# Patient Record
Sex: FEMALE | Race: WHITE | NOT HISPANIC OR LATINO | Employment: OTHER | URBAN - METROPOLITAN AREA
[De-identification: names, ages, dates, MRNs, and addresses within clinical notes are randomized per-mention and may not be internally consistent; named-entity substitution may affect disease eponyms.]

---

## 2017-05-26 ENCOUNTER — RADIANT APPOINTMENT (OUTPATIENT)
Dept: GENERAL RADIOLOGY | Facility: CLINIC | Age: 62
End: 2017-05-26
Attending: PHYSICIAN ASSISTANT
Payer: COMMERCIAL

## 2017-05-26 ENCOUNTER — OFFICE VISIT (OUTPATIENT)
Dept: INTERNAL MEDICINE | Facility: CLINIC | Age: 62
End: 2017-05-26
Payer: COMMERCIAL

## 2017-05-26 VITALS
WEIGHT: 141.5 LBS | DIASTOLIC BLOOD PRESSURE: 74 MMHG | OXYGEN SATURATION: 97 % | SYSTOLIC BLOOD PRESSURE: 130 MMHG | TEMPERATURE: 98.3 F | HEART RATE: 99 BPM | HEIGHT: 68 IN | BODY MASS INDEX: 21.44 KG/M2

## 2017-05-26 DIAGNOSIS — M25.561 ACUTE PAIN OF RIGHT KNEE: ICD-10-CM

## 2017-05-26 DIAGNOSIS — M25.561 ACUTE PAIN OF RIGHT KNEE: Primary | ICD-10-CM

## 2017-05-26 PROCEDURE — 99214 OFFICE O/P EST MOD 30 MIN: CPT | Performed by: PHYSICIAN ASSISTANT

## 2017-05-26 PROCEDURE — 73560 X-RAY EXAM OF KNEE 1 OR 2: CPT | Mod: RT

## 2017-05-26 RX ORDER — METHYLPREDNISOLONE 4 MG
TABLET, DOSE PACK ORAL
Qty: 21 TABLET | Refills: 0 | Status: SHIPPED | OUTPATIENT
Start: 2017-05-26 | End: 2018-01-08

## 2017-05-26 NOTE — PATIENT INSTRUCTIONS
Icing,   No ibuprofen while on the steroid sybil. Ok for tylenol if needed.  Consider physical therapy    If not improving then would refer to ortho sports med.

## 2017-05-26 NOTE — NURSING NOTE
"Chief Complaint   Patient presents with     Musculoskeletal Problem     R knee pain-x2 wks        Initial /74 (BP Location: Left arm, Patient Position: Chair, Cuff Size: Adult Regular)  Pulse 99  Temp 98.3  F (36.8  C) (Oral)  Ht 5' 8\" (1.727 m)  Wt 141 lb 8 oz (64.2 kg)  SpO2 97%  BMI 21.52 kg/m2 Estimated body mass index is 21.52 kg/(m^2) as calculated from the following:    Height as of this encounter: 5' 8\" (1.727 m).    Weight as of this encounter: 141 lb 8 oz (64.2 kg).  Medication Reconciliation: complete    "

## 2017-05-26 NOTE — MR AVS SNAPSHOT
After Visit Summary   5/26/2017    Ireen Haile    MRN: [id number]           Patient Information     Date Of Birth          1955        Visit Information        Provider Department      5/26/2017 10:40 AM Starr Contreras PA-C [facility]        Today's Diagnoses     Acute pain of right knee    -  1      Care Instructions    Icing,   No ibuprofen while on the steroid sybil. Ok for tylenol if needed.  Consider physical therapy    If not improving then would refer to ortho sports med.          Follow-ups after your visit        Additional Services     [facility] PT, HAND, AND CHIROPRACTIC REFERRAL       **This order will print in the [facility] Scheduling Office**    Physical Therapy, Hand Therapy and Chiropractic Care are available through:    *[facility]  *[facility] [place]  *[facility]    Call one number to schedule at any of the above locations: [phone].    Your provider has referred you to: Physical Therapy at [facility] or [facility]    Indication/Reason for Referral: right knee pain   Onset of Illness: 2 weeks   Therapy Orders: Evaluate and Treat  Special Programs:   Special Request:     Didier Velásquez      Additional Comments for the Therapist or Chiropractor:     Please be aware that coverage of these services is subject to the terms and limitations of your health insurance plan.  Call member services at your health plan with any benefit or coverage questions.      Please bring the following to your appointment:    *Your personal calendar for scheduling future appointments  *Comfortable clothing                  Who to contact     If you have questions or need follow up information about today's clinic visit or your schedule please contact [facility] directly at [phone].  Normal or non-critical lab and imaging results will be communicated to you by MyChart, letter or phone within 4 business days  "after the clinic has received the results. If you do not hear from us within 7 days, please contact the clinic through [employer] or phone. If you have a critical or abnormal lab result, we will notify you by phone as soon as possible.  Submit refill requests through [employer] or call your pharmacy and they will forward the refill request to us. Please allow 3 business days for your refill to be completed.          Additional Information About Your Visit        [employer] Information     [employer] gives you secure access to your electronic health record. If you see a primary care provider, you can also send messages to your care team and make appointments. If you have questions, please call your primary care clinic.  If you do not have a primary care provider, please call [phone] and they will assist you.        Care EveryWhere ID     This is your Care EveryWhere ID. This could be used by other organizations to access your [place] medical records  [id number]        Your Vitals Were     Pulse Temperature Height Pulse Oximetry BMI (Body Mass Index)       99 98.3  F (36.8  C) (Oral) 5' 8\" (1.727 m) 97% 21.52 kg/m2        Blood Pressure from Last 3 Encounters:   05/26/17 130/74   09/23/16 122/74   08/12/16 124/64    Weight from Last 3 Encounters:   05/26/17 141 lb 8 oz (64.2 kg)   09/23/16 145 lb (65.8 kg)   08/12/16 147 lb 8 oz (66.9 kg)              We Performed the Following     [facility] PT, HAND, AND CHIROPRACTIC REFERRAL          Today's Medication Changes          These changes are accurate as of: 5/26/17 11:24 AM.  If you have any questions, ask your nurse or doctor.               Start taking these medicines.        Dose/Directions    methylPREDNISolone 4 MG tablet   Commonly known as:  MEDROL DOSEPAK   Used for:  Acute pain of right knee   Started by:  Starr Contreras PA-C        Follow package instructions   Quantity:  21 tablet   Refills:  0            Where to get your medicines      These medications were " sent to [id number] - [place] - [address] & [facility]  [address] [address]     Phone:  [phone]     methylPREDNISolone 4 MG tablet                Primary Care Provider Office Phone # Fax #    Leonardo Angela [phone]       [facility] [address]  [facility] [address]        Thank you!     Thank you for choosing [facility]  for your care. Our goal is always to provide you with excellent care. Hearing back from our patients is one way we can continue to improve our services. Please take a few minutes to complete the written survey that you may receive in the mail after your visit with us. Thank you!             Your Updated Medication List - Protect others around you: Learn how to safely use, store and throw away your medicines at www.disposemymeds.org.          This list is accurate as of: 5/26/17 11:24 AM.  Always use your most recent med list.                   Brand Name Dispense Instructions for use    methylPREDNISolone 4 MG tablet    MEDROL DOSEPAK    21 tablet    Follow package instructions       nicotine 21 MG/24HR 24 hr patch    NICODERM CQ    30 patch    Place 1 patch onto the skin every 24 hours

## 2017-05-26 NOTE — PROGRESS NOTES
"  SUBJECTIVE:                                                    Irene Haile is a 61 year old female who presents to clinic today for the following health issues:      Joint Pain     Onset: x2 wks     Description:   Location: right knee  Character: Sharp and Dull ache    Intensity: moderate    Progression of Symptoms: same    Accompanying Signs & Symptoms:  Other symptoms: swelling   History:   Previous similar pain: no       Precipitating factors:   Trauma or overuse: no     Alleviating factors:  Improved by: ice       Therapies Tried and outcome: ibuprofen and tylenol ? Redness of the knee on and off.         -------------------------------------    Problem list and histories reviewed & adjusted, as indicated.  Additional history: as documented    Labs reviewed in EPIC    Reviewed and updated as needed this visit by clinical staff  Tobacco  Allergies       Reviewed and updated as needed this visit by Provider         ROS:  Constitutional, derm , cardiovascular, pulmonary, gi  MS systems are negative, except as otherwise noted.    OBJECTIVE:                                                    /74 (BP Location: Left arm, Patient Position: Chair, Cuff Size: Adult Regular)  Pulse 99  Temp 98.3  F (36.8  C) (Oral)  Ht 5' 8\" (1.727 m)  Wt 141 lb 8 oz (64.2 kg)  SpO2 97%  BMI 21.52 kg/m2  Body mass index is 21.52 kg/(m^2).  GENERAL: healthy, alert and no distress  RESP: lungs clear to auscultation - no rales, rhonchi or wheezes  CV: regular rate and rhythm, normal S1 S2, no S3 or S4, no murmur, click or rub, no peripheral edema and peripheral pulses strong  MS: KNEE:  Inspection: small effusion  Tender: medial joint line  Non-tender: patella tendon, MCL, LCL, lateral joint line, prepatellar bursa, popliteal region  Active Range of Motion: pain with flexion, full extension  Strength:   Special tests: normal Valgus stress test, normal Varus, negative pivot shift    Also examined: hip full range of " motion  SKIN: no suspicious lesions or rashes    Diagnostic Test Results:  Xray prelim- no acute findings and joint space normal.      ASSESSMENT/PLAN:                                                            1. Acute pain of right knee    - XR Knee Standing Right 2 Views; Future  - ERVIN PT, HAND, AND CHIROPRACTIC REFERRAL  - methylPREDNISolone (MEDROL DOSEPAK) 4 MG tablet; Follow package instructions  Dispense: 21 tablet; Refill: 0    Reviewed treatment, declined referral to ortho sports med at this time.   If not improving though with above treatment plan, recommend to see ortho sports med.     Starr Contreras PA-C  [facility]

## 2017-06-10 ENCOUNTER — HEALTH MAINTENANCE LETTER (OUTPATIENT)
Age: 62
End: 2017-06-10

## 2017-12-30 DIAGNOSIS — Z13.6 CARDIOVASCULAR SCREENING; LDL GOAL LESS THAN 130: Primary | ICD-10-CM

## 2017-12-30 DIAGNOSIS — E03.9 HYPOTHYROIDISM, UNSPECIFIED TYPE: ICD-10-CM

## 2017-12-30 DIAGNOSIS — Z13.1 SCREENING FOR DIABETES MELLITUS: ICD-10-CM

## 2018-01-08 ENCOUNTER — OFFICE VISIT (OUTPATIENT)
Dept: FAMILY MEDICINE | Facility: CLINIC | Age: 63
End: 2018-01-08
Payer: COMMERCIAL

## 2018-01-08 VITALS
SYSTOLIC BLOOD PRESSURE: 118 MMHG | HEART RATE: 99 BPM | TEMPERATURE: 98.7 F | DIASTOLIC BLOOD PRESSURE: 70 MMHG | WEIGHT: 146 LBS | HEIGHT: 68 IN | BODY MASS INDEX: 22.13 KG/M2 | OXYGEN SATURATION: 97 % | RESPIRATION RATE: 20 BRPM

## 2018-01-08 DIAGNOSIS — J01.00 ACUTE NON-RECURRENT MAXILLARY SINUSITIS: Primary | ICD-10-CM

## 2018-01-08 DIAGNOSIS — Z12.31 VISIT FOR SCREENING MAMMOGRAM: ICD-10-CM

## 2018-01-08 PROCEDURE — 99213 OFFICE O/P EST LOW 20 MIN: CPT | Performed by: NURSE PRACTITIONER

## 2018-01-08 RX ORDER — FLUTICASONE PROPIONATE 50 MCG
2 SPRAY, SUSPENSION (ML) NASAL DAILY
Qty: 16 G | Refills: 0 | Status: SHIPPED | OUTPATIENT
Start: 2018-01-08 | End: 2018-03-08

## 2018-01-08 NOTE — PROGRESS NOTES
SUBJECTIVE:   Irene Haile is a 62 year old female who presents to clinic today for the following health issues:      RESPIRATORY SYMPTOMS      Duration: 2 weeks    Description  nasal congestion, rhinorrhea, facial pain/pressure, fever, chills, headache and fatigue/malaise    Severity: moderate    Accompanying signs and symptoms: Green nasal drainage    History (predisposing factors):  COPD    Precipitating or alleviating factors: None    Therapies tried and outcome:  oral decongestant acetaminophen with some help    Pt presents with 2 full weeks of symptoms.  No fevers.  + congestion, sinus pressure, post nasal drip.  Cough is present but does not feel it is of major significance.  She is taking in food and fluids.    Problem list and histories reviewed & adjusted, as indicated.  Additional history: as documented    Patient Active Problem List   Diagnosis     Tobacco use disorder     CARDIOVASCULAR SCREENING; LDL GOAL LESS THAN 130     ACP (advance care planning)     COPD (chronic obstructive pulmonary disease) (H)     Axillary adenopathy     Hypothyroidism, unspecified type     Past Surgical History:   Procedure Laterality Date     C NONSPECIFIC PROCEDURE   2010    Laparoscopic appendectomy     C TOTAL ABDOM HYSTERECTOMY       BSO       Social History   Substance Use Topics     Smoking status: Current Every Day Smoker     Packs/day: 0.50     Years: 20.00     Types: Cigarettes     Smokeless tobacco: Never Used      Comment:       Alcohol use No     Family History   Problem Relation Age of Onset     DIABETES Brother      Diag. age 41     Breast Cancer Mother      CANCER Mother      All over her body     Alcohol/Drug Father      Has Liver Problems     Cancer - colorectal Brother      before age 50     CANCER Brother      liver cancer      Family History Negative Brother      ulcerative colitis     Family History Negative Sister      Sjogren's Daughter              Reviewed and updated as  "[facility]  Meds  Med Hx  Surg Hx  Fam Hx  Soc Hx      Reviewed and updated as needed this visit by Provider         ROS:  SEE HPI.    OBJECTIVE:     /70  Pulse 99  Temp 98.7  F (37.1  C) (Oral)  Resp 20  Ht 5' 8\" (1.727 m)  Wt 146 lb (66.2 kg)  SpO2 97%  BMI 22.2 kg/m2  Body mass index is 22.2 kg/(m^2).  GENERAL: healthy, alert and no distress  EYES: Eyes grossly normal to inspection  HENT: ear canals and TM's normal, nose and mouth without ulcers or lesions.  Post nasal drip visualized.  NECK: no adenopathy, no asymmetry, masses, or scars and thyroid normal to palpation  RESP: lungs clear to auscultation - no rales, rhonchi or wheezes  CV: regular rate and rhythm, normal S1 S2, no S3 or S4, no murmur, click or rub, no peripheral edema and peripheral pulses strong  PSYCH: mentation appears normal, affect normal/bright    Diagnostic Test Results:  none     ASSESSMENT/PLAN:   1. Acute non-recurrent maxillary sinusitis  62 y.o. Female, here today with concerns regarding ongoing sinusitis symptoms x2 weeks.  No improvement.  Hx of COPD but does not seem to be causing symptoms now.  Augmentin, flonase.  Symptomatic care.  Pt agrees with plan and verbalized understanding.  - amoxicillin-clavulanate (AUGMENTIN) 875-125 MG per tablet; Take 1 tablet by mouth 2 times daily  Dispense: 20 tablet; Refill: 0  - fluticasone (FLONASE) 50 MCG/ACT spray; Spray 2 sprays into both nostrils daily  Dispense: 16 g; Refill: 0    2. Visit for screening mammogram  - *MA Screening Digital Bilateral; Future    Vale Vizcaino, ARUN CNP  [facility]  "

## 2018-01-08 NOTE — MR AVS SNAPSHOT
After Visit Summary   1/8/2018    Irene Haile    MRN: [id number]           Patient Information     Date Of Birth          1955        Visit Information        Provider Department      1/8/2018 10:40 AM Vale Vizcaino Ra, APRN CNP [facility]        Today's Diagnoses     Visit for screening mammogram    -  1    Acute non-recurrent maxillary sinusitis          Care Instructions    Please continue to monitor symptoms.  I would recommend increasing fluids, rest, and steamy humidification.  Please return to clinic if you notice any change or increase in symptoms.              Follow-ups after your visit        Future tests that were ordered for you today     Open Future Orders        Priority Expected Expires Ordered    *MA Screening Digital Bilateral Routine  1/8/2019 1/8/2018            Who to contact     If you have questions or need follow up information about today's clinic visit or your schedule please contact [facility] directly at [phone].  Normal or non-critical lab and imaging results will be communicated to you by MyChart, letter or phone within 4 business days after the clinic has received the results. If you do not hear from us within 7 days, please contact the clinic through [employer] or phone. If you have a critical or abnormal lab result, we will notify you by phone as soon as possible.  Submit refill requests through [employer] or call your pharmacy and they will forward the refill request to us. Please allow 3 business days for your refill to be completed.          Additional Information About Your Visit        MyChart Information     [employer] gives you secure access to your electronic health record. If you see a primary care provider, you can also send messages to your care team and make appointments. If you have questions, please call your primary care clinic.  If you do not have a primary care provider, please call [phone] and they will  "assist you.        Care EveryWhere ID     This is your Care EveryWhere ID. This could be used by other organizations to access your [place] medical records  [id number]        Your Vitals Were     Pulse Temperature Respirations Height Pulse Oximetry BMI (Body Mass Index)    99 98.7  F (37.1  C) (Oral) 20 5' 8\" (1.727 m) 97% 22.2 kg/m2       Blood Pressure from Last 3 Encounters:   01/08/18 118/70   05/26/17 130/74   09/23/16 122/74    Weight from Last 3 Encounters:   01/08/18 146 lb (66.2 kg)   05/26/17 141 lb 8 oz (64.2 kg)   09/23/16 145 lb (65.8 kg)                 Today's Medication Changes          These changes are accurate as of: 1/8/18 11:25 AM.  If you have any questions, ask your nurse or doctor.               Start taking these medicines.        Dose/Directions    amoxicillin-clavulanate 875-125 MG per tablet   Commonly known as:  AUGMENTIN   Used for:  Acute non-recurrent maxillary sinusitis   Started by:  Vale Vizcaino Ra, APRN CNP        Dose:  1 tablet   Take 1 tablet by mouth 2 times daily   Quantity:  20 tablet   Refills:  0       fluticasone 50 MCG/ACT spray   Commonly known as:  FLONASE   Used for:  Acute non-recurrent maxillary sinusitis   Started by:  Vale Vizcaino Ra, APRN CNP        Dose:  2 spray   Spray 2 sprays into both nostrils daily   Quantity:  16 g   Refills:  0            Where to get your medicines      These medications were sent to [facility] [address] [place] [address] [facility] AT [place] [address] & [facility]  [address] [facility] [address]     Phone:  [phone]     amoxicillin-clavulanate 875-125 MG per tablet    fluticasone 50 MCG/ACT spray                Primary Care Provider Office Phone # Fax #    Leonardo Angela [phone]       [address] [place] [address]        Equal Access to Services     TREY WYLIE AH: Mitesh Young, galileo collins, qaybta kaalmada adebasia arellano" medardo ruiz ah. So [facility] [phone].    ATENCIÓN: Si habla sharon, tiene a laura disposición servicios gratuitos de asistencia lingüística. Brunilda al [phone].    We comply with applicable federal civil rights laws and [place] laws. We do not discriminate on the basis of race, color, national origin, age, disability, sex, sexual orientation, or gender identity.            Thank you!     Thank you for choosing [facility]  for your care. Our goal is always to provide you with excellent care. Hearing back from our patients is one way we can continue to improve our services. Please take a few minutes to complete the written survey that you may receive in the mail after your visit with us. Thank you!             Your Updated Medication List - Protect others around you: Learn how to safely use, store and throw away your medicines at www.disposemymeds.org.          This list is accurate as of: 1/8/18 11:25 AM.  Always use your most recent med list.                   Brand Name Dispense Instructions for use Diagnosis    amoxicillin-clavulanate 875-125 MG per tablet    AUGMENTIN    20 tablet    Take 1 tablet by mouth 2 times daily    Acute non-recurrent maxillary sinusitis       fluticasone 50 MCG/ACT spray    FLONASE    16 g    Spray 2 sprays into both nostrils daily    Acute non-recurrent maxillary sinusitis

## 2018-01-08 NOTE — NURSING NOTE
"Chief Complaint   Patient presents with     URI       Initial /70  Pulse 99  Temp 98.7  F (37.1  C) (Oral)  Resp 20  Ht 5' 8\" (1.727 m)  Wt 146 lb (66.2 kg)  SpO2 97%  BMI 22.2 kg/m2 Estimated body mass index is 22.2 kg/(m^2) as calculated from the following:    Height as of this encounter: 5' 8\" (1.727 m).    Weight as of this encounter: 146 lb (66.2 kg).  Medication Reconciliation: complete   Lindsey HANNA M.A.      "

## 2018-01-08 NOTE — PATIENT INSTRUCTIONS
Please continue to monitor symptoms.  I would recommend increasing fluids, rest, and steamy humidification.  Please return to clinic if you notice any change or increase in symptoms.

## 2018-01-12 ENCOUNTER — TELEPHONE (OUTPATIENT)
Dept: FAMILY MEDICINE | Facility: CLINIC | Age: 63
End: 2018-01-12

## 2018-01-12 DIAGNOSIS — J01.90 ACUTE SINUSITIS WITH SYMPTOMS > 10 DAYS: Primary | ICD-10-CM

## 2018-01-12 RX ORDER — DOXYCYCLINE 100 MG/1
100 CAPSULE ORAL 2 TIMES DAILY
Qty: 20 CAPSULE | Refills: 0 | Status: SHIPPED | OUTPATIENT
Start: 2018-01-12 | End: 2018-01-22

## 2018-01-12 NOTE — TELEPHONE ENCOUNTER
"Patient c/o diarrhea \"big time\" and terrible stomach pain while taking augmentin. Had been taking probiotic also. Did not take med yesterday or today. Still continues with symptoms, cough a little worse.    Requesting alternative med.    Carolyn Greer RN    "

## 2018-02-02 ENCOUNTER — RADIANT APPOINTMENT (OUTPATIENT)
Dept: MAMMOGRAPHY | Facility: CLINIC | Age: 63
End: 2018-02-02
Attending: NURSE PRACTITIONER
Payer: COMMERCIAL

## 2018-02-02 DIAGNOSIS — Z12.31 VISIT FOR SCREENING MAMMOGRAM: ICD-10-CM

## 2018-02-02 DIAGNOSIS — E03.9 HYPOTHYROIDISM, UNSPECIFIED TYPE: ICD-10-CM

## 2018-02-02 DIAGNOSIS — Z13.1 SCREENING FOR DIABETES MELLITUS: ICD-10-CM

## 2018-02-02 DIAGNOSIS — Z13.6 CARDIOVASCULAR SCREENING; LDL GOAL LESS THAN 130: ICD-10-CM

## 2018-02-02 PROCEDURE — 77067 SCR MAMMO BI INCL CAD: CPT | Mod: TC

## 2018-02-02 PROCEDURE — 80061 LIPID PANEL: CPT | Performed by: INTERNAL MEDICINE

## 2018-02-02 PROCEDURE — 80048 BASIC METABOLIC PNL TOTAL CA: CPT | Performed by: INTERNAL MEDICINE

## 2018-02-02 PROCEDURE — 36415 COLL VENOUS BLD VENIPUNCTURE: CPT | Performed by: INTERNAL MEDICINE

## 2018-02-02 PROCEDURE — 84443 ASSAY THYROID STIM HORMONE: CPT | Performed by: INTERNAL MEDICINE

## 2018-02-02 PROCEDURE — 84439 ASSAY OF FREE THYROXINE: CPT | Performed by: INTERNAL MEDICINE

## 2018-02-03 LAB
ANION GAP SERPL CALCULATED.3IONS-SCNC: 8 MMOL/L (ref 3–14)
BUN SERPL-MCNC: 12 MG/DL (ref 7–30)
CALCIUM SERPL-MCNC: 9.2 MG/DL (ref 8.5–10.1)
CHLORIDE SERPL-SCNC: 106 MMOL/L (ref 94–109)
CHOLEST SERPL-MCNC: 170 MG/DL
CO2 SERPL-SCNC: 25 MMOL/L (ref 20–32)
CREAT SERPL-MCNC: 0.63 MG/DL (ref 0.52–1.04)
GFR SERPL CREATININE-BSD FRML MDRD: >90 ML/MIN/1.7M2
GLUCOSE SERPL-MCNC: 96 MG/DL (ref 70–99)
HDLC SERPL-MCNC: 39 MG/DL
LDLC SERPL CALC-MCNC: 112 MG/DL
NONHDLC SERPL-MCNC: 131 MG/DL
POTASSIUM SERPL-SCNC: 3.7 MMOL/L (ref 3.4–5.3)
SODIUM SERPL-SCNC: 139 MMOL/L (ref 133–144)
T4 FREE SERPL-MCNC: 0.88 NG/DL (ref 0.76–1.46)
TRIGL SERPL-MCNC: 94 MG/DL
TSH SERPL DL<=0.005 MIU/L-ACNC: 9.28 MU/L (ref 0.4–4)

## 2018-02-26 ENCOUNTER — TELEPHONE (OUTPATIENT)
Dept: INTERNAL MEDICINE | Facility: CLINIC | Age: 63
End: 2018-02-26

## 2018-02-26 NOTE — TELEPHONE ENCOUNTER
Patient was sent the following message in my chart a couple weeks ago but has not read the message regarding her lab results per EPIC chart:    [place],   Total Cholesterol, Triglycerides, Electrolyte, Glucose/Sugar and Kidney function lab results were normal.   HDL (good) Cholesterol, LDL (bad) Cholesterol and Non-HDL (bad) cholesterol lab results were abnormal.   Abnormalities of these lab values increase the risk for heart and other vascular disease.   Thyroid lab results were abnormal and shows that your thyroid function/metabolism is too low.  This can contribute to fatigue, weight gain, constipation, cold intolerance, etc.   Please make an appointment to see me in the next few weeks to discuss these results further and treatment options for your thyroid function.     Dr Angela       Please call patient and ask her to schedule a follow-up appointment with me regarding thyroid issues

## 2018-03-08 ENCOUNTER — OFFICE VISIT (OUTPATIENT)
Dept: INTERNAL MEDICINE | Facility: CLINIC | Age: 63
End: 2018-03-08
Payer: COMMERCIAL

## 2018-03-08 VITALS
TEMPERATURE: 97.8 F | WEIGHT: 146 LBS | HEART RATE: 80 BPM | RESPIRATION RATE: 16 BRPM | DIASTOLIC BLOOD PRESSURE: 72 MMHG | SYSTOLIC BLOOD PRESSURE: 116 MMHG | BODY MASS INDEX: 22.2 KG/M2

## 2018-03-08 DIAGNOSIS — Z23 NEED FOR TDAP VACCINATION: ICD-10-CM

## 2018-03-08 DIAGNOSIS — Z13.6 CARDIOVASCULAR SCREENING; LDL GOAL LESS THAN 130: Primary | ICD-10-CM

## 2018-03-08 DIAGNOSIS — E03.9 HYPOTHYROIDISM, UNSPECIFIED TYPE: ICD-10-CM

## 2018-03-08 DIAGNOSIS — F17.200 TOBACCO USE DISORDER: ICD-10-CM

## 2018-03-08 LAB
T3FREE SERPL-MCNC: 2.6 PG/ML (ref 2.3–4.2)
T4 FREE SERPL-MCNC: 0.81 NG/DL (ref 0.76–1.46)
TSH SERPL DL<=0.005 MIU/L-ACNC: 12.27 MU/L (ref 0.4–4)

## 2018-03-08 PROCEDURE — 90715 TDAP VACCINE 7 YRS/> IM: CPT | Performed by: INTERNAL MEDICINE

## 2018-03-08 PROCEDURE — 36415 COLL VENOUS BLD VENIPUNCTURE: CPT | Performed by: INTERNAL MEDICINE

## 2018-03-08 PROCEDURE — 86376 MICROSOMAL ANTIBODY EACH: CPT | Performed by: INTERNAL MEDICINE

## 2018-03-08 PROCEDURE — 84481 FREE ASSAY (FT-3): CPT | Performed by: INTERNAL MEDICINE

## 2018-03-08 PROCEDURE — 99214 OFFICE O/P EST MOD 30 MIN: CPT | Mod: 25 | Performed by: INTERNAL MEDICINE

## 2018-03-08 PROCEDURE — 84439 ASSAY OF FREE THYROXINE: CPT | Performed by: INTERNAL MEDICINE

## 2018-03-08 PROCEDURE — 90471 IMMUNIZATION ADMIN: CPT | Performed by: INTERNAL MEDICINE

## 2018-03-08 PROCEDURE — 84443 ASSAY THYROID STIM HORMONE: CPT | Performed by: INTERNAL MEDICINE

## 2018-03-08 PROCEDURE — 86800 THYROGLOBULIN ANTIBODY: CPT | Performed by: INTERNAL MEDICINE

## 2018-03-08 RX ORDER — LEVOTHYROXINE SODIUM 50 UG/1
50 TABLET ORAL DAILY
Qty: 30 TABLET | Refills: 11 | Status: SHIPPED | OUTPATIENT
Start: 2018-03-08 | End: 2018-04-18 | Stop reason: DRUGHIGH

## 2018-03-08 ASSESSMENT — PAIN SCALES - GENERAL: PAINLEVEL: NO PAIN (0)

## 2018-03-08 NOTE — NURSING NOTE
Screening Questionnaire for Adult Immunization    Are you sick today?   No   Do you have allergies to medications, food, a vaccine component or latex?   No   Have you ever had a serious reaction after receiving a vaccination?   No   Do you have a long-term health problem with heart disease, lung disease, asthma, kidney disease, metabolic disease (e.g. diabetes), anemia, or other blood disorder?   No   Do you have cancer, leukemia, HIV/AIDS, or any other immune system problem?   No   In the past 3 months, have you taken medications that affect  your immune system, such as prednisone, other steroids, or anticancer drugs; drugs for the treatment of rheumatoid arthritis, Crohn s disease, or psoriasis; or have you had radiation treatments?   No   Have you had a seizure, or a brain or other nervous system problem?   No   During the past year, have you received a transfusion of blood or blood     products, or been given immune (gamma) globulin or antiviral drug?   No   For women: Are you pregnant or is there a chance you could become        pregnant during the next month?   No   Have you received any vaccinations in the past 4 weeks?   No     Immunization questionnaire answers were all negative.        Per orders of , injection of Tdap given by Starr Rodriguez. Patient instructed to remain in clinic for 15 minutes afterwards, and to report any adverse reaction to me immediately.       Screening performed by Starr Rodriguez on 3/8/2018 at 3:39 PM.

## 2018-03-08 NOTE — PATIENT INSTRUCTIONS
Labs as ordered   Nicotine gum when want a cigarette  TDAP vaccine  If insurance doesn't cover nicotine gum, contact  [employer]   Start Levothyroxine 50 mcg tablet, 1 tablet daily in the morning for thyroid function  Repeat nonfasting thyroid labs in 6 weeks  Reduce saturated fats (red meats, fried and processed foods) in your diet, increase intake of foods rich in Omega-3 fatty acids (fish, nuts, seeds, etc) and increase the amount of color on your plate with fruits and vegetables.  Repeat fasting cholesterol/lipid lab in 1 year

## 2018-03-08 NOTE — MR AVS SNAPSHOT
After Visit Summary   3/8/2018    Irene Haile    MRN: [id number]           Patient Information     Date Of Birth          1955        Visit Information        Provider Department      3/8/2018 3:30 PM Leonardo Angela MD [facility]        Today's Diagnoses     Need for Tdap vaccination    -  1    Chronic obstructive pulmonary disease, unspecified COPD type (H)        Tobacco use disorder        Hypothyroidism, unspecified type          Care Instructions    Labs as ordered   Nicotine gum when want a cigarette  TDAP vaccine  If insurance doesn't cover nicotine gum, try  MNQUITPLAN          Follow-ups after your visit        Who to contact     If you have questions or need follow up information about today's clinic visit or your schedule please contact [facility] directly at [phone].  Normal or non-critical lab and imaging results will be communicated to you by MyChart, letter or phone within 4 business days after the clinic has received the results. If you do not hear from us within 7 days, please contact the clinic through MyChart or phone. If you have a critical or abnormal lab result, we will notify you by phone as soon as possible.  Submit refill requests through [employer] or call your pharmacy and they will forward the refill request to us. Please allow 3 business days for your refill to be completed.          Additional Information About Your Visit        MyChart Information     [employer] gives you secure access to your electronic health record. If you see a primary care provider, you can also send messages to your care team and make appointments. If you have questions, please call your primary care clinic.  If you do not have a primary care provider, please call [phone] and they will assist you.        Care EveryWhere ID     This is your Care EveryWhere ID. This could be used by other organizations to access your [facility]  records  [id number]        Your Vitals Were     Pulse Temperature Respirations BMI (Body Mass Index)          80 97.8  F (36.6  C) (Oral) 16 22.2 kg/m2         Blood Pressure from Last 3 Encounters:   03/08/18 116/72   01/08/18 118/70   05/26/17 130/74    Weight from Last 3 Encounters:   03/08/18 146 lb (66.2 kg)   01/08/18 146 lb (66.2 kg)   05/26/17 141 lb 8 oz (64.2 kg)              We Performed the Following     Anti thyroglobulin antibody     T3 Free     T4 free     TDAP VACCINE (ADACEL)     Thyroid peroxidase antibody     TSH     VACCINE ADMINISTRATION, INITIAL          Today's Medication Changes          These changes are accurate as of 3/8/18  4:15 PM.  If you have any questions, ask your nurse or doctor.               Start taking these medicines.        Dose/Directions    nicotine polacrilex 2 MG gum   Commonly known as:  EQ NICOTINE POLACRILEX   Used for:  Tobacco use disorder   Started by:  Leonardo Angela MD        Dose:  2 mg   Place 1 each (2 mg) inside cheek as needed for smoking cessation   Quantity:  240 tablet   Refills:  1            Where to get your medicines      These medications were sent to [facility] [address] [place], [place] [address] [facility] AT [facility] [address] [place] [id number] [facility] [address]     Phone:  [phone]     nicotine polacrilex 2 MG gum                Primary Care Provider Office Phone # Fax #    Leonardo Angela [phone]       [address] [place] [address]        Equal Access to Services     [facility]: Hadii aad ku hadasho Soomaali, waaxda luqadaha, qaybta kaalmada adeegyada, waxay idiin haytia ruiz . So [facility] [phone].    ATENCIÓN: Si habla español, tiene a laura disposición servicios gratuitos de asistencia lingüística. Llame al [phone].    We comply with applicable federal civil rights laws and [place] laws. We do not discriminate on the basis of race, color, national origin, age,  disability, sex, sexual orientation, or gender identity.            Thank you!     Thank you for choosing [facility]  for your care. Our goal is always to provide you with excellent care. Hearing back from our patients is one way we can continue to improve our services. Please take a few minutes to complete the written survey that you may receive in the mail after your visit with us. Thank you!             Your Updated Medication List - Protect others around you: Learn how to safely use, store and throw away your medicines at www.disposemymeds.org.          This list is accurate as of 3/8/18  4:15 PM.  Always use your most recent med list.                   Brand Name Dispense Instructions for use Diagnosis    nicotine polacrilex 2 MG gum    EQ NICOTINE POLACRILEX    240 tablet    Place 1 each (2 mg) inside cheek as needed for smoking cessation    Tobacco use disorder

## 2018-03-08 NOTE — LETTER
[facility]  [address] [address]  [phone]        March 13, 2019    Irene Haile  [address] [place] [address]              Dear Irene Haile    This is to remind you that your fasting labs is due.    You may call our office at [phone] to schedule an appointment.    Please disregard this notice if you have already had your labs drawn or made an appointment.        Sincerely,        Leonardo Angela MD

## 2018-03-08 NOTE — PROGRESS NOTES
SUBJECTIVE:   Irene Haile is a 62 year old female who presents to clinic today for the following health issues:    Chief Complaint   Patient presents with     Thyroid Cehck     Lab Results         Hypothyroidism Follow-up      Since last visit, patient describes the following symptoms: Weight stable, no hair loss, no skin changes, no constipation, no loose stools      Amount of exercise or physical activity: 2-3 days/week for an average of 30-45 minutes    Problems taking medications regularly: No    Medication side effects: none    Diet: regular (no restrictions)    Pt's past medical history, family history, habits, medications and allergies were reviewed with the patient today.  See snap shot for  HCM status. Most recent lab results reviewed with pt. Problem list and histories reviewed & adjusted, as indicated.  Additional history as below:     No identified additional risks  The 10-year ASCVD risk score (Stonevilleanival OBRIEN Jr, et al., 2013) is: 6.8%    Values used to calculate the score:      Age: 62 years      Sex: Female      Is Non- : No      Diabetic: No      Tobacco smoker: Yes      Systolic Blood Pressure: 116 mmHg      Is BP treated: No      HDL Cholesterol: 39 mg/dL      Total Cholesterol: 170 mg/dL    Component      Latest Ref Rng & Units 9/23/2016 2/2/2018 3/8/2018   Cholesterol      <200 mg/dL  170    Triglycerides      <150 mg/dL  94    HDL Cholesterol      >49 mg/dL  39 (L)    LDL Cholesterol Calculated      <100 mg/dL  112 (H)    Non HDL Cholesterol      <130 mg/dL  131 (H)    TSH      0.40 - 4.00 mU/L 10.88 (H) 9.28 (H) 12.27 (H)   T4 Free      0.76 - 1.46 ng/dL 0.87 0.88 0.81   Free T3      2.3 - 4.2 pg/mL   2.6     Patient currently smoking half pack of cigarettes per day.  Interested in trying some nicotine gum for cessation  Denies chest pain, shortness of breath, abdominal pain, headache, vision changes or side effects with medications.  Weight is up 5 pounds compared to  "last May 2017.  Mild fatigue and cold intolerance.  No constipation    Additional ROS:   Constitutional, HEENT, Cardiovascular, Pulmonary, GI and , Neuro, MSK and Psych review of systems/symptoms are otherwise negative or unchanged from previous, except as noted above.      OBJECTIVE:  /72  Pulse 80  Temp 97.8  F (36.6  C) (Oral)  Resp 16  Wt 146 lb (66.2 kg)  BMI 22.2 kg/m2   Estimated body mass index is 22.2 kg/(m^2) as calculated from the following:    Height as of 1/8/18: 5' 8\" (1.727 m).    Weight as of this encounter: 146 lb (66.2 kg).  Eye: PERRL, EOMI  HENT: ear canals and TM's normal and nose and mouth without ulcers or lesions   Neck: no adenopathy. Thyroid normal to palpation. No bruits  Pulm: Lungs clear to auscultation   CV: Regular rates and rhythm  GI: Soft, nontender, Normal active bowel sounds, No hepatosplenomegaly or masses palpable  Ext: Peripheral pulses intact. No edema.  Neuro: Normal strength and tone, sensory exam grossly normal    Assessment/Plan: (See plan discussion below for further details)  1. Hypothyroidism, unspecified type  Thyroid function remains too low.  Start levothyroxine.  Additional antibody labs as below . Recheck TSH 6 weeks   - T3 Free  - T4 free  - TSH  - Thyroid peroxidase antibody  - Anti thyroglobulin antibody  - TSH with free T4 reflex; Future  - levothyroxine (SYNTHROID/LEVOTHROID) 50 MCG tablet; Take 1 tablet (50 mcg) by mouth daily  Dispense: 30 tablet; Refill: 11    2. Need for Tdap vaccination  - TDAP VACCINE (ADACEL)  - VACCINE ADMINISTRATION, INITIAL    3. Tobacco use disorder  Counseled regarding smoking cessation.  Willing to consider nicotine gum. Rx done  - nicotine polacrilex (EQ NICOTINE POLACRILEX) 2 MG gum; Place 1 each (2 mg) inside cheek as needed for smoking cessation  Dispense: 240 tablet; Refill: 1    4. CARDIOVASCULAR SCREENING; LDL GOAL LESS THAN 130   CAD risk < 7.5%.  Will continue diet and exercise and repeat lab in 1 year. " Lipids improved  - Lipid panel reflex to direct LDL Fasting; Future    Plan discussion:  Labs as ordered   Nicotine gum when want a cigarette  TDAP vaccine  If insurance doesn't cover nicotine gum, contact  [facility]   Start Levothyroxine 50 mcg tablet, 1 tablet daily in the morning for thyroid function  Repeat nonfasting thyroid labs in 6 weeks  Reduce saturated fats (red meats, fried and processed foods) in your diet, increase intake of foods rich in Omega-3 fatty acids (fish, nuts, seeds, etc) and increase the amount of color on your plate with fruits and vegetables.  Repeat fasting cholesterol/lipid lab in 1 year          Leonardo Angela MD  Internal Medicine Department  [facility]

## 2018-03-09 ENCOUNTER — TELEPHONE (OUTPATIENT)
Dept: INTERNAL MEDICINE | Facility: CLINIC | Age: 63
End: 2018-03-09

## 2018-03-09 LAB
THYROGLOB AB SERPL IA-ACNC: <20 IU/ML (ref 0–40)
THYROPEROXIDASE AB SERPL-ACNC: 373 IU/ML

## 2018-03-09 NOTE — TELEPHONE ENCOUNTER
TSH is elevated, Rx for Levothyroxine has been sent to [facility] on [address]. Patient also needs fasting lab appointment in 6 weeks per Dr. Angela.  Detailed message left with above information, Patient has CTC on file.

## 2018-04-17 DIAGNOSIS — E03.9 HYPOTHYROIDISM, UNSPECIFIED TYPE: ICD-10-CM

## 2018-04-17 PROCEDURE — 84443 ASSAY THYROID STIM HORMONE: CPT | Performed by: INTERNAL MEDICINE

## 2018-04-17 PROCEDURE — 36415 COLL VENOUS BLD VENIPUNCTURE: CPT | Performed by: INTERNAL MEDICINE

## 2018-04-17 PROCEDURE — 84439 ASSAY OF FREE THYROXINE: CPT | Performed by: INTERNAL MEDICINE

## 2018-04-18 ENCOUNTER — MYC MEDICAL ADVICE (OUTPATIENT)
Dept: INTERNAL MEDICINE | Facility: CLINIC | Age: 63
End: 2018-04-18

## 2018-04-18 DIAGNOSIS — E03.9 HYPOTHYROIDISM, UNSPECIFIED TYPE: Primary | ICD-10-CM

## 2018-04-18 LAB
T4 FREE SERPL-MCNC: 0.89 NG/DL (ref 0.76–1.46)
TSH SERPL DL<=0.005 MIU/L-ACNC: 7.12 MU/L (ref 0.4–4)

## 2018-04-18 RX ORDER — LEVOTHYROXINE SODIUM 75 UG/1
75 TABLET ORAL DAILY
Qty: 30 TABLET | Refills: 11 | Status: SHIPPED | OUTPATIENT
Start: 2018-04-18 | End: 2019-05-14

## 2019-04-26 ENCOUNTER — ANCILLARY PROCEDURE (OUTPATIENT)
Dept: MAMMOGRAPHY | Facility: CLINIC | Age: 64
End: 2019-04-26
Payer: COMMERCIAL

## 2019-04-26 DIAGNOSIS — Z13.6 CARDIOVASCULAR SCREENING; LDL GOAL LESS THAN 130: ICD-10-CM

## 2019-04-26 DIAGNOSIS — E03.9 HYPOTHYROIDISM, UNSPECIFIED TYPE: ICD-10-CM

## 2019-04-26 DIAGNOSIS — Z12.31 VISIT FOR SCREENING MAMMOGRAM: ICD-10-CM

## 2019-04-26 LAB
CHOLEST SERPL-MCNC: 191 MG/DL
HDLC SERPL-MCNC: 39 MG/DL
LDLC SERPL CALC-MCNC: 129 MG/DL
NONHDLC SERPL-MCNC: 152 MG/DL
T4 FREE SERPL-MCNC: 1.25 NG/DL (ref 0.76–1.46)
TRIGL SERPL-MCNC: 117 MG/DL
TSH SERPL DL<=0.005 MIU/L-ACNC: 5.03 MU/L (ref 0.4–4)

## 2019-04-26 PROCEDURE — 36415 COLL VENOUS BLD VENIPUNCTURE: CPT | Performed by: INTERNAL MEDICINE

## 2019-04-26 PROCEDURE — 80061 LIPID PANEL: CPT | Performed by: INTERNAL MEDICINE

## 2019-04-26 PROCEDURE — 77067 SCR MAMMO BI INCL CAD: CPT | Mod: TC

## 2019-04-26 PROCEDURE — 84443 ASSAY THYROID STIM HORMONE: CPT | Performed by: INTERNAL MEDICINE

## 2019-04-26 PROCEDURE — 84439 ASSAY OF FREE THYROXINE: CPT | Performed by: INTERNAL MEDICINE

## 2019-05-14 DIAGNOSIS — E03.9 HYPOTHYROIDISM, UNSPECIFIED TYPE: ICD-10-CM

## 2019-05-14 RX ORDER — LEVOTHYROXINE SODIUM 75 UG/1
TABLET ORAL
Qty: 30 TABLET | Refills: 0 | Status: SHIPPED | OUTPATIENT
Start: 2019-05-14 | End: 2019-06-13

## 2019-05-14 NOTE — TELEPHONE ENCOUNTER
"Requested Prescriptions   Pending Prescriptions Disp Refills     levothyroxine (SYNTHROID/LEVOTHROID) 75 MCG tablet [Pharmacy Med Name: LEVOTHYROXINE 0.075MG (75MCG) TABS] 30 tablet 0     Sig: TAKE 1 TABLET BY MOUTH EVERY DAY       Thyroid Protocol Failed - 5/14/2019  3:18 AM        Failed - Recent (12 mo) or future (30 days) visit within the authorizing provider's specialty     Patient had office visit in the last 12 months or has a visit in the next 30 days with authorizing provider or within the authorizing provider's specialty.  See \"Patient Info\" tab in inbasket, or \"Choose Columns\" in Meds & Orders section of the refill encounter.              Failed - Normal TSH on file in past 12 months     Recent Labs   Lab Test 04/26/19  0912   TSH 5.03*              Passed - Patient is 12 years or older        Passed - Medication is active on med list        Passed - No active pregnancy on record     If patient is pregnant or has had a positive pregnancy test, please check TSH.          Passed - No positive pregnancy test in past 12 months     If patient is pregnant or has had a positive pregnancy test, please check TSH.          Last Written Prescription Date:  4/18/18  Last Fill Quantity: 30,  # refills: 11   Last office visit: 3/8/2018 with prescribing provider:  3/8/18   Future Office Visit:      "

## 2019-05-14 NOTE — TELEPHONE ENCOUNTER
Routing refill request to provider for review/approval because:  Labs out of range:  tsh  Patient needs to be seen because it has been more than 1 year since last office visit.

## 2019-06-13 DIAGNOSIS — E03.9 HYPOTHYROIDISM, UNSPECIFIED TYPE: ICD-10-CM

## 2019-06-13 NOTE — TELEPHONE ENCOUNTER
"Requested Prescriptions   Pending Prescriptions Disp Refills     levothyroxine (SYNTHROID/LEVOTHROID) 75 MCG tablet [Pharmacy Med Name: LEVOTHYROXINE 0.075MG (75MCG) TABS]  Last Written Prescription Date:  05/14/2019  Last Fill Quantity: 30,  # refills: 0   Last Office Visit: 3/8/2018   Future Office Visit:      30 tablet 0     Sig: TAKE 1 TABLET BY MOUTH EVERY DAY       Thyroid Protocol Failed - 6/13/2019  3:19 AM        Failed - Recent (12 mo) or future (30 days) visit within the authorizing provider's specialty     Patient had office visit in the last 12 months or has a visit in the next 30 days with authorizing provider or within the authorizing provider's specialty.  See \"Patient Info\" tab in inbasket, or \"Choose Columns\" in Meds & Orders section of the refill encounter.              Failed - Normal TSH on file in past 12 months     Recent Labs   Lab Test 04/26/19  0912   TSH 5.03*              Passed - Patient is 12 years or older        Passed - Medication is active on med list        Passed - No active pregnancy on record     If patient is pregnant or has had a positive pregnancy test, please check TSH.          Passed - No positive pregnancy test in past 12 months     If patient is pregnant or has had a positive pregnancy test, please check TSH.            "

## 2019-06-14 NOTE — TELEPHONE ENCOUNTER
Pt last seen in clinic March 2018. Was instructed 1 month ago that needed f/u appt and none made. Call pt and get appt scheduled in the next month and then route refill request back to me to address

## 2019-06-17 NOTE — TELEPHONE ENCOUNTER
Left detailed message. Consent to Communicate checked. Once pt calls to schedule a f/u with PCP refill will be ok'd. Please route to PCP when appointment is made. Lyric Nava on 6/17/2019 at 8:57 AM

## 2019-06-18 RX ORDER — LEVOTHYROXINE SODIUM 75 UG/1
TABLET ORAL
Qty: 30 TABLET | Refills: 0 | Status: SHIPPED | OUTPATIENT
Start: 2019-06-18 | End: 2019-06-24 | Stop reason: DRUGHIGH

## 2019-06-19 PROBLEM — E78.5 HYPERLIPIDEMIA LDL GOAL <100: Status: ACTIVE | Noted: 2019-06-19

## 2019-06-19 NOTE — TELEPHONE ENCOUNTER
Pt has an appt next week . Explained that if we were to test TSH she would not need to be fasting .Penelope Johnson RN     No

## 2019-06-19 NOTE — TELEPHONE ENCOUNTER
. Last TSH minimally elevated but normal T4. Unclear if has been consistent  With med  Use or not. Therefore will refill for 30 days and will discuss further with pt at appt later this month as scheduled. Inform pt

## 2019-06-24 ENCOUNTER — OFFICE VISIT (OUTPATIENT)
Dept: INTERNAL MEDICINE | Facility: CLINIC | Age: 64
End: 2019-06-24
Payer: COMMERCIAL

## 2019-06-24 VITALS
HEIGHT: 67 IN | WEIGHT: 146 LBS | OXYGEN SATURATION: 99 % | DIASTOLIC BLOOD PRESSURE: 76 MMHG | BODY MASS INDEX: 22.91 KG/M2 | RESPIRATION RATE: 16 BRPM | HEART RATE: 88 BPM | TEMPERATURE: 97.7 F | SYSTOLIC BLOOD PRESSURE: 126 MMHG

## 2019-06-24 DIAGNOSIS — E03.9 HYPOTHYROIDISM, UNSPECIFIED TYPE: Primary | ICD-10-CM

## 2019-06-24 DIAGNOSIS — F17.200 TOBACCO USE DISORDER: ICD-10-CM

## 2019-06-24 PROCEDURE — 99214 OFFICE O/P EST MOD 30 MIN: CPT | Performed by: INTERNAL MEDICINE

## 2019-06-24 RX ORDER — LEVOTHYROXINE SODIUM 88 UG/1
88 TABLET ORAL DAILY
Qty: 30 TABLET | Refills: 11 | Status: SHIPPED | OUTPATIENT
Start: 2019-06-24 | End: 2020-01-26 | Stop reason: DRUGHIGH

## 2019-06-24 ASSESSMENT — MIFFLIN-ST. JEOR: SCORE: 1254.88

## 2019-06-24 NOTE — PROGRESS NOTES
"Subjective     Irene Haile is a 63 year old female who presents to clinic today for the following health issues:    HPI   Hypothyroidism Follow-up      Since last visit, patient describes the following symptoms: Weight stable, no hair loss, no skin changes, no constipation, no loose stools      Amount of exercise or physical activity: 6-7 days/week for an average of greater than 60 minutes    Problems taking medications regularly: No    Medication side effects: none    Diet: regular (no restrictions)      Pt's past medical history, family history, habits, medications and allergies were reviewed with the patient today.  See snap shot for  HCM status. Most recent lab results reviewed with pt. Problem list and histories reviewed & adjusted, as indicated.  Additional history as below:    HAs been consistent with taking thyroid med daily. Energy OK. occ nap. Weght stable.  BMs daily. TSH elevated  Smoking 1/2 ppd.  Has failed nicotine gum and patches. Wants to try Chantix. Denies depression issues  Denies chest pain, shortness of breath, cough        Additional ROS:   Constitutional, HEENT, Cardiovascular, Pulmonary, GI and , Neuro, MSK and Psych review of systems/symptoms are otherwise negative or unchanged from previous, except as noted above.      OBJECTIVE:  /76   Pulse 88   Temp 97.7  F (36.5  C) (Oral)   Resp 16   Ht 1.702 m (5' 7\")   Wt 66.2 kg (146 lb)   SpO2 99%   BMI 22.87 kg/m     Estimated body mass index is 22.87 kg/m  as calculated from the following:    Height as of this encounter: 1.702 m (5' 7\").    Weight as of this encounter: 66.2 kg (146 lb).     HENT: No oral lesions noted  Neck: no adenopathy. Thyroid normal to palpation. No bruits  Pulm: Lungs clear to auscultation   CV: Regular rates and rhythm  GI: Soft, nontender, Normal active bowel sounds, No hepatosplenomegaly or masses palpable  Ext: Peripheral pulses intact. No edema.     Assessment/Plan: (See plan discussion below for " further details)   1. Hypothyroidism, unspecified type   Thyroid function too low and needs higher dose  - levothyroxine (SYNTHROID/LEVOTHROID) 88 MCG tablet; Take 1 tablet (88 mcg) by mouth daily  Dispense: 30 tablet; Refill: 11  - TSH with free T4 reflex; Future    2. Tobacco use disorder   Interested in quitting. No sx now  - varenicline (CHANTIX RICHARD) 0.5 MG X 11 & 1 MG X 42 tablet; Take 0.5 mg tab daily for 3 days, THEN 0.5 mg tab twice daily for 4 days, THEN 1 mg twice daily.  Dispense: 53 tablet; Refill: 0      Plan discussion:   Increase levothyroxine to 88mcg tab, 1 tab daily for thyroid function   Nonfasting TSH lab in mid August at  any [facility] lab  Chantix with dose tapering the first week to standard  twice  A day dose after the first week. Start in 2 weeks and  stop ALL smoking 1 week later  Email me in [place]  Or call nurseline [phone] 3 weeks after starting the Chantix. If doing OK, will then prescribe the standard dose for month 2 and 3         Leonardo Angela MD  Internal Medicine Department  [facility]    (Chart documentation was completed, in part, with [employer] voice-recognition software. Even though reviewed, some grammatical, spelling, and word errors may remain.)

## 2019-06-24 NOTE — PATIENT INSTRUCTIONS
Increase levothyroxine to 88mcg tab, 1 tab daily for thyroid function   Nonfasting TSH lab in mid August at  any [facility] lab  Chantix with dose tapering the first week to standard  twice  A day dose after the first week. Start in 2 weeks and  stop ALL smoking 1 week later  Email me in [place]  Or call nurseline [phone] 3 weeks after starting the Chantix. If doing OK, will then prescribe the standard dose for month 2 and 3

## 2019-08-14 DIAGNOSIS — F17.200 TOBACCO USE DISORDER: Primary | ICD-10-CM

## 2019-08-14 NOTE — TELEPHONE ENCOUNTER
Confirm with pt that she is not currently smoking and then route back to me with update. If off cigs, will address refills. If still smoking, however, then makes no sense to continue Chantix if has continued to smoke and will have to look into alternatives

## 2019-08-16 RX ORDER — VARENICLINE TARTRATE 1 MG/1
1 TABLET, FILM COATED ORAL 2 TIMES DAILY
Qty: 60 TABLET | Refills: 1 | Status: SHIPPED | OUTPATIENT
Start: 2019-08-16 | End: 2020-01-10

## 2019-08-16 NOTE — TELEPHONE ENCOUNTER
Called patient and asked providers question and she stated that she has been out of meds for 1 week now and she has been chewing gum like crazy. She has had about 2 cigarettes but that is it. She would really like to get back on the chantix.

## 2019-09-30 ENCOUNTER — HEALTH MAINTENANCE LETTER (OUTPATIENT)
Age: 64
End: 2019-09-30

## 2020-01-10 ENCOUNTER — ANCILLARY PROCEDURE (OUTPATIENT)
Dept: GENERAL RADIOLOGY | Facility: CLINIC | Age: 65
End: 2020-01-10
Attending: INTERNAL MEDICINE
Payer: COMMERCIAL

## 2020-01-10 ENCOUNTER — OFFICE VISIT (OUTPATIENT)
Dept: INTERNAL MEDICINE | Facility: CLINIC | Age: 65
End: 2020-01-10
Payer: COMMERCIAL

## 2020-01-10 VITALS
OXYGEN SATURATION: 98 % | RESPIRATION RATE: 16 BRPM | WEIGHT: 143 LBS | HEART RATE: 92 BPM | DIASTOLIC BLOOD PRESSURE: 76 MMHG | BODY MASS INDEX: 22.44 KG/M2 | HEIGHT: 67 IN | TEMPERATURE: 98.9 F | SYSTOLIC BLOOD PRESSURE: 130 MMHG

## 2020-01-10 DIAGNOSIS — J06.9 UPPER RESPIRATORY TRACT INFECTION, UNSPECIFIED TYPE: ICD-10-CM

## 2020-01-10 DIAGNOSIS — F17.200 TOBACCO USE DISORDER: ICD-10-CM

## 2020-01-10 DIAGNOSIS — E03.9 HYPOTHYROIDISM, UNSPECIFIED TYPE: ICD-10-CM

## 2020-01-10 DIAGNOSIS — J06.9 UPPER RESPIRATORY TRACT INFECTION, UNSPECIFIED TYPE: Primary | ICD-10-CM

## 2020-01-10 LAB
T4 FREE SERPL-MCNC: 1.12 NG/DL (ref 0.76–1.46)
TSH SERPL DL<=0.005 MIU/L-ACNC: 5.12 MU/L (ref 0.4–4)

## 2020-01-10 PROCEDURE — 36415 COLL VENOUS BLD VENIPUNCTURE: CPT | Performed by: INTERNAL MEDICINE

## 2020-01-10 PROCEDURE — 71046 X-RAY EXAM CHEST 2 VIEWS: CPT

## 2020-01-10 PROCEDURE — 99214 OFFICE O/P EST MOD 30 MIN: CPT | Performed by: INTERNAL MEDICINE

## 2020-01-10 PROCEDURE — 84439 ASSAY OF FREE THYROXINE: CPT | Performed by: INTERNAL MEDICINE

## 2020-01-10 PROCEDURE — 84443 ASSAY THYROID STIM HORMONE: CPT | Performed by: INTERNAL MEDICINE

## 2020-01-10 RX ORDER — VARENICLINE TARTRATE 1 MG/1
1 TABLET, FILM COATED ORAL 2 TIMES DAILY
Qty: 60 TABLET | Refills: 1 | Status: SHIPPED | OUTPATIENT
Start: 2020-01-10 | End: 2021-05-12

## 2020-01-10 RX ORDER — BENZONATATE 200 MG/1
200 CAPSULE ORAL 3 TIMES DAILY PRN
Qty: 30 CAPSULE | Refills: 1 | Status: SHIPPED | OUTPATIENT
Start: 2020-01-10 | End: 2020-02-13

## 2020-01-10 ASSESSMENT — MIFFLIN-ST. JEOR: SCORE: 1236.27

## 2020-01-10 NOTE — PATIENT INSTRUCTIONS
Chest Xray   Labs as ordered   Allegra/fexofenadine 180mg tab, 1 tab daily to help dry up post nasal drainage. May get over the counter   When upper respiratory issues doing better, then start Chantix as directed for smoking cessation and try to stop ALL use of cigarettes 1 week after starting the medication or earlier if able  Advair 1 inhalation twice a day  To help breathing and reduce chest inflammation/cough. Use until cough resolves and breathing easier. Gargle and rinse mouth out with water after use  Benzonatate 1 tab every 8 hrs as needed for cough suppression

## 2020-01-10 NOTE — PROGRESS NOTES
"Subjective     Irene Haile is a 64 year old female who presents to clinic today for the following health issues:    HPI   RESPIRATORY SYMPTOMS      Duration: 1 month    Description  nasal congestion and cough    Severity: moderate    Accompanying signs and symptoms: no appetite, low grade fever     History (predisposing factors):  none    Precipitating or alleviating factors: None    Therapies tried and outcome:  Tylenol, Mucinex- No Relief    Pt's past medical history, family history, habits, medications and allergies were reviewed with the patient today.  See snap shot for  HCM status. Most recent lab results reviewed with pt. Problem list and histories reviewed & adjusted, as indicated.  Additional history as below:    Smoking 1/2 ppd  Hx hypothyroidism. TSH mildly elevated April 2019 when missing doses at  times. Never  had lab f/u as ordered. Compliant with med use now per pt  Treated with  Zpack  On 12/30/19 through  Urgent care. No CXR done. Sx better some re:  Discoloration of nasal discharge that as green and now clear  No F/C now.  No ear  pain. Has stuffiness bilaterally. Mild sore throat.  Mild pressure headache  Cough productive but clear now. Worse at night.  Has a lot of nasal drainage   Still smoking. Down for  4/day     Additional ROS:   Constitutional, HEENT, Cardiovascular, Pulmonary, GI and , Neuro, MSK and Psych review of systems/symptoms are otherwise negative or unchanged from previous, except as noted above.      OBJECTIVE:  /76   Pulse 92   Temp 98.9  F (37.2  C) (Temporal)   Resp 16   Ht 1.702 m (5' 7\")   Wt 64.9 kg (143 lb)   SpO2 98%   BMI 22.40 kg/m     Estimated body mass index is 22.4 kg/m  as calculated from the following:    Height as of this encounter: 1.702 m (5' 7\").    Weight as of this encounter: 64.9 kg (143 lb).     HENT: ear canals and TM's normal and nose and mouth without ulcers or lesions, Nasal mucosa edematous with clear nasal discharge. Sinuses " NT  Neck: no adenopathy. Thyroid normal to palpation. No bruits  Pulm: Lungs clear to auscultation posterior with slight prolonged exp phase. Mld upper ant midline rhonchi that clear with cough. NO accessory muscle use  CV: Regular rates and rhythm  GI: Soft, nontender, Normal active bowel sounds, No hepatosplenomegaly or masses palpable  Ext: Peripheral pulses intact. No edema.       CHEST TWO VIEWS  1/10/2020 9:41 AM      HISTORY: 64-year-old woman with cough for 2 months. History of  smoking.                                                                       IMPRESSION: Since September 23, 2016, heart size is normal. No pleural  effusion, pneumothorax, or abnormal area of consolidation. Irregular  pleural thickening in the right apex is unchanged.     ELIANA ESPARZA MD      Assessment/Plan: (See plan discussion below for further details)  1. Upper respiratory tract infection, unspecified type   Pt s/p Z pack. Improving re: sputum discoloration but still with reap sx. See plan discussion below.   - XR Chest 2 Views; Future  - fluticasone-salmeterol (ADVAIR DISKUS) 250-50 MCG/DOSE inhaler; Inhale 1 puff into the lungs every 12 hours  Dispense: 1 Inhaler; Refill: 0  - benzonatate (TESSALON) 200 MG capsule; Take 1 capsule (200 mg) by mouth 3 times daily as needed for cough  Dispense: 30 capsule; Refill: 1  - T4 free  - T4 free    2. Hypothyroidism, unspecified type   Due for lab f/u.  Previous elevated TSH but had poor compliance with last lab. Taking daily now and energy OK  Prior to recent URI onset  - TSH with free T4 reflex    3. Tobacco use disorder   Motivated to quit smoking.  is ex-smoker. Will restart Chantix  - varenicline (CHANTIX RICHARD) 0.5 MG X 11 & 1 MG X 42 tablet; Take 0.5 mg tab daily for 3 days, THEN 0.5 mg tab twice daily for 4 days, THEN 1 mg twice daily.  Dispense: 53 tablet; Refill: 0  - varenicline (CHANTIX) 1 MG tablet; Take 1 tablet (1 mg) by mouth 2 times daily  Dispense: 60 tablet;  Refill: 1    Plan discussion:   Chest Xray   Labs as ordered   Allegra/fexofenadine 180mg tab, 1 tab daily as needed to help dry up post nasal drainage. May get over the counter   When upper respiratory issues doing better, then start Chantix as directed for smoking cessation and try to stop ALL use of cigarettes 1 week after starting the medication or earlier if able  Advair 1 inhalation twice a day  To help breathing and reduce chest inflammation/cough. Use until cough resolves and breathing easier. Gargle and rinse mouth out with water after use  Benzonatate 1 tab every 8 hrs as needed for cough suppression       Leonardo Angela MD  Internal Medicine Department  [facility]    (Chart documentation was completed, in part, with [employer] voice-recognition software. Even though reviewed, some grammatical, spelling, and word errors may remain.)

## 2020-01-26 DIAGNOSIS — E03.9 HYPOTHYROIDISM, UNSPECIFIED TYPE: Primary | ICD-10-CM

## 2020-01-26 RX ORDER — LEVOTHYROXINE SODIUM 100 UG/1
100 TABLET ORAL DAILY
Qty: 90 TABLET | Refills: 3 | Status: SHIPPED | OUTPATIENT
Start: 2020-01-26 | End: 2020-03-18 | Stop reason: DRUGHIGH

## 2020-02-13 ENCOUNTER — OFFICE VISIT (OUTPATIENT)
Dept: FAMILY MEDICINE | Facility: CLINIC | Age: 65
End: 2020-02-13
Payer: COMMERCIAL

## 2020-02-13 VITALS
SYSTOLIC BLOOD PRESSURE: 118 MMHG | HEART RATE: 108 BPM | HEIGHT: 69 IN | BODY MASS INDEX: 20.97 KG/M2 | DIASTOLIC BLOOD PRESSURE: 58 MMHG | WEIGHT: 141.6 LBS | TEMPERATURE: 98.4 F | RESPIRATION RATE: 18 BRPM | OXYGEN SATURATION: 94 %

## 2020-02-13 DIAGNOSIS — J44.1 COPD EXACERBATION (H): Primary | ICD-10-CM

## 2020-02-13 PROCEDURE — 99213 OFFICE O/P EST LOW 20 MIN: CPT | Performed by: PHYSICIAN ASSISTANT

## 2020-02-13 RX ORDER — PREDNISONE 20 MG/1
TABLET ORAL
Qty: 20 TABLET | Refills: 0 | Status: SHIPPED | OUTPATIENT
Start: 2020-02-13 | End: 2020-03-18

## 2020-02-13 RX ORDER — ALBUTEROL SULFATE 90 UG/1
1-2 AEROSOL, METERED RESPIRATORY (INHALATION) EVERY 4 HOURS PRN
Qty: 1 INHALER | Refills: 0 | Status: SHIPPED | OUTPATIENT
Start: 2020-02-13 | End: 2021-05-12

## 2020-02-13 RX ORDER — HYDROCODONE BITARTRATE AND ACETAMINOPHEN 5; 325 MG/1; MG/1
1 TABLET ORAL
Qty: 7 TABLET | Refills: 0 | Status: SHIPPED | OUTPATIENT
Start: 2020-02-13 | End: 2020-06-22

## 2020-02-13 RX ORDER — AZITHROMYCIN 250 MG/1
TABLET, FILM COATED ORAL
Qty: 6 TABLET | Refills: 0 | Status: SHIPPED | OUTPATIENT
Start: 2020-02-13 | End: 2020-03-18

## 2020-02-13 RX ORDER — BENZONATATE 200 MG/1
200 CAPSULE ORAL 3 TIMES DAILY PRN
Qty: 30 CAPSULE | Refills: 1 | Status: SHIPPED | OUTPATIENT
Start: 2020-02-13 | End: 2020-03-18

## 2020-02-13 ASSESSMENT — MIFFLIN-ST. JEOR: SCORE: 1253.73

## 2020-02-13 NOTE — PROGRESS NOTES
Subjective     Irene Haile is a 64 year old female who presents to clinic today for the following health issues:    URI        Acute Illness   Acute illness concerns: cough, cold  Onset: 2 days, but states she has been sick for several months.  Treated a month ago and symptoms seemed to improve with meds, then returned.     Fever: YES--subjective    Chills/Sweats: YES    Headache (location?): YES    Sinus Pressure: No    Conjunctivitis:  No    Ear Pain: YES: bilateral    Rhinorrhea: YES    Congestion: YES    Sore Throat: no      Cough: YES-productive of clear sputum, productive of yellow sputum    Wheeze: no     Decreased Appetite: YES    Nausea: no     Vomiting: no     Diarrhea:  YES a few times    Dysuria/Freq.: no     Fatigue/Achiness: YES    Sick/Strep Exposure:No     Therapies Tried and outcome: Meds from family doctor, using advair as needed         Patient Active Problem List   Diagnosis     Tobacco use disorder     ACP (advance care planning)     COPD (chronic obstructive pulmonary disease) (H)     Hypothyroidism, unspecified type     Hyperlipidemia LDL goal <100     Past Surgical History:   Procedure Laterality Date     C NONSPECIFIC PROCEDURE   2010    Laparoscopic appendectomy     C TOTAL ABDOM HYSTERECTOMY       BSO       Social History     Tobacco Use     Smoking status: Current Every Day Smoker     Packs/day: 0.50     Years: 20.00     Pack years: 10.00     Types: Cigarettes     Smokeless tobacco: Never Used     Tobacco comment:     Substance Use Topics     Alcohol use: No     Family History   Problem Relation Age of Onset     Diabetes Brother         Diag. age 41     Breast Cancer Mother      Cancer Mother         All over her body     Alcohol/Drug Father         Has Liver Problems     Cancer - colorectal Brother         before age 50     Cancer Brother         liver cancer      Family History Negative Brother         ulcerative colitis     Family History Negative Sister       "Sjogren's Daughter            Reviewed and updated as needed this visit by Provider         Review of Systems   ROS COMP: Constitutional, HEENT, cardiovascular, pulmonary, gi and gu systems are negative, except as otherwise noted.      Objective    /58 (BP Location: Right arm, Patient Position: Chair, Cuff Size: Adult Regular)   Pulse 108   Temp 98.4  F (36.9  C) (Oral)   Resp 18   Ht 1.74 m (5' 8.5\")   Wt 64.2 kg (141 lb 9.6 oz)   SpO2 97%   BMI 21.22 kg/m    There is no height or weight on file to calculate BMI.  Physical Exam   GENERAL APPEARANCE: healthy, alert and no distress  HENT: ear canals and TM's normal, rhinorrhea clear, oral mucous membranes moist and oropharynx clear  RESP: expiratory wheezes throughout and prolonged expiratory phase  CV: regular rates and rhythm, normal S1 S2, no S3 or S4 and no murmur, click or rub            Assessment & Plan     1. COPD exacerbation (H)  Few days of hydrocodone for bedtime for cough, pt not sleeping well due to cough.  States codeine causes insomnia.   Continue advair  Close follow-up with PCP for further management in future.   - azithromycin (ZITHROMAX) 250 MG tablet; Two tablets first day, then one tablet daily for four days.  Dispense: 6 tablet; Refill: 0  - albuterol (PROAIR HFA/PROVENTIL HFA/VENTOLIN HFA) 108 (90 Base) MCG/ACT inhaler; Inhale 1-2 puffs into the lungs every 4 hours as needed  Dispense: 1 Inhaler; Refill: 0  - predniSONE (DELTASONE) 20 MG tablet; Take 3 tabs by mouth daily x 3 days, then 2 tabs daily x 3 days, then 1 tab daily x 3 days, then 1/2 tab daily x 3 days.  Dispense: 20 tablet; Refill: 0  - HYDROcodone-acetaminophen (NORCO) 5-325 MG tablet; Take 1 tablet by mouth nightly as needed (cough)  Dispense: 7 tablet; Refill: 0     Tobacco Cessation:   reports that she has been smoking cigarettes. She has a 10.00 pack-year smoking history. She has never used smokeless tobacco.              No follow-ups on file.    Marissa HOWARD" ANEL Altamirano  [facility]

## 2020-02-14 ENCOUNTER — TELEPHONE (OUTPATIENT)
Dept: INTERNAL MEDICINE | Facility: CLINIC | Age: 65
End: 2020-02-14

## 2020-02-14 NOTE — TELEPHONE ENCOUNTER
Reviewed note in chart from [facility] clinic. Given recurrent respiratory issues with breathing, prednisone appears  appropriate. Best thing pt can do is to quit ALL smoking. Was given Chantix when I last saw her 1 month ago but recent  Note from -AV says pt still smoking

## 2020-02-14 NOTE — TELEPHONE ENCOUNTER
Patient was seen by another provider 2/13/2020 and was prescribed   predniSONE (DELTASONE) 20 MG tablet 20 tablet     She is not sure if she should be taking this medication. She would like to speak to a nurse.

## 2020-02-14 NOTE — TELEPHONE ENCOUNTER
Irene Orantes would like to have your opinion on the prednisone that was Rx'd yesterday at [facility]. Her symptoms/wheezing were the same as when she saw you on 1/10/20 and at that time you had decided to not Rx prednisone.  Could you let her know if you would like her to take the prednisone?  She is leaning towards not taking it as the inhalers are working well.     See other meds that were also Rx'd yesterday.    She is not going to take the norco that was Rx'd as she is sleeping well.    Zully Denson RN- Triage [employer]

## 2020-03-15 ENCOUNTER — HEALTH MAINTENANCE LETTER (OUTPATIENT)
Age: 65
End: 2020-03-15

## 2020-03-17 DIAGNOSIS — E03.9 HYPOTHYROIDISM, UNSPECIFIED TYPE: ICD-10-CM

## 2020-03-17 LAB
T4 FREE SERPL-MCNC: 1.22 NG/DL (ref 0.76–1.46)
TSH SERPL DL<=0.005 MIU/L-ACNC: 4.77 MU/L (ref 0.4–4)

## 2020-03-17 PROCEDURE — 36415 COLL VENOUS BLD VENIPUNCTURE: CPT | Performed by: INTERNAL MEDICINE

## 2020-03-17 PROCEDURE — 84443 ASSAY THYROID STIM HORMONE: CPT | Performed by: INTERNAL MEDICINE

## 2020-03-17 PROCEDURE — 84439 ASSAY OF FREE THYROXINE: CPT | Performed by: INTERNAL MEDICINE

## 2020-03-18 DIAGNOSIS — E03.9 HYPOTHYROIDISM, UNSPECIFIED TYPE: Primary | ICD-10-CM

## 2020-03-18 RX ORDER — LEVOTHYROXINE SODIUM 112 UG/1
112 TABLET ORAL DAILY
Qty: 90 TABLET | Refills: 3 | Status: SHIPPED | OUTPATIENT
Start: 2020-03-18 | End: 2020-05-20 | Stop reason: DRUGHIGH

## 2020-03-20 ENCOUNTER — MYC MEDICAL ADVICE (OUTPATIENT)
Dept: INTERNAL MEDICINE | Facility: CLINIC | Age: 65
End: 2020-03-20

## 2020-03-20 NOTE — TELEPHONE ENCOUNTER
Called and spoke to patient.    Informed patient we levothyroxine dosage was increased from 100mcg to 112mcg- because her thyroid lab results were abnormal and showed thyroid function is too low w/ the current dose.    Advised patient to stop taking the current dose and  the new dose of 112mcg tablet- take 1 tablet daily, order includes 3 refills.    Advised patient to call back if she experiences any concerning s/e.    Patient voiced understanding.    LUCA Washington-BSN-PHN  [facility]  [phone]

## 2020-03-20 NOTE — TELEPHONE ENCOUNTER
PCP please see MyChart response regarding Thyroid lab results and questions about increase dose.    Kristyn ALEXANDREN, RN, PHN

## 2020-05-11 DIAGNOSIS — Z13.1 SCREENING FOR DIABETES MELLITUS: ICD-10-CM

## 2020-05-11 DIAGNOSIS — E03.9 HYPOTHYROIDISM, UNSPECIFIED TYPE: ICD-10-CM

## 2020-05-11 DIAGNOSIS — Z13.6 CARDIOVASCULAR SCREENING; LDL GOAL LESS THAN 100: Primary | ICD-10-CM

## 2020-05-11 NOTE — TELEPHONE ENCOUNTER
Routing refill request to provider for review/approval because:  Labs not current:  Normal TSH on file in past 12 months

## 2020-05-11 NOTE — TELEPHONE ENCOUNTER
Pt overdue for f/u   Thyroid and other labs with last dose increase of Levothyroxine in March. Schedule pt for a fasting  lab test with curide lab this week. Will address RF request after labs back as appropriate. Inform pt and assist with scheduling lab. Then route RF request back to me to hold on to until labs back

## 2020-05-12 RX ORDER — LEVOTHYROXINE SODIUM 100 UG/1
TABLET ORAL
Qty: 90 TABLET | Refills: 3 | OUTPATIENT
Start: 2020-05-12

## 2020-05-12 NOTE — TELEPHONE ENCOUNTER
Left detailed message for pt to call back to make a fasting lab only appt either inside lab or curbside lab and then once results are back the medication will be filled with appropriate dosage.

## 2020-05-12 NOTE — TELEPHONE ENCOUNTER
Will await call back from pt. Also, RF request for the 100mcg tab with this encounter rejected since pt is now supposed to be taking 112mcg tabs prescribed in March. Also call pt and confirm that pt is taking 112mcg tabs now for Levothyroxine

## 2020-05-14 DIAGNOSIS — E03.9 HYPOTHYROIDISM, UNSPECIFIED TYPE: ICD-10-CM

## 2020-05-14 DIAGNOSIS — Z13.1 SCREENING FOR DIABETES MELLITUS: ICD-10-CM

## 2020-05-14 DIAGNOSIS — Z13.6 CARDIOVASCULAR SCREENING; LDL GOAL LESS THAN 100: ICD-10-CM

## 2020-05-14 LAB
ALBUMIN SERPL-MCNC: 3.4 G/DL (ref 3.4–5)
ALP SERPL-CCNC: 112 U/L (ref 40–150)
ALT SERPL W P-5'-P-CCNC: 35 U/L (ref 0–50)
ANION GAP SERPL CALCULATED.3IONS-SCNC: 6 MMOL/L (ref 3–14)
AST SERPL W P-5'-P-CCNC: 31 U/L (ref 0–45)
BILIRUB SERPL-MCNC: 0.5 MG/DL (ref 0.2–1.3)
BUN SERPL-MCNC: 12 MG/DL (ref 7–30)
CALCIUM SERPL-MCNC: 9.4 MG/DL (ref 8.5–10.1)
CHLORIDE SERPL-SCNC: 103 MMOL/L (ref 94–109)
CHOLEST SERPL-MCNC: 189 MG/DL
CO2 SERPL-SCNC: 27 MMOL/L (ref 20–32)
CREAT SERPL-MCNC: 0.62 MG/DL (ref 0.52–1.04)
GFR SERPL CREATININE-BSD FRML MDRD: >90 ML/MIN/{1.73_M2}
GLUCOSE SERPL-MCNC: 99 MG/DL (ref 70–99)
HDLC SERPL-MCNC: 39 MG/DL
LDLC SERPL CALC-MCNC: 130 MG/DL
NONHDLC SERPL-MCNC: 150 MG/DL
POTASSIUM SERPL-SCNC: 4.3 MMOL/L (ref 3.4–5.3)
PROT SERPL-MCNC: 7.5 G/DL (ref 6.8–8.8)
SODIUM SERPL-SCNC: 136 MMOL/L (ref 133–144)
TRIGL SERPL-MCNC: 102 MG/DL
TSH SERPL DL<=0.005 MIU/L-ACNC: 3.96 MU/L (ref 0.4–4)

## 2020-05-14 PROCEDURE — 36415 COLL VENOUS BLD VENIPUNCTURE: CPT | Performed by: INTERNAL MEDICINE

## 2020-05-14 PROCEDURE — 84443 ASSAY THYROID STIM HORMONE: CPT | Performed by: INTERNAL MEDICINE

## 2020-05-14 PROCEDURE — 80061 LIPID PANEL: CPT | Performed by: INTERNAL MEDICINE

## 2020-05-14 PROCEDURE — 80053 COMPREHEN METABOLIC PANEL: CPT | Performed by: INTERNAL MEDICINE

## 2020-05-14 NOTE — TELEPHONE ENCOUNTER
Pt was taking the 112 mg but, only for 3 days since, she was feeling some throat tightening so, she stopped and just continued to take the 100 mg's. Pt did have blood work done this morning. Pt only has two pills left.

## 2020-05-19 ENCOUNTER — MYC MEDICAL ADVICE (OUTPATIENT)
Dept: INTERNAL MEDICINE | Facility: CLINIC | Age: 65
End: 2020-05-19

## 2020-05-19 DIAGNOSIS — E03.9 HYPOTHYROIDISM, UNSPECIFIED TYPE: Primary | ICD-10-CM

## 2020-05-19 NOTE — TELEPHONE ENCOUNTER
Dr. Angela,     See TE from 5/11--  Lyric spoke to pt on 5/14, and according to pt:        See recent TSH result:      What levothyroxine dose do you rec?

## 2020-05-20 ENCOUNTER — MYC MEDICAL ADVICE (OUTPATIENT)
Dept: INTERNAL MEDICINE | Facility: CLINIC | Age: 65
End: 2020-05-20

## 2020-05-20 DIAGNOSIS — E78.5 HYPERLIPIDEMIA LDL GOAL <100: Primary | ICD-10-CM

## 2020-05-20 RX ORDER — LEVOTHYROXINE SODIUM 100 UG/1
100 TABLET ORAL DAILY
Qty: 90 TABLET | Refills: 0 | Status: SHIPPED | OUTPATIENT
Start: 2020-05-20 | End: 2020-08-01

## 2020-06-22 ENCOUNTER — OFFICE VISIT (OUTPATIENT)
Dept: URGENT CARE | Facility: URGENT CARE | Age: 65
End: 2020-06-22
Payer: COMMERCIAL

## 2020-06-22 VITALS
OXYGEN SATURATION: 97 % | TEMPERATURE: 98.3 F | HEART RATE: 95 BPM | WEIGHT: 149 LBS | BODY MASS INDEX: 22.33 KG/M2 | SYSTOLIC BLOOD PRESSURE: 145 MMHG | DIASTOLIC BLOOD PRESSURE: 76 MMHG
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
## 2020-06-22 PROCEDURE — 99213 OFFICE O/P EST LOW 20 MIN: CPT | Performed by: PHYSICIAN ASSISTANT

## 2020-06-22 RX ORDER — PREDNISONE 20 MG/1
40 TABLET ORAL ONCE
Qty: 2 TABLET | Refills: 0 | Status: SHIPPED | OUTPATIENT
Start: 2020-06-22 | End: 2020-06-22

## 2020-06-22 RX ORDER — SULFAMETHOXAZOLE/TRIMETHOPRIM 800-160 MG
1 TABLET ORAL 2 TIMES DAILY
Qty: 14 TABLET | Refills: 0 | Status: SHIPPED | OUTPATIENT
Start: 2020-06-22 | End: 2020-06-29

## 2020-06-22 NOTE — PATIENT INSTRUCTIONS
Patient Education     Infected Insect Bite or Sting    When an insect stings you, it injects venom. When an insect bites you, it does not. Stings and bites may cause a local reaction. Or they may cause a reaction that affects your whole body. Bites and stings may become infected. Signs of infection include redness, warmth, pain, drainage of pus, and swelling. Infections will need treatment with antibiotics and should get better over the next 10 days. However, they can sometimes form an abscess (a pocket of pus) that needs to be opened by a healthcare provider to release the pus.   Home care  The following will help you care for your bite or sting at home:    If a stinger is still in your skin, it will need to be removed. Don't use tweezers. Gently scrape the stinger from the side with a firm object such as the side of a credit card. This will loosen it and remove it from your skin.    If itching is a problem, applying ice packs to the sting area will help.    Wash the area with soap and water at least 3 times a day. Apply a topical antibiotic cream or ointment.    You can use an over-the counter antihistamine unless your doctor has given you a prescription antihistamine. You may use antihistamines to reduce itching if large areas of the skin are involved. Use lower doses during the daytime and higher doses at bedtime since the drug may make you sleepy. Don't use an antihistamine if you have glaucoma or if you are a man with trouble urinating due to an enlarged prostate. Some antihistamines cause less drowsiness and are a good choice for daytime use.    If oral antibiotics have been prescribed, be sure to take them as directed until they are all finished.    You may use over-the-counter pain medicine to control pain, unless another pain medicine was prescribed. Talk with your doctor before using acetaminophen or ibuprofen if you have chronic liver or kidney disease. Also talk with your doctor if you have ever had a  "stomach ulcer or gastrointestinal bleeding.  Follow-up care  Follow up with your healthcare provider, or as advised if you don't get better over the next 2 days or if your symptoms get worse.  Call 911  Call 911 if any of these occur:    Swelling of the face, eyelids, mouth, throat, or tongue    Difficulty swallowing or breathing  When to seek medical advice  Call your healthcare provider right away if any of these occur:    Spreading areas of redness or swelling    Fever of 100.4 F (38 C) or higher, or as directed by your healthcare provider    Increased local pain, especially if the area starts feeling \"squishy\" like a water ballon.    Headache, fever, chills, muscle or joint aching, or vomiting,    New rash  Date Last Reviewed: 10/1/2016    2866-9326 [employer]. [address] [place] [address], [place] [address]. All rights reserved. This information is not intended as a substitute for professional medical care. Always follow your healthcare professional's instructions.           "

## 2020-06-22 NOTE — PROGRESS NOTES
CHIEF COMPLAINT:   Chief Complaint   Patient presents with     Insect Bites     4-5 days ago       HPI: Irene Haile is a 64 year old female who presents to clinic today for evaluation of insect bite.  Patient was bit by an insect 4 to 5 days ago.  Initially, the bite was itchy.  For the past 2 to 3 days she has had increased swelling, warmth and redness.  The area is tender to the touch.  She denies having drainage from the area.  She denies having fever or chills.  She has used over-the-counter itch cream.    Past Medical History:   Diagnosis Date     Axillary adenopathy 6/5/2013     COPD (chronic obstructive pulmonary disease) (H) 6/5/2013     Hypothyroidism, unspecified type 12/30/2017     Personal history of tobacco use, presenting hazards to health      Pulmonary hemorrhage 6/5/2013     UTI (urinary tract infection)      Past Surgical History:   Procedure Laterality Date     C NONSPECIFIC PROCEDURE   June 2010    Laparoscopic appendectomy     C TOTAL ABDOM HYSTERECTOMY  2002     BSO     Social History     Tobacco Use     Smoking status: Current Every Day Smoker     Packs/day: 0.50     Years: 20.00     Pack years: 10.00     Types: Cigarettes     Smokeless tobacco: Never Used     Tobacco comment:     Substance Use Topics     Alcohol use: No     Current Outpatient Medications   Medication     albuterol (PROAIR HFA/PROVENTIL HFA/VENTOLIN HFA) 108 (90 Base) MCG/ACT inhaler     fluticasone-salmeterol (ADVAIR DISKUS) 250-50 MCG/DOSE inhaler     levothyroxine (SYNTHROID/LEVOTHROID) 100 MCG tablet     varenicline (CHANTIX RICHARD) 0.5 MG X 11 & 1 MG X 42 tablet     varenicline (CHANTIX) 1 MG tablet     No current facility-administered medications for this visit.      Allergies   Allergen Reactions     Augmented Betamethasone Diprop [Betamethasone] GI Disturbance     Codeine      Insomnia       Nitrofurantoin      Nausea, loss appetite       10 point ROS of systems including Constitutional, Eyes, Respiratory,  Cardiovascular, Gastroenterology, Genitourinary, Integumentary, Muscularskeletal, Psychiatric were all negative except for pertinent positives noted in my HPI.        Exam:  BP (!) 145/76 (BP Location: Right arm, Cuff Size: Adult Regular)   Pulse 95   Temp 98.3  F (36.8  C) (Tympanic)   Wt 67.6 kg (149 lb)   SpO2 97%   BMI 22.33 kg/m    Constitutional: healthy, alert and no distress  Head: Normocephalic, atraumatic.  Eyes: conjunctiva clear, no drainage  ENT: MMM  Skin: Lower leg has insect bite with swelling, induration, warmth and TTP  Neurologic: Speech clear, gait normal. Moves all extremities.      ASSESSMENT/PLAN:  1. Bug bite with infection, initial encounter  Lower leg has insect bite with evidence now of infection. Timing is concerning for infection (vs. Localized reaction to bug bite)   Treatment as below  Advised warm compresses  OK to take Tylenol/ IBU for pain  - sulfamethoxazole-trimethoprim (BACTRIM DS) 800-160 MG tablet; Take 1 tablet by mouth 2 times daily for 7 days  Dispense: 14 tablet; Refill: 0  - predniSONE (DELTASONE) 20 MG tablet; Take 2 tablets (40 mg) by mouth once for 1 dose  Dispense: 2 tablet; Refill: 0      Diagnosis and treatment plan was reviewed with patient and/or family.   We went over any labs or imaging. Discussed worsening symptoms or little to no relief despite treatment plan to follow-up with PCP or return to clinic.  Patient verbalizes understanding. All questions were addressed and answered.   Anjelica Orellana PA-C

## 2020-07-01 ENCOUNTER — OFFICE VISIT (OUTPATIENT)
Dept: URGENT CARE | Facility: URGENT CARE | Age: 65
End: 2020-07-01
Payer: COMMERCIAL

## 2020-07-01 VITALS
HEART RATE: 89 BPM | WEIGHT: 149.9 LBS | DIASTOLIC BLOOD PRESSURE: 68 MMHG | OXYGEN SATURATION: 97 % | BODY MASS INDEX: 22.46 KG/M2 | SYSTOLIC BLOOD PRESSURE: 131 MMHG | TEMPERATURE: 98.5 F

## 2020-07-01 DIAGNOSIS — L03.115 CELLULITIS OF RIGHT LOWER EXTREMITY: Primary | ICD-10-CM

## 2020-07-01 PROCEDURE — 99213 OFFICE O/P EST LOW 20 MIN: CPT | Performed by: NURSE PRACTITIONER

## 2020-07-01 RX ORDER — TRIAMCINOLONE ACETONIDE 5 MG/G
CREAM TOPICAL 2 TIMES DAILY
Qty: 15 G | Refills: 0 | Status: SHIPPED | OUTPATIENT
Start: 2020-07-01 | End: 2020-07-11

## 2020-07-01 RX ORDER — DOXYCYCLINE 100 MG/1
100 CAPSULE ORAL 2 TIMES DAILY
Qty: 20 CAPSULE | Refills: 0 | Status: SHIPPED | OUTPATIENT
Start: 2020-07-01 | End: 2020-07-11

## 2020-07-01 ASSESSMENT — ENCOUNTER SYMPTOMS
APPETITE CHANGE: 0
WEAKNESS: 0
COUGH: 0
CHILLS: 0
HEADACHES: 0
ACTIVITY CHANGE: 0
PARESTHESIAS: 0
ADENOPATHY: 0
PALPITATIONS: 0
SLEEP DISTURBANCE: 0
CHEST TIGHTNESS: 0
SHORTNESS OF BREATH: 0
FEVER: 0
ABDOMINAL PAIN: 0
DIAPHORESIS: 0
JOINT SWELLING: 0
COLOR CHANGE: 1
NUMBNESS: 0
FATIGUE: 0
MYALGIAS: 0
DIARRHEA: 0
WHEEZING: 0
VOMITING: 0
ARTHRALGIAS: 0
NAUSEA: 0

## 2020-07-01 NOTE — PROGRESS NOTES
Chief Complaint   Patient presents with     Urgent Care     Insect Bites     itchy, swollen insect bite  on r ankle x 12-14 days ago- was seen  and was given  bactrim ds, prednisone  -  is concerned aboutginfection or cellulitis     SUBJECTIVE:  Irene Haile is a 64 year old female who presents to the clinic today for a right calf infection. She was in the garden 2 weeks ago and felt a sting like bite happen. Immediately after she had about 5 days of itching and burning. The site progressed with more redness, swelling, tenderness. She completed a 7 day course of Bactrim with some relief. However, she still has 3 hard red swollen lumps on the calf. She is worried the infection is not fully healed.  Associated symptoms include: nothing.  Symptoms are moderate and rash seems to be improving.  Previous history of a similar rash? No  Treatment measures tried include: Calamine and bactrim  Recent exposure history: none known  Patient denies new meds, pets, foods, soaps, detergents, lotions, or enviornmental contacts.  Relevant history: no dvt or clot history, no recent surgery, dehydration, extended travel, shortness of breath. She is a smoker with varicose veins.    Past Medical History:   Diagnosis Date     Axillary adenopathy 2013     COPD (chronic obstructive pulmonary disease) (H) 2013     Hypothyroidism, unspecified type 2017     Personal history of tobacco use, presenting hazards to health      Pulmonary hemorrhage 2013     UTI (urinary tract infection)      levothyroxine (SYNTHROID/LEVOTHROID) 100 MCG tablet, Take 1 tablet (100 mcg) by mouth daily  albuterol (PROAIR HFA/PROVENTIL HFA/VENTOLIN HFA) 108 (90 Base) MCG/ACT inhaler, Inhale 1-2 puffs into the lungs every 4 hours as needed (Patient not taking: Reported on 2020)  fluticasone-salmeterol (ADVAIR DISKUS) 250-50 MCG/DOSE inhaler, Inhale 1 puff into the lungs every 12 hours (Patient not taking: Reported on 2020)  []  predniSONE (DELTASONE) 20 MG tablet, Take 2 tablets (40 mg) by mouth once for 1 dose  [] sulfamethoxazole-trimethoprim (BACTRIM DS) 800-160 MG tablet, Take 1 tablet by mouth 2 times daily for 7 days  varenicline (CHANTIX RICHARD) 0.5 MG X 11 & 1 MG X 42 tablet, Take 0.5 mg tab daily for 3 days, THEN 0.5 mg tab twice daily for 4 days, THEN 1 mg twice daily. (Patient not taking: Reported on 2020)  varenicline (CHANTIX) 1 MG tablet, Take 1 tablet (1 mg) by mouth 2 times daily (Patient not taking: Reported on 2020)    No current facility-administered medications on file prior to visit.     Social History     Tobacco Use     Smoking status: Current Every Day Smoker     Packs/day: 0.50     Years: 20.00     Pack years: 10.00     Types: Cigarettes     Smokeless tobacco: Never Used     Tobacco comment:     Substance Use Topics     Alcohol use: No     Allergies   Allergen Reactions     Augmented Betamethasone Diprop [Betamethasone] GI Disturbance     Codeine      Insomnia       Nitrofurantoin      Nausea, loss appetite     Review of Systems   Constitutional: Negative for activity change, appetite change, chills, diaphoresis, fatigue and fever.   Respiratory: Negative for cough, chest tightness, shortness of breath and wheezing.    Cardiovascular: Negative for chest pain, palpitations and peripheral edema.   Gastrointestinal: Negative for abdominal pain, diarrhea, nausea and vomiting.   Musculoskeletal: Negative for arthralgias, gait problem, joint swelling and myalgias.   Skin: Positive for color change and rash.   Neurological: Negative for weakness, numbness, headaches and paresthesias.   Hematological: Negative for adenopathy.   Psychiatric/Behavioral: Negative for sleep disturbance.     EXAM:   /68   Pulse 89   Temp 98.5  F (36.9  C) (Tympanic)   Wt 68 kg (149 lb 14.4 oz)   SpO2 97%   BMI 22.46 kg/m      Physical Exam  Vitals signs reviewed.   Constitutional:       Appearance: Normal appearance.    HENT:      Head: Normocephalic and atraumatic.   Cardiovascular:      Rate and Rhythm: Normal rate.   Pulmonary:      Effort: Pulmonary effort is normal.   Musculoskeletal: Normal range of motion.         General: Tenderness present.      Right lower leg: Edema present.      Left lower leg: Edema present.   Skin:     General: Skin is warm and dry.      Findings: Erythema and rash present.      Comments: 3 quarter sized hardened erythematous welts on posterior right calf. Bilateral lower extremities with varicose veins and slight pitting edema.   Neurological:      General: No focal deficit present.      Mental Status: She is alert and oriented to person, place, and time.   Psychiatric:         Mood and Affect: Mood normal.         Behavior: Behavior normal.       ASSESSMENT:    ICD-10-CM    1. Cellulitis of right lower extremity  L03.115 doxycycline hyclate (VIBRAMYCIN) 100 MG capsule     triamcinolone (ARISTOCORT HP) 0.5 % external cream     PLAN:  Patient Instructions   Doxy and triamcinolone for secondary bacterial infection following bug bite  Not able to I&D in clinic today, no fluctuance  Epsom salt baths, cool compress  Add in zyrtec, allegra or claritin in the morning, benadryl at night  Discussed wells score for dvt, very low risk, but emergency if redness swelling spreads up calf OR if shortness of breath, cough  Urgent care does not have capability for stat d dimer or ultrasound and patient declines proceeding with this workup at this time    ED if high fever, nausea, vomiting, joint pains    Follow up with primary care provider with any problems, questions or concerns or if symptoms worsen or fail to improve. Patient agreed to plan and verbalized understanding.    Naida Valdovinos, CANELO-BC  [facility]

## 2020-07-01 NOTE — PATIENT INSTRUCTIONS
Doxy and triamcinolone for secondary bacterial infection following bug bite  Not able to I&D in clinic today, no fluctuance  Epsom salt baths, cool compress  Add in zyrtec, allegra or claritin in the morning, benadryl at night  Discussed wells score for dvt, very low risk, but emergency if redness swelling spreads up calf OR if shortness of breath, cough  Urgent care does not have capability for stat d dimer or ultrasound and patient declines proceeding with this workup at this time    ED if high fever, nausea, vomiting, joint pains

## 2020-07-27 DIAGNOSIS — E03.9 HYPOTHYROIDISM, UNSPECIFIED TYPE: ICD-10-CM

## 2020-07-27 LAB — TSH SERPL DL<=0.005 MIU/L-ACNC: 2.42 MU/L (ref 0.4–4)

## 2020-07-27 PROCEDURE — 84443 ASSAY THYROID STIM HORMONE: CPT | Performed by: INTERNAL MEDICINE

## 2020-07-27 PROCEDURE — 36415 COLL VENOUS BLD VENIPUNCTURE: CPT | Performed by: INTERNAL MEDICINE

## 2020-07-30 ENCOUNTER — MYC REFILL (OUTPATIENT)
Dept: INTERNAL MEDICINE | Facility: CLINIC | Age: 65
End: 2020-07-30

## 2020-07-30 DIAGNOSIS — F17.200 TOBACCO USE DISORDER: ICD-10-CM

## 2020-08-01 DIAGNOSIS — E03.9 HYPOTHYROIDISM, UNSPECIFIED TYPE: ICD-10-CM

## 2020-08-01 RX ORDER — LEVOTHYROXINE SODIUM 100 UG/1
100 TABLET ORAL DAILY
Qty: 90 TABLET | Refills: 1 | Status: SHIPPED | OUTPATIENT
Start: 2020-08-01 | End: 2020-08-18

## 2020-08-18 DIAGNOSIS — E03.9 HYPOTHYROIDISM, UNSPECIFIED TYPE: ICD-10-CM

## 2020-08-18 RX ORDER — LEVOTHYROXINE SODIUM 100 UG/1
TABLET ORAL
Qty: 90 TABLET | Refills: 1 | Status: SHIPPED | OUTPATIENT
Start: 2020-08-18 | End: 2021-05-18

## 2020-08-21 ENCOUNTER — ANCILLARY PROCEDURE (OUTPATIENT)
Dept: MAMMOGRAPHY | Facility: CLINIC | Age: 65
End: 2020-08-21
Payer: COMMERCIAL

## 2020-08-21 DIAGNOSIS — Z12.31 VISIT FOR SCREENING MAMMOGRAM: ICD-10-CM

## 2020-08-21 PROCEDURE — 77067 SCR MAMMO BI INCL CAD: CPT | Mod: TC

## 2021-01-15 ENCOUNTER — HEALTH MAINTENANCE LETTER (OUTPATIENT)
Age: 66
End: 2021-01-15

## 2021-05-12 DIAGNOSIS — E03.9 HYPOTHYROIDISM, UNSPECIFIED TYPE: ICD-10-CM

## 2021-05-12 NOTE — TELEPHONE ENCOUNTER
Routing refill request to provider for review/approval because:  Patient needs to be seen because it has been more than 1 year since last office visit.    Cassy MONTOYA RN, BSN, PHN

## 2021-05-13 NOTE — TELEPHONE ENCOUNTER
Pt last seen in clinic > 1 year ago. Also overdue for fasting labs. Call pt and get fasting lab appt scheduled in the next 6 weeks followed by appt with me in clinic a few days later. After appts scheduled, then route RF request back to me to address

## 2021-05-18 NOTE — TELEPHONE ENCOUNTER
Please see my note below. Why was this routed back  to me before patient appt scheduled as requested?

## 2021-05-19 RX ORDER — LEVOTHYROXINE SODIUM 100 UG/1
TABLET ORAL
Qty: 30 TABLET | Refills: 0 | Status: SHIPPED | OUTPATIENT
Start: 2021-05-19 | End: 2021-06-08 | Stop reason: DRUGHIGH

## 2021-05-19 NOTE — TELEPHONE ENCOUNTER
Medication has been refilled for 30 tabs by nurse protocol.  Future lab appointment and clinic visit with me noted.  Fasting lab orders are in the chart.  Will await results and appointment with me as scheduled

## 2021-05-19 NOTE — TELEPHONE ENCOUNTER
FYI-Pt is scheduled and will come in for appt . Pt is out of medication. Took a 88mcg last night .TOld pt we do not want her to miss doses or if will mess with labs Penelope Johnson RN

## 2021-06-01 ENCOUNTER — TRANSFERRED RECORDS (OUTPATIENT)
Dept: HEALTH INFORMATION MANAGEMENT | Facility: CLINIC | Age: 66
End: 2021-06-01

## 2021-06-04 DIAGNOSIS — E78.5 HYPERLIPIDEMIA LDL GOAL <100: ICD-10-CM

## 2021-06-04 DIAGNOSIS — E03.9 HYPOTHYROIDISM, UNSPECIFIED TYPE: ICD-10-CM

## 2021-06-04 PROCEDURE — 36415 COLL VENOUS BLD VENIPUNCTURE: CPT | Performed by: INTERNAL MEDICINE

## 2021-06-04 PROCEDURE — 80061 LIPID PANEL: CPT | Performed by: INTERNAL MEDICINE

## 2021-06-04 PROCEDURE — 84443 ASSAY THYROID STIM HORMONE: CPT | Performed by: INTERNAL MEDICINE

## 2021-06-05 LAB
CHOLEST SERPL-MCNC: 172 MG/DL
HDLC SERPL-MCNC: 37 MG/DL
LDLC SERPL CALC-MCNC: 117 MG/DL
NONHDLC SERPL-MCNC: 135 MG/DL
TRIGL SERPL-MCNC: 89 MG/DL
TSH SERPL DL<=0.005 MIU/L-ACNC: 3.48 MU/L (ref 0.4–4)

## 2021-06-08 ENCOUNTER — OFFICE VISIT (OUTPATIENT)
Dept: INTERNAL MEDICINE | Facility: CLINIC | Age: 66
End: 2021-06-08
Payer: COMMERCIAL

## 2021-06-08 VITALS
SYSTOLIC BLOOD PRESSURE: 128 MMHG | BODY MASS INDEX: 22.73 KG/M2 | HEART RATE: 92 BPM | WEIGHT: 150 LBS | RESPIRATION RATE: 16 BRPM | DIASTOLIC BLOOD PRESSURE: 70 MMHG | OXYGEN SATURATION: 95 % | HEIGHT: 68 IN | TEMPERATURE: 97.6 F

## 2021-06-08 DIAGNOSIS — F17.200 TOBACCO USE DISORDER: ICD-10-CM

## 2021-06-08 DIAGNOSIS — E03.9 HYPOTHYROIDISM, UNSPECIFIED TYPE: ICD-10-CM

## 2021-06-08 DIAGNOSIS — E78.5 HYPERLIPIDEMIA LDL GOAL <100: ICD-10-CM

## 2021-06-08 DIAGNOSIS — J44.9 CHRONIC OBSTRUCTIVE PULMONARY DISEASE, UNSPECIFIED COPD TYPE (H): ICD-10-CM

## 2021-06-08 PROCEDURE — 99214 OFFICE O/P EST MOD 30 MIN: CPT | Performed by: INTERNAL MEDICINE

## 2021-06-08 RX ORDER — LEVOTHYROXINE SODIUM 112 UG/1
112 TABLET ORAL DAILY
Qty: 30 TABLET | Refills: 11 | Status: SHIPPED | OUTPATIENT
Start: 2021-06-08 | End: 2021-06-24 | Stop reason: DRUGHIGH

## 2021-06-08 RX ORDER — FLUTICASONE PROPIONATE AND SALMETEROL XINAFOATE 115; 21 UG/1; UG/1
2 AEROSOL, METERED RESPIRATORY (INHALATION) 2 TIMES DAILY
Qty: 12 G | Refills: 11 | Status: SHIPPED | OUTPATIENT
Start: 2021-06-08 | End: 2021-06-24

## 2021-06-08 ASSESSMENT — ANXIETY QUESTIONNAIRES
6. BECOMING EASILY ANNOYED OR IRRITABLE: NOT AT ALL
1. FEELING NERVOUS, ANXIOUS, OR ON EDGE: SEVERAL DAYS
3. WORRYING TOO MUCH ABOUT DIFFERENT THINGS: NOT AT ALL
5. BEING SO RESTLESS THAT IT IS HARD TO SIT STILL: NOT AT ALL
7. FEELING AFRAID AS IF SOMETHING AWFUL MIGHT HAPPEN: NOT AT ALL
2. NOT BEING ABLE TO STOP OR CONTROL WORRYING: NOT AT ALL
IF YOU CHECKED OFF ANY PROBLEMS ON THIS QUESTIONNAIRE, HOW DIFFICULT HAVE THESE PROBLEMS MADE IT FOR YOU TO DO YOUR WORK, TAKE CARE OF THINGS AT HOME, OR GET ALONG WITH OTHER PEOPLE: NOT DIFFICULT AT ALL
GAD7 TOTAL SCORE: 1

## 2021-06-08 ASSESSMENT — MIFFLIN-ST. JEOR: SCORE: 1278.9

## 2021-06-08 ASSESSMENT — PATIENT HEALTH QUESTIONNAIRE - PHQ9: 5. POOR APPETITE OR OVEREATING: NOT AT ALL

## 2021-06-08 NOTE — PATIENT INSTRUCTIONS
Stop Levothyroxine 100mcg tab   Start Levothyroxine 112mcg tab, 1 tab daily in AM for thyroid function   Advair 1-2 inhalations twice a day for shortness of breath. Rinse mouth after use  Call  [phone] or use [employer] to schedule a future lab appointment  non-fasting in 6 weeks for thyroid.   I encourage you to stop all smoking.  If unable to quit on your own, you may either try over the counter nicotine patches/gum, contact clinic if interested in prescription therapy (Nicotine supplement/Chantix/Buproprion) or contact the [place] Quit Plan (1-774.519.6244 or www.quitplan.[employer])  Vaccinations: Pneumococcal 23 vaccine. Get after turning 65) at clinic or pharmacy  Check with insurance or speak with your pharmacist re: Shingrix vaccine coverage for shingles prevention.  This is a 2 shot series done 2-6 months apart. Talk with pharmacy downstairs today  Also schedule future lab appointment for repeat fasting cholesterol panel in 6 months.  If 10-year CAD risk remains above goal at that time, will then start statin therapy

## 2021-06-08 NOTE — PROGRESS NOTES
ASSESSMENT:   1. Hypothyroidism, unspecified type  Patient with mild fatigue.  Thyroid function is still within normal.  Will increase levothyroxine to 112 mcg daily and recheck TSH 6 weeks  - levothyroxine (SYNTHROID/LEVOTHROID) 112 MCG tablet; Take 1 tablet (112 mcg) by mouth daily  Dispense: 30 tablet; Refill: 11  - TSH with free T4 reflex; Future    2. Chronic obstructive pulmonary disease, unspecified COPD type (H)  Controlled.  Continue inhaler therapy  - fluticasone-salmeterol (ADVAIR-HFA) 115-21 MCG/ACT inhaler; Inhale 2 puffs into the lungs 2 times daily  Dispense: 12 g; Refill: 11    3. Tobacco use disorder  Counseled regarding smoking cessation.  See plan discussion below.  Consider CT lung screening    4. Hyperlipidemia LDL goal <100  Lipids improved but CAD risk remains elevated.  We will recheck the panel in 6 months with higher thyroid function and with patient hopefully off of cigarettes.  10-year CAD risk meds elevated at that time, will then start statin therapy  - Lipid panel reflex to direct LDL Fasting; Future      PLAN:   Stop Levothyroxine 100mcg tab   Start Levothyroxine 112mcg tab, 1 tab daily in AM for thyroid function   Advair 1-2 inhalations twice a day for shortness of breath. Rinse mouth after use  Call  [phone] or use [employer] to schedule a future lab appointment  non-fasting in 6 weeks for thyroid.   I encourage you to stop all smoking.  If unable to quit on your own, you may either try over the counter nicotine patches/gum, contact clinic if interested in prescription therapy (Nicotine supplement/Chantix/Buproprion) or contact the [place] Quit Plan (1-576.875.6273 or www.quitplan.com)  Vaccinations: Pneumococcal 23 vaccine. Get after turning 65) at clinic or pharmacy  Check with insurance or speak with your pharmacist re: Shingrix vaccine coverage for shingles prevention.  This is a 2 shot series done 2-6 months apart. Talk with pharmacy downstairs today  Also repeat fasting  cholesterol panel in 6 months.  If 10-year CAD risk remains above goal at that time, will then start statin therapy        (Chart documentation was completed, in part, with [employer] voice-recognition software. Even though reviewed, some grammatical, spelling, and word errors may remain.)    Leonardo Angela MD  Internal Medicine Department  [facility] JASON Norris is a 65 year old who presents for the following health issues     HPI     Hypothyroidism Follow-up      Since last visit, patient describes the following symptoms: dry skin and fatigue       Most recent lab results reviewed with pt.        Component      Latest Ref Rng & Units 5/14/2020 7/27/2020 6/4/2021   Sodium      133 - 144 mmol/L 136     Potassium      3.4 - 5.3 mmol/L 4.3     Chloride      94 - 109 mmol/L 103     Carbon Dioxide      20 - 32 mmol/L 27     Anion Gap      3 - 14 mmol/L 6     Glucose      70 - 99 mg/dL 99     Urea Nitrogen      7 - 30 mg/dL 12     Creatinine      0.52 - 1.04 mg/dL 0.62     GFR Estimate      >60 mL/min/1.73:m2 >90     GFR Estimate If Black      >60 mL/min/1.73:m2 >90     Calcium      8.5 - 10.1 mg/dL 9.4     Bilirubin Total      0.2 - 1.3 mg/dL 0.5     Albumin      3.4 - 5.0 g/dL 3.4     Protein Total      6.8 - 8.8 g/dL 7.5     Alkaline Phosphatase      40 - 150 U/L 112     ALT      0 - 50 U/L 35     AST      0 - 45 U/L 31     Cholesterol      <200 mg/dL 189  172   Triglycerides      <150 mg/dL 102  89   HDL Cholesterol      >49 mg/dL 39 (L)  37 (L)   LDL Cholesterol Calculated      <100 mg/dL 130 (H)  117 (H)   Non HDL Cholesterol      <130 mg/dL 150 (H)  135 (H)   TSH      0.40 - 4.00 mU/L  2.42 3.48     No identified additional risks  The 10-year ASCVD risk score (Gifford MICAH Jr., et al., 2013) is: 10.3%    Values used to calculate the score:      Age: 65 years      Sex: Female      Is Non- : No      Diabetic: No      Tobacco smoker: Yes      Systolic Blood  "Pressure: 128 mmHg      Is BP treated: No      HDL Cholesterol: 37 mg/dL      Total Cholesterol: 172 mg/dL       Patient smoking one half to three-quarter pack of cigarettes per day.  Only slight willingness to quit smoking at this time  History of COPD.  Denies shortness of breath issues with current inhaler therapy.  No chest pain, cough.  Recently had a colonoscopy a week ago.  Patient states polyps were found with the largest being 10 mm and patient is to repeat colonoscopy again in 3 years.  Denies current blood in the stools.  No abdominal pain  Thyroid function as above.  Patient has mild fatigue in the day.  Lipids elevated as above.  Lipids improved from previous.  Elevated 10-year CAD risk.  Patient somewhat hesitant to start statin therapy    Additional ROS:   Constitutional, HEENT, Cardiovascular, Pulmonary, GI and , Neuro, MSK and Psych review of systems/symptoms are otherwise negative or unchanged from previous, except as noted above.      OBJECTIVE:  /70   Pulse 92   Temp 97.6  F (36.4  C) (Temporal)   Resp 16   Ht 1.727 m (5' 8\")   Wt 68 kg (150 lb)   SpO2 95%   BMI 22.81 kg/m     Estimated body mass index is 22.81 kg/m  as calculated from the following:    Height as of this encounter: 1.727 m (5' 8\").    Weight as of this encounter: 68 kg (150 lb).     HENT: ear canals and TM's normal and nose and mouth without ulcers or lesions   Neck: no adenopathy. Thyroid normal to palpation. No bruits  Pulm: Lungs clear to auscultation.  No current wheezes or rhonchi  CV: Regular rates and rhythm  GI: Soft, nontender, Normal active bowel sounds, No hepatosplenomegaly or masses palpable  Ext: Peripheral pulses intact. No edema.  Neuro: Normal strength and tone, sensory exam grossly normal       "

## 2021-06-09 ASSESSMENT — ANXIETY QUESTIONNAIRES: GAD7 TOTAL SCORE: 1

## 2021-06-20 ENCOUNTER — MYC MEDICAL ADVICE (OUTPATIENT)
Dept: INTERNAL MEDICINE | Facility: CLINIC | Age: 66
End: 2021-06-20

## 2021-06-24 ENCOUNTER — VIRTUAL VISIT (OUTPATIENT)
Dept: INTERNAL MEDICINE | Facility: CLINIC | Age: 66
End: 2021-06-24
Payer: COMMERCIAL

## 2021-06-24 DIAGNOSIS — E03.9 HYPOTHYROIDISM, UNSPECIFIED TYPE: Primary | ICD-10-CM

## 2021-06-24 DIAGNOSIS — F17.200 TOBACCO USE DISORDER: ICD-10-CM

## 2021-06-24 DIAGNOSIS — M19.90 ARTHRITIS: ICD-10-CM

## 2021-06-24 DIAGNOSIS — J44.9 CHRONIC OBSTRUCTIVE PULMONARY DISEASE, UNSPECIFIED COPD TYPE (H): ICD-10-CM

## 2021-06-24 PROCEDURE — 99214 OFFICE O/P EST MOD 30 MIN: CPT | Mod: 95 | Performed by: INTERNAL MEDICINE

## 2021-06-24 RX ORDER — LEVOTHYROXINE SODIUM 100 UG/1
100 TABLET ORAL DAILY
Qty: 30 TABLET | Refills: 1 | Status: SHIPPED | OUTPATIENT
Start: 2021-06-24 | End: 2021-08-26

## 2021-06-24 RX ORDER — FLUTICASONE PROPIONATE AND SALMETEROL XINAFOATE 115; 21 UG/1; UG/1
2 AEROSOL, METERED RESPIRATORY (INHALATION) 2 TIMES DAILY
Qty: 12 G | Refills: 11 | Status: SHIPPED | OUTPATIENT
Start: 2021-06-24 | End: 2021-09-27

## 2021-06-24 NOTE — PROGRESS NOTES
Myrna is a 65 year old who is being evaluated via a billable video visit.      How would you like to obtain your AVS? [employer]  If the video visit is dropped, the invitation should be resent by: Text to cell phone: [phone]  Will anyone else be joining your video visit? No           ASSESSMENT:   1. Hypothyroidism, unspecified type  Thyroid function was normal previously with a 100 mcg tablet but because of fatigue, dose was increased slightly.  Unclear if patient's joint issues were related to the previous shingles vaccine (Shingrix) versus medication change.  We will therefore go back to 100 mcg daily for now and see how patient's joint symptoms do possible dose increase trial again in the future  - levothyroxine (SYNTHROID/LEVOTHROID) 100 MCG tablet; Take 1 tablet (100 mcg) by mouth daily  Dispense: 30 tablet; Refill: 1    2. Chronic obstructive pulmonary disease, unspecified COPD type (H)  Stable.  Continue Advair  - fluticasone-salmeterol (ADVAIR-HFA) 115-21 MCG/ACT inhaler; Inhale 2 puffs into the lungs 2 times daily  Dispense: 12 g; Refill: 11    3. Arthritis  Possible reaction to Shingrix vaccination versus rheumatologic issue.  Patient to have nonfasting labs drawn in 1 week as below.  May use some ibuprofen twice daily as needed with food for the next 3 days to help quiet joint symptoms on further  - Erythrocyte sedimentation rate auto; Future  - CRP inflammation; Future  - Uric acid; Future  - Rheumatoid factor; Future  - Anti Nuclear Jeannette IgG by IFA with Reflex; Future    4. Tobacco use disorder  Counseled regarding smoking cessation.  Little interest at this time      PLAN:  Remain off of levothyroxine 112 mcg tablet for now and continue levothyroxine 100 mcg tablet 1 tablet daily for thyroid function  Use ibuprofen 2 tablets twice a day with food for up to the next 3 days as needed for joint pain.  Then go back to using Tylenol as needed for pain  Call  [phone] or use [employer] to schedule a  future lab appointment  non-fasting in 1 week regarding joint pain issues  If feeling well in 1 month, then stop levothyroxine 100 mcg tablets and go back to taking a 112 mcg tablet once a day and update me in mychart 1 week later.  If having recurrent joint pains, will have to discontinue higher dosages of levothyroxine.  If tolerating the 112 mcg tablet at that time however (implying your joint symptoms were more likely related to shingles vaccination), but I will have you check a follow-up thyroid function lab nonfasting 6 weeks after that medication dose change  Advair prescription refilled  Continue trying to reduce cigarette use as able        Video-Visit Details    Type of service:  Video Visit    Video Start Time: 9:00am    Video End Time:9:15am    Originating Location (pt. Location): Home    Distant Location (provider location):  [facility]     Platform used for Video Visit: Rosario Angela MD  Internal Medicine Department  [facility]  Internal Medicine Department      (Chart documentation was completed, in part, with [employer] voice-recognition software. Even though reviewed, some grammatical, spelling, and word errors may remain.)         Gayatri Norris is a 65 year old who presents for the following health issues     HPI     Hypothyroidism Follow-up      Since last visit, patient describes the following symptoms: Weight stable, no hair loss, no skin changes, no constipation, no loose stools      How many servings of fruits and vegetables do you eat daily?  0-1    On average, how many sweetened beverages do you drink each day (Examples: soda, juice, sweet tea, etc.  Do NOT count diet or artificially sweetened beverages)?   0    How many days per week do you exercise enough to make your heart beat faster? 3 or less    How many minutes a day do you exercise enough to make your heart beat faster? 9 or less    How many days per week do you  miss taking your medication? 0    Had lower back ache and arthritis symptoms - think it might have been from shingrix vaccine    Patient received a Shingrix vaccination on 6/8/2021.  Was taking levothyroxine 100 mcg tablets at that time.  She then increase levothyroxine dose on 6/10/2020 1 to 112 mcg.  Patient was feeling okay until 6/17/2021 when she began to develop pain in her back and generalized joints.  Joints did not appear red or hot but are generally painful making it sometimes even hard for her to get up out of a chair.  Patient went back to taking levothyroxine 100 mcg tablets on 6/21/2021.  Since that time, joints have been slowly getting better.  Patient has been taking Tylenol at night only.  Levothyroxine 100 mcg tablets are yellow and the 112 mcg tablet is pink in color.  Patient continues to smoke some.  Low motivation to quit.  Breathing stable with use of Advair and needs refill.  Patient denies trauma to the joints.  No exertional chest pain or abdominal pain  Previous TSH in the higher end of normal with the 100 mcg dose and patient had complained of some mild fatigue.  That is why dose increase was done to 112 mcg tablets      Additional ROS:   Constitutional, HEENT, Cardiovascular, Pulmonary, GI and , Neuro, MSK and Psych review of systems/symptoms are otherwise negative or unchanged from previous, except as noted above.           Objective :  No vitals obtained today    Physical Exam:  GENERAL:  , alert and no distress  EYES: Eyes grossly normal to inspection, conjunctivae and sclerae normal  RESP: no audible wheeze, cough, or visible cyanosis.  No visible retractions or increased work of breathing.  Able to speak fully in complete sentences.  NEURO: Cranial nerves grossly intact, mentation intact and speech normal  PSYCH: mentation appears normal, affect normal/bright, judgement and insight intact, normal speech and appearance well-groomed  EXT: Hand joints do not appear reddened or  acutely swollen

## 2021-06-24 NOTE — PATIENT INSTRUCTIONS
Remain off of levothyroxine 112 mcg tablet for now and continue levothyroxine 100 mcg tablet 1 tablet daily for thyroid function  Use ibuprofen 2 tablets twice a day with food for up to the next 3 days as needed for joint pain.  Then go back to using Tylenol as needed for pain  Call  [phone] or use [employer] to schedule a future lab appointment  non-fasting in 1 week regarding joint pain issues  If feeling well in 1 month, then stop levothyroxine 100 mcg tablets and go back to taking a 112 mcg tablet once a day and update me in Link To Mediat 1 week later.  If having recurrent joint pains, will have to discontinue higher dosages of levothyroxine.  If tolerating the 112 mcg tablet at that time however (implying your joint symptoms were more likely related to shingles vaccination), but I will have you check a follow-up thyroid function lab nonfasting 6 weeks after that medication dose change  Advair prescription refilled  Continue trying to reduce cigarette use as able

## 2021-07-13 ENCOUNTER — LAB (OUTPATIENT)
Dept: LAB | Facility: CLINIC | Age: 66
End: 2021-07-13
Payer: COMMERCIAL

## 2021-07-13 DIAGNOSIS — E03.9 HYPOTHYROIDISM, UNSPECIFIED TYPE: ICD-10-CM

## 2021-07-13 DIAGNOSIS — M19.90 ARTHRITIS: ICD-10-CM

## 2021-07-13 LAB — ERYTHROCYTE [SEDIMENTATION RATE] IN BLOOD BY WESTERGREN METHOD: 40 MM/HR (ref 0–30)

## 2021-07-13 PROCEDURE — 84443 ASSAY THYROID STIM HORMONE: CPT

## 2021-07-13 PROCEDURE — 86038 ANTINUCLEAR ANTIBODIES: CPT

## 2021-07-13 PROCEDURE — 84550 ASSAY OF BLOOD/URIC ACID: CPT

## 2021-07-13 PROCEDURE — 86140 C-REACTIVE PROTEIN: CPT

## 2021-07-13 PROCEDURE — 84439 ASSAY OF FREE THYROXINE: CPT

## 2021-07-13 PROCEDURE — 86431 RHEUMATOID FACTOR QUANT: CPT

## 2021-07-13 PROCEDURE — 36415 COLL VENOUS BLD VENIPUNCTURE: CPT

## 2021-07-13 PROCEDURE — 86039 ANTINUCLEAR ANTIBODIES (ANA): CPT

## 2021-07-13 PROCEDURE — 85652 RBC SED RATE AUTOMATED: CPT

## 2021-07-14 LAB
ANA PAT SER IF-IMP: ABNORMAL
ANA PAT SER IF-IMP: ABNORMAL
ANA SER QL IF: POSITIVE
ANA TITR SER IF: ABNORMAL {TITER}
ANA TITR SER IF: ABNORMAL {TITER}
CRP SERPL-MCNC: 31 MG/L (ref 0–8)
T4 FREE SERPL-MCNC: 1.29 NG/DL (ref 0.76–1.46)
TSH SERPL DL<=0.005 MIU/L-ACNC: 4.36 MU/L (ref 0.4–4)
URATE SERPL-MCNC: 4.2 MG/DL

## 2021-07-20 LAB — RHEUMATOID FACT SER NEPH-ACNC: <7 IU/ML

## 2021-08-25 DIAGNOSIS — E03.9 HYPOTHYROIDISM, UNSPECIFIED TYPE: ICD-10-CM

## 2021-08-26 RX ORDER — LEVOTHYROXINE SODIUM 100 UG/1
TABLET ORAL
Qty: 90 TABLET | Refills: 1 | Status: SHIPPED | OUTPATIENT
Start: 2021-08-26 | End: 2021-09-06

## 2021-08-26 NOTE — TELEPHONE ENCOUNTER
Routing refill request to provider for review/approval because:  TSH   Date Value Ref Range Status   07/13/2021 4.36 (H) 0.40 - 4.00 mU/L Final   06/04/2021 3.48 0.40 - 4.00 mU/L Final         Clinton Pate, RN  [facility] Triage Nurse

## 2021-09-04 ENCOUNTER — MYC MEDICAL ADVICE (OUTPATIENT)
Dept: INTERNAL MEDICINE | Facility: CLINIC | Age: 66
End: 2021-09-04

## 2021-09-04 DIAGNOSIS — E03.9 HYPOTHYROIDISM, UNSPECIFIED TYPE: Primary | ICD-10-CM

## 2021-09-06 RX ORDER — LEVOTHYROXINE SODIUM 100 UG/1
TABLET ORAL
Qty: 90 TABLET | Refills: 3 | Status: SHIPPED | OUTPATIENT
Start: 2021-09-06 | End: 2022-03-01

## 2021-09-08 ENCOUNTER — MYC MEDICAL ADVICE (OUTPATIENT)
Dept: INTERNAL MEDICINE | Facility: CLINIC | Age: 66
End: 2021-09-08

## 2021-09-08 NOTE — TELEPHONE ENCOUNTER
Please see mychart from patient and advise appropriate course of action.      Clinton Pate RN  [facility] Triage Nurse

## 2021-09-10 ENCOUNTER — HOSPITAL ENCOUNTER (INPATIENT)
Facility: CLINIC | Age: 66
LOS: 3 days | Discharge: HOME | End: 2021-09-13
Attending: EMERGENCY MEDICINE | Admitting: INTERNAL MEDICINE
Payer: COMMERCIAL

## 2021-09-10 DIAGNOSIS — R04.2 HEMOPTYSIS: ICD-10-CM

## 2021-09-10 DIAGNOSIS — J44.0 CHRONIC OBSTRUCTIVE PULMONARY DISEASE WITH ACUTE LOWER RESPIRATORY INFECTION (H): ICD-10-CM

## 2021-09-10 LAB
ANION GAP SERPL CALCULATED.3IONS-SCNC: 4 MMOL/L (ref 3–14)
BASOPHILS # BLD AUTO: 0 10E3/UL (ref 0–0.2)
BASOPHILS NFR BLD AUTO: 0 %
BUN SERPL-MCNC: 12 MG/DL (ref 7–30)
CALCIUM SERPL-MCNC: 9.5 MG/DL (ref 8.5–10.1)
CHLORIDE BLD-SCNC: 107 MMOL/L (ref 94–109)
CO2 SERPL-SCNC: 27 MMOL/L (ref 20–32)
CREAT SERPL-MCNC: 0.69 MG/DL (ref 0.52–1.04)
D DIMER PPP FEU-MCNC: 0.62 UG/ML FEU (ref 0–0.5)
EOSINOPHIL # BLD AUTO: 0.2 10E3/UL (ref 0–0.7)
EOSINOPHIL NFR BLD AUTO: 2 %
ERYTHROCYTE [DISTWIDTH] IN BLOOD BY AUTOMATED COUNT: 14 % (ref 10–15)
GFR SERPL CREATININE-BSD FRML MDRD: >90 ML/MIN/1.73M2
GLUCOSE BLD-MCNC: 96 MG/DL (ref 70–99)
HCT VFR BLD AUTO: 47.3 % (ref 35–47)
HGB BLD-MCNC: 15.2 G/DL (ref 11.7–15.7)
HOLD SPECIMEN: NORMAL
IMM GRANULOCYTES # BLD: 0 10E3/UL
IMM GRANULOCYTES NFR BLD: 0 %
LYMPHOCYTES # BLD AUTO: 1.9 10E3/UL (ref 0.8–5.3)
LYMPHOCYTES NFR BLD AUTO: 18 %
MCH RBC QN AUTO: 29.6 PG (ref 26.5–33)
MCHC RBC AUTO-ENTMCNC: 32.1 G/DL (ref 31.5–36.5)
MCV RBC AUTO: 92 FL (ref 78–100)
MONOCYTES # BLD AUTO: 0.9 10E3/UL (ref 0–1.3)
MONOCYTES NFR BLD AUTO: 8 %
NEUTROPHILS # BLD AUTO: 7.6 10E3/UL (ref 1.6–8.3)
NEUTROPHILS NFR BLD AUTO: 72 %
NRBC # BLD AUTO: 0 10E3/UL
NRBC BLD AUTO-RTO: 0 /100
PLATELET # BLD AUTO: 312 10E3/UL (ref 150–450)
POTASSIUM BLD-SCNC: 4 MMOL/L (ref 3.4–5.3)
PROCALCITONIN SERPL-MCNC: <0.05 NG/ML
RBC # BLD AUTO: 5.13 10E6/UL (ref 3.8–5.2)
SARS-COV-2 RNA RESP QL NAA+PROBE: NEGATIVE
SODIUM SERPL-SCNC: 138 MMOL/L (ref 133–144)
WBC # BLD AUTO: 10.7 10E3/UL (ref 4–11)

## 2021-09-10 PROCEDURE — 84145 PROCALCITONIN (PCT): CPT | Performed by: EMERGENCY MEDICINE

## 2021-09-10 PROCEDURE — 99223 1ST HOSP IP/OBS HIGH 75: CPT | Mod: AI | Performed by: INTERNAL MEDICINE

## 2021-09-10 PROCEDURE — 36415 COLL VENOUS BLD VENIPUNCTURE: CPT | Performed by: EMERGENCY MEDICINE

## 2021-09-10 PROCEDURE — 250N000011 HC RX IP 250 OP 636: Performed by: EMERGENCY MEDICINE

## 2021-09-10 PROCEDURE — 99291 CRITICAL CARE FIRST HOUR: CPT | Mod: 25

## 2021-09-10 PROCEDURE — 87040 BLOOD CULTURE FOR BACTERIA: CPT | Performed by: EMERGENCY MEDICINE

## 2021-09-10 PROCEDURE — 80048 BASIC METABOLIC PNL TOTAL CA: CPT | Performed by: EMERGENCY MEDICINE

## 2021-09-10 PROCEDURE — 250N000013 HC RX MED GY IP 250 OP 250 PS 637: Performed by: INTERNAL MEDICINE

## 2021-09-10 PROCEDURE — 96365 THER/PROPH/DIAG IV INF INIT: CPT

## 2021-09-10 PROCEDURE — 120N000001 HC R&B MED SURG/OB

## 2021-09-10 PROCEDURE — C9803 HOPD COVID-19 SPEC COLLECT: HCPCS

## 2021-09-10 PROCEDURE — 87635 SARS-COV-2 COVID-19 AMP PRB: CPT | Performed by: EMERGENCY MEDICINE

## 2021-09-10 PROCEDURE — 250N000009 HC RX 250: Performed by: EMERGENCY MEDICINE

## 2021-09-10 PROCEDURE — 85379 FIBRIN DEGRADATION QUANT: CPT | Performed by: EMERGENCY MEDICINE

## 2021-09-10 PROCEDURE — 96368 THER/DIAG CONCURRENT INF: CPT

## 2021-09-10 PROCEDURE — 94640 AIRWAY INHALATION TREATMENT: CPT

## 2021-09-10 PROCEDURE — 85025 COMPLETE CBC W/AUTO DIFF WBC: CPT | Performed by: EMERGENCY MEDICINE

## 2021-09-10 RX ORDER — DOXYCYCLINE 100 MG/10ML
100 INJECTION, POWDER, LYOPHILIZED, FOR SOLUTION INTRAVENOUS ONCE
Status: COMPLETED | OUTPATIENT
Start: 2021-09-10 | End: 2021-09-10

## 2021-09-10 RX ORDER — LIDOCAINE 40 MG/G
CREAM TOPICAL
Status: DISCONTINUED | OUTPATIENT
Start: 2021-09-10 | End: 2021-09-13 | Stop reason: HOSPADM

## 2021-09-10 RX ORDER — LEVOTHYROXINE SODIUM 100 UG/1
100 TABLET ORAL DAILY
Status: DISCONTINUED | OUTPATIENT
Start: 2021-09-11 | End: 2021-09-13 | Stop reason: HOSPADM

## 2021-09-10 RX ORDER — ALBUTEROL SULFATE 90 UG/1
2 AEROSOL, METERED RESPIRATORY (INHALATION) 4 TIMES DAILY PRN
Status: DISCONTINUED | OUTPATIENT
Start: 2021-09-10 | End: 2021-09-13 | Stop reason: HOSPADM

## 2021-09-10 RX ORDER — CEFTRIAXONE 2 G/1
2 INJECTION, POWDER, FOR SOLUTION INTRAMUSCULAR; INTRAVENOUS EVERY 24 HOURS
Status: DISCONTINUED | OUTPATIENT
Start: 2021-09-11 | End: 2021-09-13 | Stop reason: HOSPADM

## 2021-09-10 RX ORDER — ALBUTEROL SULFATE 90 UG/1
2 AEROSOL, METERED RESPIRATORY (INHALATION) 4 TIMES DAILY
Status: DISCONTINUED | OUTPATIENT
Start: 2021-09-10 | End: 2021-09-13 | Stop reason: HOSPADM

## 2021-09-10 RX ORDER — CEFTRIAXONE 2 G/1
2 INJECTION, POWDER, FOR SOLUTION INTRAMUSCULAR; INTRAVENOUS ONCE
Status: COMPLETED | OUTPATIENT
Start: 2021-09-10 | End: 2021-09-10

## 2021-09-10 RX ORDER — DOXYCYCLINE 100 MG/1
100 CAPSULE ORAL EVERY 12 HOURS SCHEDULED
Status: DISCONTINUED | OUTPATIENT
Start: 2021-09-10 | End: 2021-09-13 | Stop reason: HOSPADM

## 2021-09-10 RX ORDER — ONDANSETRON 2 MG/ML
4 INJECTION INTRAMUSCULAR; INTRAVENOUS EVERY 6 HOURS PRN
Status: DISCONTINUED | OUTPATIENT
Start: 2021-09-10 | End: 2021-09-13 | Stop reason: HOSPADM

## 2021-09-10 RX ORDER — IOPAMIDOL 755 MG/ML
500 INJECTION, SOLUTION INTRAVASCULAR ONCE
Status: COMPLETED | OUTPATIENT
Start: 2021-09-10 | End: 2021-09-10

## 2021-09-10 RX ORDER — ONDANSETRON 4 MG/1
4 TABLET, ORALLY DISINTEGRATING ORAL EVERY 6 HOURS PRN
Status: DISCONTINUED | OUTPATIENT
Start: 2021-09-10 | End: 2021-09-13 | Stop reason: HOSPADM

## 2021-09-10 RX ADMIN — SODIUM CHLORIDE 86 ML: 9 INJECTION, SOLUTION INTRAVENOUS at 14:01

## 2021-09-10 RX ADMIN — ALBUTEROL SULFATE 2 PUFF: 90 AEROSOL, METERED RESPIRATORY (INHALATION) at 20:41

## 2021-09-10 RX ADMIN — IOPAMIDOL 65 ML: 755 INJECTION, SOLUTION INTRAVENOUS at 14:01

## 2021-09-10 RX ADMIN — DOXYCYCLINE HYCLATE 100 MG: 100 CAPSULE ORAL at 22:18

## 2021-09-10 RX ADMIN — DOXYCYCLINE 100 MG: 100 INJECTION, POWDER, LYOPHILIZED, FOR SOLUTION INTRAVENOUS at 15:56

## 2021-09-10 RX ADMIN — CEFTRIAXONE 2 G: 2 INJECTION, POWDER, FOR SOLUTION INTRAMUSCULAR; INTRAVENOUS at 15:55

## 2021-09-10 ASSESSMENT — ACTIVITIES OF DAILY LIVING (ADL)
WALKING_OR_CLIMBING_STAIRS_DIFFICULTY: NO
CONCENTRATING,_REMEMBERING_OR_MAKING_DECISIONS_DIFFICULTY: NO
VISION_MANAGEMENT: WEARS GLASSES
DIFFICULTY_COMMUNICATING: NO
HEARING_DIFFICULTY_OR_DEAF: NO
DOING_ERRANDS_INDEPENDENTLY_DIFFICULTY: NO
FALL_HISTORY_WITHIN_LAST_SIX_MONTHS: NO
ADLS_ACUITY_SCORE: 14
TOILETING_ISSUES: NO
DIFFICULTY_EATING/SWALLOWING: NO
DRESSING/BATHING_DIFFICULTY: NO

## 2021-09-10 NOTE — PHARMACY-ADMISSION MEDICATION HISTORY
Admission medication history interview status for this patient is complete. See [facility] admission navigator for allergy information, prior to admission medications and immunization status.     Medication history interview done, indicate source(s): Patient  Medication history resources (including written lists, pill bottles, clinic record):None  Pharmacy: -    Changes made to PTA medication list:  Added: -  Changed: - advair to prn  Reported as Not Taking: -  Removed: -    Actions taken by pharmacist (provider contacted, etc):None     Additional medication history information:None    Medication reconciliation/reorder completed by provider prior to medication history?  yes   (Y/N)     For patients on insulin therapy:   Do you use sliding scale insulin based on blood sugars?   What is your pre-meal insulin coverage?    Do you typically eat three meals a day?   How many times do you check your blood glucose per day?   How many episodes of hypoglycemia do you typically have per month?   Do you have a Continuous Glucose Monitor (CGM)?      Prior to Admission medications    Medication Sig Last Dose Taking? Auth Provider   fluticasone-salmeterol (ADVAIR-HFA) 115-21 MCG/ACT inhaler Inhale 2 puffs into the lungs 2 times daily  Patient taking differently: Inhale 2 puffs into the lungs 2 times daily as needed   Yes Leonardo Angela MD   levothyroxine (SYNTHROID/LEVOTHROID) 100 MCG tablet TAKE 1 TABLET(100 MCG) BY MOUTH DAILY 9/10/2021 at Unknown time Yes Leonardo Angela MD

## 2021-09-10 NOTE — ED PROVIDER NOTES
History   Chief Complaint:  Coughing up blood       The history is provided by the patient.      Irene Haile is a 65 year old female with history of COPD, hypothyroidism, and hyperlipidemia who presents with coughing up blood. At 1030 today the patient began coughing up about 1/2 cup of bright red blood followed by shortness of breath. She was eating string cheese when it occurred and reports she did not choke and thought it was a normal cough until she tasted blood. The patient does have a history of COPD which leaves her with a baseline cough that worsens when laying down. She denies a fever, nausea, vomiting, chest pain, or abdominal pain. She has had no recent travel or surgery. The patient is covid vaccinated.     Review of Systems   All other systems reviewed and are negative.      Allergies:  Augmented Betamethasone Diprop [Betamethasone]  Codeine  Nitrofurantoin  Shingrix [Zoster Vac Recomb Adjuvanted]    Medications:  fluticasone-salmeterol inhaler  levothyroxine     Past Medical History:    Axillary adenopathy  chronic obstructive pulmonary disease  Hypothyroidism  history of tobacco use  Pulmonary hemorrhage  urinary tract infection  Hyperlipidemia      Past Surgical History:    Total abdomen hysterectomy  Laparoscopic appendectomy      Family History:    Diabetes  Breast cancer  Alcohol/ drug  Colorectal cancer  Sjogren's     Social History:  Presents with    PCP: Leonardo Angela   Tobacco usage.     Physical Exam     Patient Vitals for the past 24 hrs:   BP Temp Temp src Pulse Resp SpO2 Weight   09/10/21 1600 132/80 -- -- 81 18 97 % --   09/10/21 1400 -- -- -- -- -- 95 % --   09/10/21 1330 (!) 121/90 -- -- -- 18 95 % --   09/10/21 1204 (!) 162/68 98.2  F (36.8  C) Oral 96 20 93 % 68 kg (150 lb)       Physical Exam  General: Resting on the bed.  Head: No obvious trauma to head.  Ears, Nose, Throat:  External ears normal.  Nose normal.  Clear oral pharynx   Eyes:  Conjunctivae clear.  Pupils  are equal, round, and reactive.   Neck: Normal range of motion.  Neck supple.   CV: Regular rate and rhythm.  No murmurs.      Respiratory: Effort normal and breath sounds normal.  No wheezing or crackles.   Gastrointestinal: Soft.  No distension. There is no tenderness.    Musculoskeletal: Non tender non edematous calves  Neuro: Alert. Moving all extremities appropriately.  Normal speech.    Skin: Skin is warm and dry.  No rash noted.   Emergency Department Course   Imaging:  CT Chest Pulmonary Embolism w/ IV contrast:  1.  Interstitial lung disease appears new compared to the prior study   and is associated with subpleural cystic change and traction   bronchiectasis.   2.  Areas of mucous plugging in bronchiectatic lower lobe airway   suggestive of a superimposed infectious process. These are associated   with patchy groundglass opacities.   3.  Unilateral left axillary lymphadenopathy. This could be associated   with vaccination or inflammatory process in the left arm.   4.  Periportal lymphadenopathy incompletely evaluated on this study of   the chest only. There are also abnormalities in what appears to be an   enlarged body of the pancreas. Follow-up pancreatic MRI recommended.   5.  No evidence of pulmonary embolism.   Per radiology.    Laboratory:  CBC: WBC 10.7, HGB 15.2,   BMP: All WNL (Creatinine 0.69)     Ddimer: 0.62 (H)  Procalcitonin: Pending  Asymptomatic COVID19 Virus PCR by nasopharyngeal swab: pending     Respiratory Aerobic bacterial Culture: Pending   Blood culture x2: Pending     Emergency Department Course:    Reviewed:  I reviewed nursing notes, vitals, past medical history and care everywhere    Assessments:  1323 I obtained history and examined the patient as noted above.   1516 I rechecked the patient and explained findings.     Consults:   1508 I consulted with Dr. Davenport Pul[facility], regarding the patient's history and presentation here in the emergency department.  1553 I consulted  with Dr. Sherman of the hospitalist services who is in agreement to accept the patient for admission.     Interventions:  1555 Ceftriaxone, 2 g, IV  1556 Doxycycline, 100 mg, IV     Disposition:  The patient was admitted to the hospital under the care of Dr. Sherman.     Impression & Plan   Medical Decision Makin-year-old female smoker, COPD, presents with coughing up blood.  Vital signs are stable.  Broad differential was pursued include not limited to PE, infection, COPD, bronchitis, anemia, viral illness, mass, tumor, etc.  CT was obtained given the concerning features, CT shows interstitial lung disease and an area superinfection.  Spoke with pulmonology who recommended treatment as possible infectious etiology.  Recommend sputum cultures and blood cultures.  CBC shows no leukocytosis or anemia.  BMP is within normal limits, no suggestion of acute electrolyte, metabolic or renal dysfunction.  Covid swab obtained and pending.  Patient will be admitted to the hospitalist to ensure no further episodes of hemoptysis.  Patient was admitted to the hospitalist who graciously excepted.      Covid-19  Irene Haile was evaluated during a global COVID-19 pandemic, which necessitated consideration that the patient might be at risk for infection with the SARS-CoV-2 virus that causes COVID-19.   Applicable protocols for evaluation were followed during the patient's care.   COVID-19 was considered as part of the patient's evaluation.    Diagnosis:    ICD-10-CM    1. Chronic obstructive pulmonary disease with acute lower respiratory infection (H)  J44.0    2. Hemoptysis  R04.2        Scribe Disclosure:  I, Luci Stern, am serving as a scribe at 1:23 PM on 9/10/2021 to document services personally performed by Kesha Mccoy MD based on my observations and the provider's statements to me.            Kesha Mccoy MD  09/10/21 [id number]

## 2021-09-10 NOTE — ED TRIAGE NOTES
Patient was sitting at her computer, started coughing and could taste blood.  She proceeded to cough and bring up bright red blood, her family member says there was blood all over the sink and on the floor.  Her  almost called 911 he was so concerned.   She states she has coughed up a little blood in the past, but nothing like this.   She denies pain, but she says if she takes a deep breath it will get her coughing again, her sputum still has some red color to it.

## 2021-09-10 NOTE — H&P
[facility]    History and Physical - Hospitalist Service       Date of Admission:  9/10/2021    Assessment & Plan      Irene Haile is a 65 year old female admitted on 9/10/2021. She has a history of COPD and previous episode of hemoptysis 8 years ago and presents with hemoptysis. She is otherwise asymptomatic and chest CT reveals interstitial lung disease, bronchiectasis, mucous plugging and groundglass opacities. Past medical history also significant for hypothyroidism, dyslipidemia, ovarian cancer with resection 20 years ago and right axillary lymph node biopsy in 2013 which showed no evidence of malignancy.    1. Hemoptysis with pulmonary infiltrates and possible community-acquired pneumonia:  --Fortunately respiratory status is stable, she is not hypoxic and otherwise asymptomatic.  --In review of previous records this episode is remarkably similar to that which occurred in 2013. At that time she had a few episodes of hemoptysis 1 day, imaging revealed infiltrates in the lungs, she is treated with antibiotics and infiltrates resolved with no further symptoms.  --Pulmonology did not feel that bronchoscopy was indicated at that point and she had a lymph node biopsy which did not show any sign of malignancy.  --Apparently had a negative ANCA.  --She unfortunately has continued to smoke about a pack a day. Nicotine gum as needed  --Pulmonology was contacted through the emergency department. They recommended a course of antibiotics and inpatient monitoring in case hemorrhage continues.  --We will continue with doxycycline and ceftriaxone which was started today.  --Monitor symptoms, respiratory status and hemoglobin.  --Will need further outpatient follow-up with pulmonology clinic.  --Resume Advair and albuterol  --There is also periportal left axillary lymphadenopathy of unclear significance. Repeat imaging as an outpatient.  --Check ANCA      2. Enlarged pancreas noted incidentally on  CT:  --Obtain MRI of the pancreas at some point    3. Hypothyroidism:  --Resume levothyroxine       Diet:  Regular  DVT Prophylaxis: Pneumatic Compression Devices  Babcock Catheter: Not present  Central Lines: None  Code Status:   Full    Clinically Significant Risk Factors Present on Admission                   Disposition Plan   Expected discharge: 2 days recommended to prior living arrangement once Hemoptysis resolves.     The patient's care was discussed with the ER provider.    Linden Sherman MD  [facility]  Securely message with the Vocera Web Console (learn more here)  Text page via [employer] Paging/Directory      ______________________________________________________________________    Chief Complaint   Coughing up blood    History is obtained from the patient    History of Present Illness   Irene Haile is a 65 year old female with COPD who presents with a few episodes of hemoptysis today. She felt in her normal state of health and had her normal cough when she felt some warmth in her throat and relays she coughed up some blood. She had a few more episodes of hemoptysis and estimates that she coughed up about half a cup of blood. She had no chest pain, no nose bleeding and no other symptoms. She had a similar episode in 2013. At that time she had a CT of the chest which showed an infiltrate. She seen as an outpatient pulmonology clinic was treated with antibiotics and repeat CT showed resolution. She has had no further episodes up until today.    Review of Systems    A complete 10 point review system was performed was negative except for the pertinent positive which be seen in the history of present illness    Past Medical History    I have reviewed this patient's medical history and updated it with pertinent information if needed.   Past Medical History:   Diagnosis Date     Axillary adenopathy 6/5/2013     COPD (chronic obstructive pulmonary disease) (H) 6/5/2013      Hypothyroidism, unspecified type 2017     Personal history of tobacco use, presenting hazards to health      Pulmonary hemorrhage 2013     UTI (urinary tract infection)    --Ovarian cancer 20 years ago with resection and no sign of recurrence  --Axillary lymph node biopsy 8 years ago which was unremarkable    Past Surgical History   I have reviewed this patient's surgical history and updated it with pertinent information if needed.  Past Surgical History:   Procedure Laterality Date     C TOTAL ABDOM HYSTERECTOMY       BSO     ZZC NONSPECIFIC PROCEDURE   2010    Laparoscopic appendectomy       Social History   I have reviewed this patient's social history and updated it with pertinent information if needed.  Social History     Tobacco Use     Smoking status: Current Every Day Smoker     Packs/day: 0.50     Years: 20.00     Pack years: 10.00     Types: Cigarettes     Smokeless tobacco: Never Used     Tobacco comment:     Substance Use Topics     Alcohol use: No     Drug use: No       Family History   I have reviewed this patient's family history and updated it with pertinent information if needed.  Family History   Problem Relation Age of Onset     Diabetes Brother         Diag. age 41     Breast Cancer Mother      Cancer Mother         All over her body     Alcohol/Drug Father         Has Liver Problems     Cancer - colorectal Brother         before age 50     Cancer Brother         liver cancer      Family History Negative Brother         ulcerative colitis     Family History Negative Sister      Sjogren's Daughter        Prior to Admission Medications   Prior to Admission Medications   Prescriptions Last Dose Informant Patient Reported? Taking?   fluticasone-salmeterol (ADVAIR-HFA) 115-21 MCG/ACT inhaler   No No   Sig: Inhale 2 puffs into the lungs 2 times daily   levothyroxine (SYNTHROID/LEVOTHROID) 100 MCG tablet   No No   Sig: TAKE 1 TABLET(100 MCG) BY MOUTH DAILY       Facility-Administered Medications: None     Allergies   Allergies   Allergen Reactions     Augmented Betamethasone Diprop [Betamethasone] GI Disturbance     Codeine      Insomnia       Nitrofurantoin      Nausea, loss appetite     Shingrix [Zoster Vac Recomb Adjuvanted]      Severe body pain for 3-4 weeks       Physical Exam   Vital Signs: Temp: 98.2  F (36.8  C) Temp src: Oral BP: 132/80 Pulse: 81   Resp: 18 SpO2: 97 % O2 Device: None (Room air)    Weight: 150 lbs 0 oz      Vital signs reviewed  General:  Alert, calm, NAD  CV: regular rate and rhythm, no murmurs or rubs  Lungs:  Clear to ascultation bilaterally, normal respiratory effort  HEENT:  Pupil round, equal, conjuctivae, sclerae and lids normal, neck is supple, no evidence of bleeding in the nares  Abdomen:  Soft, nontender, nondistended, no masses, normal bowel sounds  Extremities:  No edema  Neuro: normal strength and sensation in all 4 extremities, cranial nerves grossly intact  Psychiatric:  Mood and affect within normal limits  Skin:  No rashes or wounds evident      Data   Data reviewed today: I reviewed all medications, new labs and imaging results over the last 24 hours.    Recent Labs   Lab 09/10/21  1321   WBC 10.7   HGB 15.2   MCV 92         POTASSIUM 4.0   CHLORIDE 107   CO2 27   BUN 12   CR 0.69   ANIONGAP 4   JOVANI 9.5   GLC 96     Recent Results (from the past 24 hour(s))   CT Chest Pulmonary Embolism w Contrast    Narrative    CT CHEST PULMONARY EMBOLISM WITH CONTRAST 9/10/2021 2:16 PM    CLINICAL HISTORY: Hemoptysis.    TECHNIQUE: CT angiogram chest during arterial phase injection IV  contrast. 2D and 3D MIP reconstructions were performed by the CT  technologist. Dose reduction techniques were used.     CONTRAST: 65mL Isovue-370    COMPARISON: 6/26/2013    FINDINGS:  ANGIOGRAM CHEST: Pulmonary arteries are normal caliber and negative  for pulmonary emboli. Thoracic aorta is negative for dissection. No CT  evidence of right  heart strain.    LUNGS AND PLEURA: There are interstitial lung abnormalities with  peripheral cystic formation, new compared to the prior study. There  are patchy groundglass opacities at the lung bases. Also the lung  bases, there are areas of bronchiectasis and mucous plugging. No  pulmonary nodule or mass. No effusions.    MEDIASTINUM/AXILLAE: No pathologically enlarged thoracic lymph nodes.  There is left axillary adenopathy with a lymph node measuring up to  1.2 cm in short axis (series 7, image 33).    CORONARY ARTERY CALCIFICATION: Mild.    UPPER ABDOMEN: There are enlarged periportal lymph nodes, measuring up  to 1.4 cm in short axis (series 7, image 142). There are cystic  lesions in what appears to be an enlarged pancreatic tail. Largest  cystic lesion measures up to 1.5 cm (series 7, image 148), previously  0.7 cm.    MUSCULOSKELETAL: No destructive bone lesions.      Impression    IMPRESSION:  1.  Interstitial lung disease appears new compared to the prior study  and is associated with subpleural cystic change and traction  bronchiectasis.  2.  Areas of mucous plugging in bronchiectatic lower lobe airway  suggestive of a superimposed infectious process. These are associated  with patchy groundglass opacities.  3.  Unilateral left axillary lymphadenopathy. This could be associated  with vaccination or inflammatory process in the left arm.  4.  Periportal lymphadenopathy incompletely evaluated on this study of  the chest only. There are also abnormalities in what appears to be an  enlarged body of the pancreas. Follow-up pancreatic MRI recommended.  5.  No evidence of pulmonary embolism.    POLO JACKSON MD         SYSTEM ID:  [id number]

## 2021-09-10 NOTE — ED NOTES
[facility]  ED Nurse Handoff Report    Irene Haile is a 65 year old female   ED Chief complaint: Coughing up blood  . ED Diagnosis:   Final diagnoses:   Chronic obstructive pulmonary disease with acute lower respiratory infection (H)   Hemoptysis     Allergies:   Allergies   Allergen Reactions     Augmented Betamethasone Diprop [Betamethasone] GI Disturbance     Codeine      Insomnia       Nitrofurantoin      Nausea, loss appetite     Shingrix [Zoster Vac Recomb Adjuvanted]      Severe body pain for 3-4 weeks       Code Status: Full Code  Activity level - Baseline/Home:  Independent. Activity Level - Current:   Independent. Lift room needed: No. Bariatric: No   Needed: No   Isolation: No. Infection: Not Applicable.     Vital Signs:   Vitals:    09/10/21 1204 09/10/21 1330 09/10/21 1400 09/10/21 1600   BP: (!) 162/68 (!) 121/90  132/80   Pulse: 96   81   Resp: 20 18  18   Temp: 98.2  F (36.8  C)      TempSrc: Oral      SpO2: 93% 95% 95% 97%   Weight: 68 kg (150 lb)          Cardiac Rhythm:  ,      Pain level:    Patient confused: No. Patient Falls Risk: No.   Elimination Status: Has voided   Patient Report - Initial Complaint: Coughing up Blood. Focused Assessment: Respiratory   Tests Performed:   CT Chest Pulmonary Embolism w Contrast   Final Result   IMPRESSION:   1.  Interstitial lung disease appears new compared to the prior study   and is associated with subpleural cystic change and traction   bronchiectasis.   2.  Areas of mucous plugging in bronchiectatic lower lobe airway   suggestive of a superimposed infectious process. These are associated   with patchy groundglass opacities.   3.  Unilateral left axillary lymphadenopathy. This could be associated   with vaccination or inflammatory process in the left arm.   4.  Periportal lymphadenopathy incompletely evaluated on this study of   the chest only. There are also abnormalities in what appears to be an   enlarged body of the  pancreas. Follow-up pancreatic MRI recommended.   5.  No evidence of pulmonary embolism.      POLO JACKSON MD            SYSTEM ID:  [id number]      . Abnormal Results:   Labs Ordered and Resulted from Time of ED Arrival Up to the Time of Departure from the ED   D DIMER QUANTITATIVE - Abnormal; Notable for the following components:       Result Value    D-Dimer Quantitative 0.62 (*)     All other components within normal limits    Narrative:     This D-dimer assay is intended for use in conjunction with a clinical pretest probability assessment model to exclude pulmonary embolism (PE) and deep venous thrombosis (DVT) in outpatients suspected of PE or DVT. The cut-off value is 0.50 ug/mL FEU.   CBC WITH PLATELETS AND DIFFERENTIAL - Abnormal; Notable for the following components:    Hematocrit 47.3 (*)     All other components within normal limits   BASIC METABOLIC PANEL - Normal   CBC WITH PLATELETS & DIFFERENTIAL    Narrative:     The following orders were created for panel order CBC with platelets differential.  Procedure                               Abnormality         Status                     ---------                               -----------         ------                     CBC with platelets and d...[[id number]]  Abnormal            Final result                 Please view results for these tests on the individual orders.   PROCALCITONIN   EXTRA PURPLE TOP TUBE   COVID-19 VIRUS (CORONAVIRUS) BY PCR   RESPIRATORY AEROBIC BACTERIAL CULTURE   BLOOD CULTURE   BLOOD CULTURE   EXTRA TUBE    Narrative:     The following orders were created for panel order Extra Tube.  Procedure                               Abnormality         Status                     ---------                               -----------         ------                     Extra Purple Top Tube[[id number]]                            In process                   Please view results for these tests on the individual orders.   .   Treatments  provided: Blood cultures and rocephin 2g and vibramycin 100 mg  Family Comments:  at bedside  OBS brochure/video discussed/provided to patient:  N/A  ED Medications:   Medications   cefTRIAXone (ROCEPHIN) 2 g vial to attach to  ml bag for ADULTS or NS 50 ml bag for PEDS (2 g Intravenous New Bag 9/10/21 1555)   doxycycline (VIBRAMYCIN) 100 mg vial to attach to  mL bag (100 mg Intravenous New Bag 9/10/21 1556)   CT Scan Flush (86 mLs Intravenous Given 9/10/21 1401)   iopamidol (ISOVUE-370) solution 500 mL (65 mLs Intravenous Given 9/10/21 1401)     Drips infusing:  Yes  For the majority of the shift, the patient's behavior Green. Interventions performed were none.    Sepsis treatment initiated: No     Patient tested for COVID 19 prior to admission: YES    ED Nurse Name/Phone Number: Gabriela Roe RN,   4:02 PM    RECEIVING UNIT ED HANDOFF REVIEW    Above ED Nurse Handoff Report was reviewed: Yes  Reviewed by: David Ellison RN on September 10, 2021 at 5:23 PM

## 2021-09-11 LAB
CRP SERPL-MCNC: 16.3 MG/L (ref 0–8)
ERYTHROCYTE [DISTWIDTH] IN BLOOD BY AUTOMATED COUNT: 14 % (ref 10–15)
HCT VFR BLD AUTO: 40.3 % (ref 35–47)
HGB BLD-MCNC: 13 G/DL (ref 11.7–15.7)
MCH RBC QN AUTO: 29 PG (ref 26.5–33)
MCHC RBC AUTO-ENTMCNC: 32.3 G/DL (ref 31.5–36.5)
MCV RBC AUTO: 90 FL (ref 78–100)
PLATELET # BLD AUTO: 278 10E3/UL (ref 150–450)
RBC # BLD AUTO: 4.49 10E6/UL (ref 3.8–5.2)
WBC # BLD AUTO: 8.5 10E3/UL (ref 4–11)

## 2021-09-11 PROCEDURE — 86140 C-REACTIVE PROTEIN: CPT | Performed by: INTERNAL MEDICINE

## 2021-09-11 PROCEDURE — 999N000105 HC STATISTIC NO DOCUMENTATION TO SUPPORT CHARGE

## 2021-09-11 PROCEDURE — 36415 COLL VENOUS BLD VENIPUNCTURE: CPT | Performed by: INTERNAL MEDICINE

## 2021-09-11 PROCEDURE — 99232 SBSQ HOSP IP/OBS MODERATE 35: CPT | Performed by: INTERNAL MEDICINE

## 2021-09-11 PROCEDURE — 250N000013 HC RX MED GY IP 250 OP 250 PS 637: Performed by: INTERNAL MEDICINE

## 2021-09-11 PROCEDURE — 94640 AIRWAY INHALATION TREATMENT: CPT | Mod: 76

## 2021-09-11 PROCEDURE — 999N000157 HC STATISTIC RCP TIME EA 10 MIN

## 2021-09-11 PROCEDURE — 87205 SMEAR GRAM STAIN: CPT | Performed by: EMERGENCY MEDICINE

## 2021-09-11 PROCEDURE — 86255 FLUORESCENT ANTIBODY SCREEN: CPT | Performed by: INTERNAL MEDICINE

## 2021-09-11 PROCEDURE — 94640 AIRWAY INHALATION TREATMENT: CPT

## 2021-09-11 PROCEDURE — 85027 COMPLETE CBC AUTOMATED: CPT | Performed by: INTERNAL MEDICINE

## 2021-09-11 PROCEDURE — 250N000011 HC RX IP 250 OP 636: Performed by: INTERNAL MEDICINE

## 2021-09-11 PROCEDURE — 120N000001 HC R&B MED SURG/OB

## 2021-09-11 RX ADMIN — DOXYCYCLINE HYCLATE 100 MG: 100 CAPSULE ORAL at 09:01

## 2021-09-11 RX ADMIN — ALBUTEROL SULFATE 2 PUFF: 90 AEROSOL, METERED RESPIRATORY (INHALATION) at 08:28

## 2021-09-11 RX ADMIN — FLUTICASONE FUROATE AND VILANTEROL TRIFENATATE 1 PUFF: 100; 25 POWDER RESPIRATORY (INHALATION) at 08:28

## 2021-09-11 RX ADMIN — ALBUTEROL SULFATE 2 PUFF: 90 AEROSOL, METERED RESPIRATORY (INHALATION) at 12:06

## 2021-09-11 RX ADMIN — CEFTRIAXONE 2 G: 2 INJECTION, POWDER, FOR SOLUTION INTRAMUSCULAR; INTRAVENOUS at 16:03

## 2021-09-11 RX ADMIN — ALBUTEROL SULFATE 2 PUFF: 90 AEROSOL, METERED RESPIRATORY (INHALATION) at 15:22

## 2021-09-11 RX ADMIN — DOXYCYCLINE HYCLATE 100 MG: 100 CAPSULE ORAL at 19:05

## 2021-09-11 RX ADMIN — LEVOTHYROXINE SODIUM 100 MCG: 0.1 TABLET ORAL at 07:16

## 2021-09-11 ASSESSMENT — ACTIVITIES OF DAILY LIVING (ADL)
ADLS_ACUITY_SCORE: 14

## 2021-09-11 NOTE — PLAN OF CARE
End of Shift Summary  For vital signs and complete assessments, please see documentation flowsheets.     Pertinent assessments: A/Ox4. VSS on RA. Up independently. LS: expiratory wheezes & diminished. Infrequent productive cough. No reports of bloody sputum overnight.     Major Shift Events: uneventful    Treatment Plan: IV Rocephin, PO doxycycline, monitor vitals, and monitor labs.

## 2021-09-11 NOTE — PLAN OF CARE
Pertinent assessments: VSS on room air. Afebrile. A&Ox4. Up independently. Tolerating a regular diet. Expiratory wheezing in upper lobes. Frequent, productive cough. Pt coughing up bright red blood. Pt given specimen cup to provide sample for culture. Denies shortness of breath. Denies pain.     Major Shift Events: new admit from ER.    Treatment Plan: IV Rocephin, PO doxycycline, monitor vitals, and monitor labs.

## 2021-09-11 NOTE — PROGRESS NOTES
[facility]  Hospitalist Progress Note    Linden Sherman MD 09/11/2021  Text Page      Reason for Stay (Diagnosis):          Assessment and Plan:      Summary of Stay: Irene Haile is a 65 year old female admitted on 9/10/2021. She has a history of COPD and previous episode of hemoptysis 8 years ago and presents with hemoptysis. She is otherwise asymptomatic and chest CT reveals interstitial lung disease, bronchiectasis, mucous plugging and groundglass opacities. Past medical history also significant for hypothyroidism, dyslipidemia, ovarian cancer with resection 20 years ago and right axillary lymph node biopsy in 2013 which showed no evidence of malignancy.     1. Hemoptysis with pulmonary infiltrates and possible community-acquired pneumonia:  --Fortunately respiratory status is stable, she is not hypoxic and otherwise asymptomatic.  --In review of previous records this episode is remarkably similar to that which occurred in 2013. At that time she had a few episodes of hemoptysis 1 day, imaging revealed infiltrates in the lungs, she is treated with antibiotics and infiltrates resolved with no further symptoms.  --Pulmonology did not feel that bronchoscopy was indicated at that point and she had a lymph node biopsy which did not show any sign of malignancy.  --Apparently had a negative ANCA.  --She unfortunately has continued to smoke about a pack a day. Nicotine gum as needed  --Pulmonology was contacted through the emergency department. They recommended a course of antibiotics and inpatient monitoring in case hemorrhage continues.  --We will continue with doxycycline and ceftriaxone which was started yesterday.  --Monitor symptoms, respiratory status and hemoglobin.  --Will need further outpatient follow-up with pulmonology clinic.  --Resume Advair and albuterol  --There is also periportal left axillary lymphadenopathy of unclear significance. Repeat imaging as an outpatient.  --Today  Myrna mentioned that she may have had a few pounds of weight loss over the past several months and that she has been having night sweats  --Checked ANCA.  CRP 16 and procalcitonin undetectable  --Her daughter has been diagnosed with Sjogren's syndrome  --I discussed with pulmonologist on call today at [facility].  He felt bronchoscopy is probably indicated at some point to assess for atypical infection or malignancy.  He agreed with continued abx and if bleeding resolved, could follow up in pulm clinic within 1 week.  Will likely keep her until Monday to formally see pulmonologist, especially if ongoing hemoptysis.           2. Enlarged pancreas noted incidentally on CT:  --Obtain MRI of the pancreas at some point     3. Hypothyroidism:  --Resume levothyroxine       DVT Prophylaxis: Pneumatic Compression Devices  Lines: PIV  Babcock: None  Code Status: Full Code  Disposition Plan   Expected discharge in 2-3 days to prior living arrangement once bleeding resolved.     Entered: Linden Sherman 09/11/2021, 1:58 PM     Incidental Findings/ Discharge Issues: Follow-up MRI of the pancreas in future  I discussed with Myrna and her  today.          Interval History (Subjective):      Feels well.  Had some more hemoptysis yesterday evening and a very scant amount this morning. Denies and dyspnea or pain.                    Physical Exam:      Last Vital Signs:  /59   Pulse 92   Temp 97.4  F (36.3  C) (Oral)   Resp 18   Wt 67.8 kg (149 lb 8 oz)   SpO2 94%   BMI 22.73 kg/m        Vital signs reviewed  General:  Alert, calm, NAD  CV: regular rate and rhythm, no murmurs or rubs  Lungs:  Clear to ascultation bilaterally, normal respiratory effort  HEENT:  Pupil round, equal, conjuctivae, sclerae and lids normal, neck is supple  Abdomen:  Soft, nontender, nondistended, no masses, normal bowel sounds  Extremities:  No edema  Neuro: normal strength and sensation in all 4 extremities, cranial nerves grossly  intact  Psychiatric:  Mood and affect within normal limits  Skin:  No rashes or wounds evident             Medications:      All current medications were reviewed with changes reflected in problem list.         Data:      All new lab and imaging data was reviewed.   Labs:  CRP 16.3  hgb 13 from 15

## 2021-09-11 NOTE — PLAN OF CARE
End of Shift Summary  For vital signs and complete assessments, please see documentation flowsheets.     Pertinent assessments: A/Ox4. VSS on RA. Up independently. LS diminished. Infrequent productive cough, small sputum sample obtained, but not large enough to send. Sputum bloody, bright red.    Major Shift Events: uneventful    Treatment Plan: IV Rocephin, PO doxycycline, monitor vitals, and monitor labs.

## 2021-09-12 ENCOUNTER — APPOINTMENT (OUTPATIENT)
Dept: MRI IMAGING | Facility: CLINIC | Age: 66
End: 2021-09-12
Attending: INTERNAL MEDICINE
Payer: COMMERCIAL

## 2021-09-12 PROCEDURE — A9585 GADOBUTROL INJECTION: HCPCS | Performed by: INTERNAL MEDICINE

## 2021-09-12 PROCEDURE — 250N000013 HC RX MED GY IP 250 OP 250 PS 637: Performed by: INTERNAL MEDICINE

## 2021-09-12 PROCEDURE — 94640 AIRWAY INHALATION TREATMENT: CPT | Mod: 76

## 2021-09-12 PROCEDURE — 250N000011 HC RX IP 250 OP 636: Performed by: INTERNAL MEDICINE

## 2021-09-12 PROCEDURE — 74183 MRI ABD W/O CNTR FLWD CNTR: CPT

## 2021-09-12 PROCEDURE — 999N000157 HC STATISTIC RCP TIME EA 10 MIN

## 2021-09-12 PROCEDURE — 255N000002 HC RX 255 OP 636: Performed by: INTERNAL MEDICINE

## 2021-09-12 PROCEDURE — 99232 SBSQ HOSP IP/OBS MODERATE 35: CPT | Performed by: INTERNAL MEDICINE

## 2021-09-12 PROCEDURE — 94640 AIRWAY INHALATION TREATMENT: CPT

## 2021-09-12 PROCEDURE — 120N000001 HC R&B MED SURG/OB

## 2021-09-12 RX ORDER — GADOBUTROL 604.72 MG/ML
7.5 INJECTION INTRAVENOUS ONCE
Status: COMPLETED | OUTPATIENT
Start: 2021-09-12 | End: 2021-09-12

## 2021-09-12 RX ADMIN — GADOBUTROL 6.5 ML: 604.72 INJECTION INTRAVENOUS at 18:00

## 2021-09-12 RX ADMIN — DOXYCYCLINE HYCLATE 100 MG: 100 CAPSULE ORAL at 08:23

## 2021-09-12 RX ADMIN — ALBUTEROL SULFATE 2 PUFF: 90 AEROSOL, METERED RESPIRATORY (INHALATION) at 12:49

## 2021-09-12 RX ADMIN — LEVOTHYROXINE SODIUM 100 MCG: 0.1 TABLET ORAL at 06:21

## 2021-09-12 RX ADMIN — FLUTICASONE FUROATE AND VILANTEROL TRIFENATATE 1 PUFF: 100; 25 POWDER RESPIRATORY (INHALATION) at 08:26

## 2021-09-12 RX ADMIN — ALBUTEROL SULFATE 2 PUFF: 90 AEROSOL, METERED RESPIRATORY (INHALATION) at 16:05

## 2021-09-12 RX ADMIN — CEFTRIAXONE 2 G: 2 INJECTION, POWDER, FOR SOLUTION INTRAMUSCULAR; INTRAVENOUS at 16:02

## 2021-09-12 RX ADMIN — ALBUTEROL SULFATE 2 PUFF: 90 AEROSOL, METERED RESPIRATORY (INHALATION) at 19:42

## 2021-09-12 RX ADMIN — DOXYCYCLINE HYCLATE 100 MG: 100 CAPSULE ORAL at 19:38

## 2021-09-12 RX ADMIN — ALBUTEROL SULFATE 2 PUFF: 90 AEROSOL, METERED RESPIRATORY (INHALATION) at 08:21

## 2021-09-12 ASSESSMENT — ACTIVITIES OF DAILY LIVING (ADL)
ADLS_ACUITY_SCORE: 14

## 2021-09-12 NOTE — PLAN OF CARE
Pertinent assessments: VSS on room air. Afebrile. A&Ox4. Up independently. LS diminished. Infrequent, productive cough. Sputum is bloody & bright red in color.    Major Shift Events: sputum sample sent to lab for culturing.    Treatment Plan: IV Rocephin, PO doxycycline, monitor vitals, and monitor labs. Pulmonology consult.

## 2021-09-12 NOTE — PROGRESS NOTES
[facility]    Medicine Progress Note - Hospitalist Service       Date of Admission:  9/10/2021    Assessment & Plan           Irene Haile is a 65 year old female admitted on 9/10/2021. She has a history of COPD and previous episode of hemoptysis 8 years ago and presents with hemoptysis. She is otherwise asymptomatic and chest CT reveals interstitial lung disease, bronchiectasis, mucous plugging and groundglass opacities. Past medical history also significant for hypothyroidism, dyslipidemia, ovarian cancer with resection 20 years ago and right axillary lymph node biopsy in 2013 which showed no evidence of malignancy.    1.  Suspected community-acquired pneumonia.  As evidenced by CT scan of the chest.  Also noted to have bronchiectasis and mucous plugging.  Started on antibiotics with doxycycline and ceftriaxone.  -Continue doxycycline and IV ceftriaxone.    2.  Hemoptysis.  Suspect due to pneumonia.  -Recheck CBC tomorrow.  -Continue antibiotics as noted above.  -Pulmonology consult pending.    3.  Hypothyroidism.  Continue levothyroxine.    4.  Enlarged pancreas and scattered lymphadenopathy.  -Check MRI of the abdomen for further evaluation.    5.  Tobacco use disorder.  Nicotine gum if needed.    6.  COPD.  No obvious acute exacerbation.  -Continue fluticasone/Vilanterol.  -Albuterol if needed.       Diet: Combination Diet Regular Diet Adult    DVT Prophylaxis: Pneumatic Compression Devices  Babcock Catheter: Not present  Central Lines: None  Code Status: Full Code          Shaun Hinton DO  Hospitalist Service  [facility]  Securely message with the Vocera Web Console (learn more here)  Text page via [employer] Paging/Directory      Clinically Significant Risk Factors Present on Admission               ______________________________________________________________________    Interval History   Still coughing.  Less blood in sputum today.  Denies chest pain,  shortness of breath, fevers, chills, nausea, or vomiting.    Data reviewed today: I reviewed all medications, new labs and imaging results over the last 24 hours.    Physical Exam   Vital Signs: Temp: 98.2  F (36.8  C) Temp src: Oral BP: 103/54 Pulse: 68   Resp: 20 SpO2: 97 % O2 Device: None (Room air)    Weight: 149 lbs 8 oz  Gen:  NAD, A&Ox3.  Eyes:  PERRL, sclera anicteric.  OP:  MMM, no lesions.  Neck:  Supple.  CV:  Regular, no murmurs.  Lung:  CTA b/l, normal effort.  Ab:  +BS, soft.  Skin:  Warm, dry to touch.  No rash.  Ext:  No pitting edema LE b/l.      Data   Recent Labs   Lab 09/11/21  0647 09/10/21  1321   WBC 8.5 10.7   HGB 13.0 15.2   MCV 90 92    312   NA  --  138   POTASSIUM  --  4.0   CHLORIDE  --  107   CO2  --  27   BUN  --  12   CR  --  0.69   ANIONGAP  --  4   JOVANI  --  9.5   GLC  --  96

## 2021-09-12 NOTE — PLAN OF CARE
End of Shift Summary  For vital signs and complete assessments, please see documentation flowsheets.     Pertinent assessments: VSS on RA. A&Ox4. Up independently. LS diminished. Infrequent, productive cough. Pt reports improvement in cough.    Major Shift Events: uneventful    Treatment Plan: IV Rocephin, PO doxycycline, monitor vitals, and monitor labs. Pulmonology consult.

## 2021-09-13 ENCOUNTER — APPOINTMENT (OUTPATIENT)
Dept: ULTRASOUND IMAGING | Facility: CLINIC | Age: 66
End: 2021-09-13
Attending: INTERNAL MEDICINE
Payer: COMMERCIAL

## 2021-09-13 VITALS
SYSTOLIC BLOOD PRESSURE: 119 MMHG | OXYGEN SATURATION: 95 % | HEART RATE: 92 BPM | RESPIRATION RATE: 16 BRPM | TEMPERATURE: 98.6 F | WEIGHT: 149.5 LBS | BODY MASS INDEX: 22.73 KG/M2 | DIASTOLIC BLOOD PRESSURE: 64 MMHG

## 2021-09-13 LAB
BACTERIA SPT CULT: NORMAL
ERYTHROCYTE [DISTWIDTH] IN BLOOD BY AUTOMATED COUNT: 13.9 % (ref 10–15)
GRAM STAIN RESULT: NORMAL
HCT VFR BLD AUTO: 39.9 % (ref 35–47)
HGB BLD-MCNC: 12.8 G/DL (ref 11.7–15.7)
MCH RBC QN AUTO: 28.5 PG (ref 26.5–33)
MCHC RBC AUTO-ENTMCNC: 32.1 G/DL (ref 31.5–36.5)
MCV RBC AUTO: 89 FL (ref 78–100)
PLATELET # BLD AUTO: 280 10E3/UL (ref 150–450)
RBC # BLD AUTO: 4.49 10E6/UL (ref 3.8–5.2)
WBC # BLD AUTO: 10.1 10E3/UL (ref 4–11)

## 2021-09-13 PROCEDURE — 999N000157 HC STATISTIC RCP TIME EA 10 MIN

## 2021-09-13 PROCEDURE — 85027 COMPLETE CBC AUTOMATED: CPT | Performed by: INTERNAL MEDICINE

## 2021-09-13 PROCEDURE — 94640 AIRWAY INHALATION TREATMENT: CPT | Mod: 76

## 2021-09-13 PROCEDURE — 99254 IP/OBS CNSLTJ NEW/EST MOD 60: CPT | Performed by: INTERNAL MEDICINE

## 2021-09-13 PROCEDURE — 250N000013 HC RX MED GY IP 250 OP 250 PS 637: Performed by: INTERNAL MEDICINE

## 2021-09-13 PROCEDURE — 250N000011 HC RX IP 250 OP 636: Performed by: INTERNAL MEDICINE

## 2021-09-13 PROCEDURE — 94640 AIRWAY INHALATION TREATMENT: CPT

## 2021-09-13 PROCEDURE — 93971 EXTREMITY STUDY: CPT | Mod: RT

## 2021-09-13 PROCEDURE — 36415 COLL VENOUS BLD VENIPUNCTURE: CPT | Performed by: INTERNAL MEDICINE

## 2021-09-13 PROCEDURE — 99239 HOSP IP/OBS DSCHRG MGMT >30: CPT | Performed by: INTERNAL MEDICINE

## 2021-09-13 RX ORDER — DOXYCYCLINE 100 MG/1
100 CAPSULE ORAL EVERY 12 HOURS
Qty: 8 CAPSULE | Refills: 0 | Status: SHIPPED | OUTPATIENT
Start: 2021-09-13 | End: 2021-09-17

## 2021-09-13 RX ADMIN — LEVOTHYROXINE SODIUM 100 MCG: 0.1 TABLET ORAL at 06:22

## 2021-09-13 RX ADMIN — DOXYCYCLINE HYCLATE 100 MG: 100 CAPSULE ORAL at 07:54

## 2021-09-13 RX ADMIN — ALBUTEROL SULFATE 2 PUFF: 90 AEROSOL, METERED RESPIRATORY (INHALATION) at 16:18

## 2021-09-13 RX ADMIN — CEFTRIAXONE 2 G: 2 INJECTION, POWDER, FOR SOLUTION INTRAMUSCULAR; INTRAVENOUS at 16:48

## 2021-09-13 RX ADMIN — ALBUTEROL SULFATE 2 PUFF: 90 AEROSOL, METERED RESPIRATORY (INHALATION) at 11:41

## 2021-09-13 RX ADMIN — FLUTICASONE FUROATE AND VILANTEROL TRIFENATATE 1 PUFF: 100; 25 POWDER RESPIRATORY (INHALATION) at 07:28

## 2021-09-13 RX ADMIN — ALBUTEROL SULFATE 2 PUFF: 90 AEROSOL, METERED RESPIRATORY (INHALATION) at 07:28

## 2021-09-13 ASSESSMENT — ACTIVITIES OF DAILY LIVING (ADL)
ADLS_ACUITY_SCORE: 14

## 2021-09-13 ASSESSMENT — ENCOUNTER SYMPTOMS
GASTROINTESTINAL NEGATIVE: 1
HEMOPTYSIS: 1
CONSTITUTIONAL NEGATIVE: 1
CARDIOVASCULAR NEGATIVE: 1
EYES NEGATIVE: 1
MUSCULOSKELETAL NEGATIVE: 1

## 2021-09-13 NOTE — PLAN OF CARE
End of Shift Summary  For vital signs and complete assessments, please see documentation flowsheets.     Pertinent assessments: Pt up independent in room. Tolerating regular diet. O2 sats stable on RA. Lung sounds slightly diminished on the right. Infiltrate and redness R AC, remains red and warm to the touch, ice pack offered, pt declined. Pt slept majority of shift.      Treatment Plan: Pulmonary to see on 9/13. Monitor R AC site. Antibiotics.      Bedside Nurse: Leanna Mackenzie RN

## 2021-09-13 NOTE — CONSULTS
"                                                         Pulmonary Consult  Irene Haile MRN: [id number]  1955  Date of Admission:9/10/2021  Primary care provider: Leonardo Angela  ___________________________________    Irene Haile MRN# [id number]   YOB: 1955 Age: 65 year old   Date of Admission: 9/10/2021     Reason for consult: I was asked by Dr. Sherman to evaluate this patient for \" hemoptysis, lung infiltrates\".          Assessment and Recommendations:     ## Hemoptysis  ## Community-acquired pneumonia   ## COPD  ## Tobacco use disorder  Asymptomatic except for blood present in her chronic cough.  She had a similar episode back in 2013 with 1 day of hemoptysis.  Imaging at that time revealed an infiltrate, she was treated with antibiotics as an outpatient, and repeat imaging showed resolution.  She was seen by outpatient pulmonology.  Bronchoscopy was not thought necessary at that time.  She has an approximately 45-pack-year smoking history, carries a diagnosis of COPD, and is being treated with Advair but has never had PFTs.  Her chest CT reveals cysts, likely consistent with emphysema though a cystic lung syndrome such as Ieol-Gjuj-Moys cannot be ruled out.  She experienced some joint pain back in June and was found to have a positive OCTAVIANO and a normal rheumatoid factor at that time.  ANCA currently pending.    The hemoptysis is likely related to irritation from coughing and bronchiectasis.  Given that it has resolved she is appropriate for discharge from a pulmonary perspective.     - Follow up with outpatient pulmonary for assessment of COPD/bronchiectasis/possible cystic lung disease      - Should have PFT's and be evaluated for ILD or potential cystic syndrome      -Likely also require repeat imaging to ensure groundglass areas have resolved  -Continue PTA Advair  -Encouraged smoking cessation      Octavio Pinzon M.D.  Pulmonary & Critical Care  Pager: Click Here to " page    Pulmonary will continue to follow. We are in house at [facility] on Monday, Wednesday, and Friday. For assistance on other days, please page the on-call pulmonologist through [facility] or the .             HPI:     Irene Haile is a 65 year old female with HO COPD and prior hemoptysis, ILD, bronchiectasis, hypothyroidism, HLD, ovarian cancer with resection 20 years ago admitted 9/10/2021 for hemoptysis.    She feels that she is in her normal state of health, however she has a chronic cough and noticed bright red blood when she coughed on 9/10.  She estimates approximately half a cup of blood.  She denies oropharyngeal bleeding/epistaxis.  She has no chest pain.  She denies any infectious symptoms.    She had a another episode of hemoptysis in 2013.  At that time a chest CT revealed an infiltrate.  She was seen in outpatient pulmonology clinic, treated with antibiotics, and repeat CT scan showed resolution.             Past Medical History:     Past Medical History:   Diagnosis Date     Axillary adenopathy 6/5/2013     COPD (chronic obstructive pulmonary disease) (H) 6/5/2013     Hypothyroidism, unspecified type 12/30/2017     Personal history of tobacco use, presenting hazards to health      Pulmonary hemorrhage 6/5/2013     UTI (urinary tract infection)              Past Surgical History:      Past Surgical History:   Procedure Laterality Date     C TOTAL ABDOM HYSTERECTOMY  2002     BSO     ZZC NONSPECIFIC PROCEDURE   June 2010    Laparoscopic appendectomy             Social History:     Social History     Socioeconomic History     Marital status:      Spouse name: Larry     Number of children: 2     Years of education: 15     Highest education level: Not on file   Occupational History     Occupation: Clerical     Employer: [employer]    Tobacco Use     Smoking status: Current Every Day Smoker     Packs/day: 0.50     Years: 20.00     Pack years: 10.00     Types: Cigarettes     Smokeless  tobacco: Never Used     Tobacco comment:     Substance and Sexual Activity     Alcohol use: No     Drug use: No     Sexual activity: Yes     Partners: Male     Birth control/protection: Surgical     Comment: hysterectomy-   Other Topics Concern     Parent/sibling w/ CABG, MI or angioplasty before 65F 55M? Not Asked   Social History Narrative     Not on file     Social Determinants of Health     Financial Resource Strain:      Difficulty of Paying Living Expenses:    Food Insecurity:      Worried About Running Out of Food in the Last Year:      Ran Out of Food in the Last Year:    Transportation Needs:      Lack of Transportation (Medical):      Lack of Transportation (Non-Medical):    Physical Activity:      Days of Exercise per Week:      Minutes of Exercise per Session:    Stress:      Feeling of Stress :    Social Connections:      Frequency of Communication with Friends and Family:      Frequency of Social Gatherings with Friends and Family:      Attends Baptism Services:      Active Member of Clubs or Organizations:      Attends Club or Organization Meetings:      Marital Status:    Intimate Partner Violence:      Fear of Current or Ex-Partner:      Emotionally Abused:      Physically Abused:      Sexually Abused:               Family History:     Family History   Problem Relation Age of Onset     Diabetes Brother         Diag. age 41     Breast Cancer Mother      Cancer Mother         All over her body     Alcohol/Drug Father         Has Liver Problems     Cancer - colorectal Brother         before age 50     Cancer Brother         liver cancer      Family History Negative Brother         ulcerative colitis     Family History Negative Sister      Sjogren's Daughter              Immunizations:     Immunization History   Administered Date(s) Administered     COVID-19,PF,Pfizer 2021, 2021     Flu, Unspecified 10/06/2020     Influenza Vaccine IM > 6 months Valent IIV4 (Alfuria,Fluzone)  10/08/2014, 10/14/2015, 10/03/2017     TD (ADULT, 7+) 01/01/2004     TDAP Vaccine (Adacel) 03/08/2018     Zoster vaccine recombinant adjuvanted (SHINGRIX) 06/08/2021             Allergies:     Allergies   Allergen Reactions     Augmented Betamethasone Diprop [Betamethasone] GI Disturbance     Codeine      Insomnia       Nitrofurantoin      Nausea, loss appetite     Shingrix [Zoster Vac Recomb Adjuvanted]      Severe body pain for 3-4 weeks               Medications:     Current Facility-Administered Medications   Medication     albuterol (PROAIR HFA/PROVENTIL HFA/VENTOLIN HFA) 108 (90 Base) MCG/ACT inhaler 2 puff     albuterol (PROAIR HFA/PROVENTIL HFA/VENTOLIN HFA) 108 (90 Base) MCG/ACT inhaler 2 puff     cefTRIAXone (ROCEPHIN) 2 g vial to attach to  ml bag for ADULTS or NS 50 ml bag for PEDS     doxycycline hyclate (VIBRAMYCIN) capsule 100 mg     fluticasone-vilanterol (BREO ELLIPTA) 100-25 MCG/INH inhaler 1 puff     levothyroxine (SYNTHROID/LEVOTHROID) tablet 100 mcg     lidocaine (LMX4) cream     lidocaine 1 % 0.1-1 mL     melatonin tablet 1 mg     nicotine (NICORETTE) gum 2 mg     ondansetron (ZOFRAN-ODT) ODT tab 4 mg    Or     ondansetron (ZOFRAN) injection 4 mg     sodium chloride (PF) 0.9% PF flush 3 mL     sodium chloride (PF) 0.9% PF flush 3 mL              Review of Systems:     Review of Systems   Constitutional: Negative.    HENT: Negative.    Eyes: Negative.    Respiratory: Positive for hemoptysis.         Cough unchanged though hemoptysis is now   Cardiovascular: Negative.    Gastrointestinal: Negative.    Genitourinary: Negative.    Musculoskeletal: Negative.    Skin: Negative.    All other systems reviewed and are negative.             Exam:   /61 (BP Location: Left arm)   Pulse 77   Temp 98.1  F (36.7  C) (Oral)   Resp 18   Wt 67.8 kg (149 lb 8 oz)   SpO2 97%   BMI 22.73 kg/m      Vitals:    09/10/21 1204 09/10/21 1806   Weight: 68 kg (150 lb) 67.8 kg (149 lb 8 oz)          Physical Exam  Vitals and nursing note reviewed.   Constitutional:       Appearance: Normal appearance. She is normal weight. She is not ill-appearing.   HENT:      Head: Normocephalic and atraumatic.      Right Ear: External ear normal.      Left Ear: External ear normal.      Nose: Nose normal.      Mouth/Throat:      Mouth: Mucous membranes are moist.      Pharynx: Oropharynx is clear.   Eyes:      Extraocular Movements: Extraocular movements intact.      Conjunctiva/sclera: Conjunctivae normal.      Pupils: Pupils are equal, round, and reactive to light.   Cardiovascular:      Rate and Rhythm: Normal rate and regular rhythm.   Pulmonary:      Effort: Pulmonary effort is normal.      Breath sounds: Normal breath sounds.   Abdominal:      General: Abdomen is flat. Bowel sounds are normal.      Palpations: Abdomen is soft.   Musculoskeletal:         General: Normal range of motion.   Skin:     General: Skin is warm and dry.   Neurological:      General: No focal deficit present.      Mental Status: She is alert and oriented to person, place, and time.                Data:   ROUTINE ICU LABS (Last four results)  CMP  Recent Labs   Lab 09/10/21  1321      POTASSIUM 4.0   CHLORIDE 107   CO2 27   ANIONGAP 4   GLC 96   BUN 12   CR 0.69   GFRESTIMATED >90   JOVANI 9.5     CBC  Recent Labs   Lab 09/13/21  0724 09/11/21  0647 09/10/21  1321   WBC 10.1 8.5 10.7   RBC 4.49 4.49 5.13   HGB 12.8 13.0 15.2   HCT 39.9 40.3 47.3*   MCV 89 90 92   MCH 28.5 29.0 29.6   MCHC 32.1 32.3 32.1   RDW 13.9 14.0 14.0    278 312     INFLAMMATION  Recent Labs   Lab 09/11/21  1012   CRP 16.3*       INRNo lab results found in last 7 days.  Arterial Blood GasNo lab results found in last 7 days.    ALL CULTURESNo results for input(s): CULT in the last 168 hours.           Imaging:     CT Chest  :   1.  Interstitial lung disease appears new compared to the prior study  and is associated with subpleural cystic change and  traction  bronchiectasis.  2.  Areas of mucous plugging in bronchiectatic lower lobe airway  suggestive of a superimposed infectious process. These are associated  with patchy groundglass opacities.  3.  Unilateral left axillary lymphadenopathy. This could be associated  with vaccination or inflammatory process in the left arm.  4.  Periportal lymphadenopathy incompletely evaluated on this study of  the chest only. There are also abnormalities in what appears to be an  enlarged body of the pancreas. Follow-up pancreatic MRI recommended.  5.  No evidence of pulmonary embolism.        The above note was dictated using voice recognition software and may include typographical errors. Please contact the author for any clarifications.

## 2021-09-13 NOTE — PROGRESS NOTES
Pt to D/C to home with .  Pt provided with d/c instructions, including new medications, when medications were last given, and when to take them again.  Pt also informed to f/u with pulmonologist.  Pt verbalized understanding of all d/c and f/u instructions.  All questions were answered at this time.  Copy of paperwork sent with pt.  Medication/Scripts x2 sent with pt.   to provide transport.  All personal belongings sent with pt.     Doris Melendez RN

## 2021-09-13 NOTE — PLAN OF CARE
Pt A&O x4.  VSS on RA.  Denies pain.  Up independently in room. Tolerating regular diet.  Lung sounds slightly diminished on the right. Infiltrate and redness noted R AC; US today.  Abx infused per order.    Treatment Plan: Pulmonary consult.  Monitor R AC site. Antibiotics.      Doris Melendez RN

## 2021-09-13 NOTE — PLAN OF CARE
End of Shift Summary  For vital signs and complete assessments, please see documentation flowsheets.     Pertinent assessments: Pt up independent in room. Tolerating regular diet, good appetite for dinner. Afebrile. Maintaining sats on room air. Lung sounds slightly diminished on the right. Infiltrate and redness noted R AC IV at end of Rocephin infusion. New IV started prior to going down for MRI. Ice applied for comfort to infiltrate.   Treatment Plan: Monitor R AC site. Antibiotics.

## 2021-09-13 NOTE — CONSULTS
Care Management Note    Discharge Date: 09/14/2021     Additional Information:  Pt identified as a high risk due to COPD hx of suspected PNA. No needs or assessment needed at this time. Please consult CM/SW  if discharge needs should arise. Will follow along.    Roxana López RN BSN   Inpatient Care Coordination  [facility]  [phone]    Tia López, RN

## 2021-09-13 NOTE — DISCHARGE SUMMARY
[facility]  Hospitalist Discharge Summary      Date of Admission:  9/10/2021  Date of Discharge:  9/13/2021  Discharging Provider: Shaun Hinton DO      Discharge Diagnoses   1.  Hemoptysis.  2.  Community-acquired pneumonia.  3.  Hypothyroidism.  4.  Pancreatic cysts.  5.  Tobacco use disorder.  6.  COPD.  7.  Right upper extremity superficial venous thromboembolism.    Follow-ups Needed After Discharge   Follow-up Appointments     Follow-up and recommended labs and tests       Follow up with primary care provider, Leonardo Angela, within 7 days for   hospital follow- up.  The following labs/tests are recommended: CBC within   7 days.  Follow-up with pulmonology within the next 4 weeks.  Does need   repeat MRI of the abdomen to evaluate pancreatic cysts in approximately 6   months.  Will need to be set up by primary care provider.             Discharge Disposition   Discharged to home  Condition at discharge: Stable      Hospital Course   Irene Haile is a 65 year old female admitted on 9/10/2021. She has a history of COPD and previous episode of hemoptysis 8 years ago and presents with hemoptysis. She is otherwise asymptomatic and chest CT reveals interstitial lung disease, bronchiectasis, mucous plugging and groundglass opacities. Past medical history also significant for hypothyroidism, dyslipidemia, ovarian cancer with resection 20 years ago and right axillary lymph node biopsy in 2013 which showed no evidence of malignancy.  She was started on antibiotics with doxycycline and IV ceftriaxone.  Continue antibiotics with doxycycline 100 g twice a day and 875/125 mg twice a day for additional 4 days to complete 7-day course.  Hemoglobin was monitored during hospital stay.  Did have occasional continued hemoptysis earlier in hospital stay.  By time of discharge, hemoptysis has resolved.  She was seen in consultation by pulmonology during hospital stay.  It is recommended that she  continue to follow-up with pulmonology in the outpatient setting.  She did have incidentally noted area of enlargement of her pancreas on initial imaging.  This was further evaluated during hospitalization with MRI of the abdomen.  MRI shows 2 cystic appearing structures in the pancreas.  It is recommended by radiology that  she have repeat MRI of the abdomen in 6 months to reevaluate this area.  On 9/13, she did develop some mild swelling at site of previous IV in her right upper extremity.  Ultrasound of this area does show a superficial venous thromboembolism.  Should apply warm compresses to the area to help with resolution of superficial venous thrombosis.  Follow-up with primary care provider in approximately 1 week.  Follow-up with pulmonology soon as appointment can be made in outpatient setting.  Ideally, within the next few weeks.  Needs repeat CBC in 7 days.       Consultations This Hospital Stay   PULMONARY IP CONSULT  CARE MANAGEMENT / SOCIAL WORK IP CONSULT    Code Status   Full Code    Time Spent on this Encounter   I spent 35 minutes with Ms. Haile and working on discharge on 9/13/2021.       [facility] [place] [address] [facility]  [address] [place] [address]  Phone: [phone]  Fax: [phone]  ______________________________________________________________________    Physical Exam   Vital Signs: Temp: 98.6  F (37  C) Temp src: Oral BP: 119/64 Pulse: 88   Resp: 16 SpO2: 95 % O2 Device: None (Room air)    Weight: 149 lbs 8 oz  Gen:  NAD, A&Ox3.  Eyes:  PERRL, sclera anicteric.  OP:  MMM, no lesions.  Neck:  Supple.  CV:  Regular, no murmurs.  Lung:  CTA b/l, normal effort.  Ab:  +BS, soft.  Skin:  Warm, dry to touch.  No rash.  Ext:  No pitting edema LE b/l.  Small area of swelling in right upper extremity.         Primary Care Physician   Leonardo Angela    Discharge Orders      CBC with platelets     Care Coordination Referral      Reason for your  hospital stay    Pneumonia, hemoptysis.     Follow-up and recommended labs and tests     Follow up with primary care provider, Leonardo Angela, within 7 days for hospital follow- up.  The following labs/tests are recommended: CBC within 7 days.  Follow-up with pulmonology within the next 4 weeks.  Does need repeat MRI of the abdomen to evaluate pancreatic cysts in approximately 6 months.  Will need to be set up by primary care provider.     Activity    Your activity upon discharge: activity as tolerated     Diet    Follow this diet upon discharge: Regular       Discharge Medications   Current Discharge Medication List      START taking these medications    Details   amoxicillin-clavulanate (AUGMENTIN) 875-125 MG tablet Take 1 tablet by mouth 2 times daily for 4 days  Qty: 8 tablet, Refills: 0    Associated Diagnoses: Hemoptysis      doxycycline hyclate (VIBRAMYCIN) 100 MG capsule Take 1 capsule (100 mg) by mouth every 12 hours for 4 days  Qty: 8 capsule, Refills: 0    Associated Diagnoses: Hemoptysis         CONTINUE these medications which have NOT CHANGED    Details   fluticasone-salmeterol (ADVAIR-HFA) 115-21 MCG/ACT inhaler Inhale 2 puffs into the lungs 2 times daily  Qty: 12 g, Refills: 11    Associated Diagnoses: Chronic obstructive pulmonary disease, unspecified COPD type (H)      levothyroxine (SYNTHROID/LEVOTHROID) 100 MCG tablet TAKE 1 TABLET(100 MCG) BY MOUTH DAILY  Qty: 90 tablet, Refills: 3    Comments: Profile only. Pt doesn't require refill at this time  Associated Diagnoses: Hypothyroidism, unspecified type           Allergies   Allergies   Allergen Reactions     Augmented Betamethasone Diprop [Betamethasone] GI Disturbance     Codeine      Insomnia       Nitrofurantoin      Nausea, loss appetite     Shingrix [Zoster Vac Recomb Adjuvanted]      Severe body pain for 3-4 weeks

## 2021-09-14 ENCOUNTER — PATIENT OUTREACH (OUTPATIENT)
Dept: NURSING | Facility: CLINIC | Age: 66
End: 2021-09-14
Payer: COMMERCIAL

## 2021-09-14 ENCOUNTER — TELEPHONE (OUTPATIENT)
Dept: CARE COORDINATION | Facility: CLINIC | Age: 66
End: 2021-09-14

## 2021-09-14 LAB
ANCA AB PATTERN SER IF-IMP: NORMAL
C-ANCA TITR SER IF: NORMAL {TITER}

## 2021-09-14 ASSESSMENT — ACTIVITIES OF DAILY LIVING (ADL): DEPENDENT_IADLS:: INDEPENDENT

## 2021-09-14 NOTE — LETTER
[facility] CARE COORDINATION  [address] [facility] [address]    September 14, 2021    Irene OZUNA Mic  [address]  [facility] [address]      Dear [place],    I am a clinic care coordinator who works with Leonardo Angela MD at [facility]. I wanted to thank you for spending the time to talk with me.  Below is a description of clinic care coordination and how I can further assist you.      The clinic care coordination team is made up of a registered nurse,  and community health worker who understand the health care system. The goal of clinic care coordination is to help you manage your health and improve access to the health care system in the most efficient manner. The team can assist you in meeting your health care goals by providing education, coordinating services, strengthening the communication among your providers and supporting you with any resource needs.    Please feel free to contact me with any questions or concerns. We are focused on providing you with the highest-quality healthcare experience possible and that all starts with you.     Sincerely,     [facility]   Priya Wheat RN, Care Coordinator   [facility], [facility]  E-mail [email or web address]   [phone]      Enclosed: I have enclosed a copy of the Patient Centered Plan of Care. This has helpful information and goals that we have talked about. Please keep this in an easy to access place to use as needed.

## 2021-09-14 NOTE — LETTER
[address] [facility] [address]      [facility]  Patient Centered Plan of Care  About Me:        Patient Name:  Irene Haile    YOB: 1955  Age:         65 year old   [place] MRN:    [id number] Telephone Information:  Home Phone [phone]   Mobile [phone]       Address:  [address] [place] [address] Email address:  [email or web address]      Emergency Contact(s)    Name Relationship Lgl Grd Work Phone Home Phone Mobile Phone   SRINATH ESCOBEDO Spouse  [phone]           Primary language:  English     needed? No   [facility] [employer]:  [phone] op. 1  Other communication barriers: None  Preferred Method of Communication:  Phone  Current living arrangement: I live in a private home with spouse  Mobility Status/ Medical Equipment: Independent    Health Maintenance  Health Maintenance Reviewed: Due/Overdue   Health Maintenance Topics with due status: Overdue       Topic Date Due    SPIROMETRY Never done    COPD ACTION PLAN Never done    Pneumococcal Vaccine: Pediatrics (0 to 5 Years) and At-Risk Patients (6 to 64 Years) Never done    HIV SCREENING Never done    PREVENTIVE CARE VISIT 10/23/2009    ADVANCE CARE PLANNING 05/31/2018    PAP 05/30/2019    INFLUENZA VACCINE 09/01/2021     Health Maintenance Topics with due status: Due On       Topic Date Due    ZOSTER IMMUNIZATION 08/03/2021    MAMMO SCREENING 08/21/2021         My Access Plan  Medical Emergency 911   Primary Clinic Line [facility] [phone]   24 Hour Appointment Line [phone] or  [phone]) (toll-free)   24 Hour Nurse Line [phone] (toll-free)   Preferred Urgent Care [facility], [phone]   [facility]  [phone]   Preferred Pharmacy [id number] [place] - [address] [place] [address] & [address]      Behavioral Health Crisis Line The National Suicide Prevention Lifeline at 1-123.155.7909 or 911             My Care Team Members  Patient Care Team       Relationship Specialty Notifications Start End    Leonardo Angela MD PCP - General Internal Medicine  7/17/10     Phone: [phone] Fax: [phone]         [address] [place] [address]    Leonardo Angela MD Assigned PCP   2/14/21     Phone: [phone] Fax: [phone] [address] [place] [address]    Priya Wheat, RN Lead Care Coordinator  Admissions 9/14/21             My Care Plans  Self Management and Treatment Plan  Goals and (Comments)  Goals        General     1. Medical (pt-stated)      Notes - Note edited  9/14/2021  1:44 PM by Priya Wheat, RN     Goal Statement: I would like assistance and support in maintaining my health and wellness to prevent hospital readmission/ED visit.  Date Goal set: 9/14/2021  Barriers: Discharged last night; 9/13/2021  Strengths: Strong advocate for self  Date to Achieve By: 3/2022  Patient expressed understanding of goal: Yes  Action steps to achieve this goal:  1. I would like assistance and support in maintaining my health and wellness to prevent hospital readmission/ED visit  2. I will follow up with my providers as scheduled/recommended   3. I will take my medications as prescribed  4.   I will discuss, review, schedule and complete recommended overdue health        maintenance with my primary care provider  5.   I will contact my care team with questions, concerns, support needs. I will use the clinic as a resource and I understand I can contact my clinic with 24/7 after hours services available. Care Coordinator will remain available as needed.             Action Plans on File:        Advance Care Plans/Directives Type:        My Medical and Care Information  Problem List   Patient Active Problem List   Diagnosis     Tobacco use disorder     ACP (advance care planning)     COPD (chronic  obstructive pulmonary disease) (H)     Hypothyroidism, unspecified type     Hyperlipidemia LDL goal <100      Current Medications and Allergies:    Allergies   Allergen Reactions     Augmented Betamethasone Diprop [Betamethasone] GI Disturbance     Codeine      Insomnia       Nitrofurantoin      Nausea, loss appetite     Shingrix [Zoster Vac Recomb Adjuvanted]      Severe body pain for 3-4 weeks     Current Outpatient Medications   Medication     doxycycline hyclate (VIBRAMYCIN) 100 MG capsule     fluticasone-salmeterol (ADVAIR-HFA) 115-21 MCG/ACT inhaler     levothyroxine (SYNTHROID/LEVOTHROID) 100 MCG tablet     amoxicillin-clavulanate (AUGMENTIN) 875-125 MG tablet     No current facility-administered medications for this visit.           Care Coordination Start Date: 9/14/2021   Frequency of Care Coordination: weekly   Form Last Updated: 09/14/2021       [facility]   Priya Wheat RN, Care Coordinator   [facility] [place], Women's Clinic, [facility], [place]  E-mail [email or web address]   [phone]

## 2021-09-14 NOTE — PROGRESS NOTES
"Clinic Care Coordination Contact  [facility]: Post-Discharge Note  SITUATION                                                      Admission:    Admission Date: 09/10/21   Reason for Admission: Hemoptysis; Pneumonia  Discharge:   Discharge Date: 09/13/21  Discharge Diagnosis: Community Acquired Pneumonia; Hemoptysis    BACKGROUND                                                      Patient was admitted to [facility] 9/10/2021-9/13/2021 for hemoptysis and pneumonia.    ASSESSMENT      Enrollment  Primary Care Care Coordination Status: Enrolled    Discharge Assessment  How are you doing now that you are home?: \"Well... I think I'm allergic to Augmentin and didn't they prescribe this?  I was up all night throwing up and I had diarrhea this morning>\" (RNCC will reach out to  [facility] to apprise)  How are your symptoms? (Red Flag symptoms escalate to triage hotline per guidelines): Unchanged  Do you feel your condition is stable enough to be safe at home until your provider visit?: Yes  Does the patient have their discharge instructions? : Yes  Does the patient have questions regarding their discharge instructions? : No  Were you started on any new medications or were there changes to any of your previous medications? : Yes  Does the patient have all of their medications?: Yes (\"I will not take Augmentin anymore.\")  Do you have questions regarding any of your medications? : Yes (see comment) (\"I'm allergic to Augmentin and I need something different\")  Do you have all of your needed medical supplies or equipment (DME)?  (i.e. oxygen tank, CPAP, cane, etc.):  (NA)  Discharge follow-up appointment scheduled within 14 calendar days? : No  Is patient agreeable to assistance with scheduling? : Yes (Patient will MyChart Dr. Leonardo Angela to set up appt with labs-CBC)  Post-op (Clinicians Only)  Did the patient have surgery or a procedure: No  Fever: No  Chills: No  Eating & Drinking: eating and " "drinking without complaints/concerns  PO Intake: regular diet  Additional Symptoms: vomiting (Due to Augmentin)  # of Emesis: 4  Frequency: \"All night\"  Bowel Function: loose stools    Care Management       Care Mgmt General Assessment  Referral  Referral Source: IP Report  Health Care Home/Utilization  Preferred Hospital: [facility]  [phone]  Preferred Urgent Care: [facility], [phone]  Living Situation  Current living arrangement:: I live in a private home with spouse  Type of residence:: Private home - stairs  Resources  Patient receiving home care services:: No  Community Resources: None  Supplies used at home:: None  Equipment Currently Used at Home: none  Referrals Placed: None  Employment Status: employed full-time  Psychosocial  Presybeterian or spiritual beliefs that impact treatment:: No  Mental health DX:: No  Mental health management concern (GOAL):: No  Chemical Dependency Status: No Current Concerns  Chemical Dependency Management:  (NA)  Informal Support system:: Spouse  Functional Status  Dependent ADLs:: Independent  Dependent IADLs:: Independent  Bed or wheelchair confined:: No  Mobility Status: Independent  Fallen 2 or more times in the past year?: No  Any fall with injury in the past year?: No  Advance Care Plan/Directive  Advanced Care Plans/Directives on file:: No  Advanced Care Plan/Directive Status: Not Applicable and     Care Mgmt Encounter Assessment  Due/overdue:  There are no preventive care reminders to display for this patient.    Clinic Utilization  Difficulty keeping appointments:: No  Compliance Concerns: No  No-Show Concerns: No  No PCP office visit in Past Year: Yes (Needs to establish PCP and requests Dr. Leonardo Angela)  Transportation  Transportation means:: Regular car     Primary Diagnosis  Primary Diagnosis: Pneumonia  Barriers in Communication  Other concerns:: None  How confident are you filling out medical forms by yourself:: " "Not Assessed  Pain  Pain (GOAL):: No  Medication Review  Medication adherence problem (GOAL):: No  Knowledgeable about how to use meds:: Yes  Medication side effects suspected:: Yes (Augmentin:  She is allergic)  Diet/Exercise/Sleep  Diet:: Regular  Inadequate nutrition (GOAL):: No  Tube Feeding: No  Inadequate activity/exercise (GOAL):: No  Significant changes in sleep pattern (GOAL): No (Had a \"tough\" night the first night home with allergic rxn to Augmentin)    PLAN                                                      Outpatient Plan:    Goals        1. Medical (pt-stated)       Goal Statement: I would like assistance and support in maintaining my health and wellness to prevent hospital readmission/ED visit.  Date Goal set: 9/14/2021  Barriers: Discharged last night; 9/13/2021  Strengths: Strong advocate for self  Date to Achieve By: 3/2022  Patient expressed understanding of goal: Yes  Action steps to achieve this goal:  1. I would like assistance and support in maintaining my health and wellness to prevent hospital readmission/ED visit  2. I will follow up with my providers as scheduled/recommended   3. I will take my medications as prescribed  4.   I will discuss, review, schedule and complete recommended overdue health        maintenance with my primary care provider  5.   I will contact my care team with questions, concerns, support needs. I will use the clinic as a resource and I understand I can contact my clinic with 24/7 after hours services available. Care Coordinator will remain available as needed.                    For any urgent concerns, please contact our 24 hour nurse triage line: [phone] ([phone])       [facility]   Priya Wheat RN, Care Coordinator   [facility], Women[facility] Clinic, [facility], [place]  E-mail [email or web address]   [phone]                          "

## 2021-09-14 NOTE — TELEPHONE ENCOUNTER
"Hi!    I enrolled Myrna into care coordination and suggested she MyChart the [facility] clinic requesting a 7 day hospital follow up appt and to apprise that she stopped taking the Augmentin due to \"I'm allergic\".  I instructed her that I would reach out as well to see if she should be on another abx (also taking Vibramycin BID).    I am new and if I sent this to the incorrect pool, would you please accept my apology and forward to the correct one?  "

## 2021-09-15 LAB
BACTERIA BLD CULT: NO GROWTH
BACTERIA BLD CULT: NO GROWTH

## 2021-09-15 NOTE — TELEPHONE ENCOUNTER
Dr. Angela,     She's had an allergic reaction in past to Augmentin, at the ER the nurse had her took 1 tablet of the Augmentin . She reports vomiting/reguration ( she wants to make sure this is reflected on her chart) and she has not been taking this antibiotics, but she has been taking the doxycyline-     Please add allergy to her list.     Also, first available for you 10/4/21- do you want to see her sooner? It's for hospital f/u. Per hospital note, patient needs referral to pulmonigist, I don't see a referral, maybe I'm missing it.     Please advise.       Can we leave a detailed message on this number? YES  Phone number patient can be reached at: Cell number on file:    Telephone Information:   Mobile [phone]       Agnes Worrell RN  [facility] Triage

## 2021-09-22 ENCOUNTER — PATIENT OUTREACH (OUTPATIENT)
Dept: CARE COORDINATION | Facility: CLINIC | Age: 66
End: 2021-09-22

## 2021-09-22 ENCOUNTER — APPOINTMENT (OUTPATIENT)
Dept: NURSING | Facility: CLINIC | Age: 66
End: 2021-09-22
Payer: COMMERCIAL

## 2021-09-22 NOTE — PROGRESS NOTES
Clinic Care Coordination Contact    Follow Up Progress Note      Assessment:  RNCC reached out to patient regarding her 7-10 day hospital follow up appointment.  Per patient, she sent a message via [employer] requesting an appointment and has not heard back.    RNCC called [facility] and spoke with Jessica, , and Dr. Anglea is booked.  Per patient request, she does not want to see a NP or PA.  Jessica, , sent another message to pool and will await response.      Care Gaps:    Health Maintenance Due   Topic Date Due     SPIROMETRY  Never done     COPD ACTION PLAN  Never done     Pneumococcal Vaccine: Pediatrics (0 to 5 Years) and At-Risk Patients (6 to 64 Years) (1 of 2 - PPSV23) Never done     HIV SCREENING  Never done     PREVENTIVE CARE VISIT  10/23/2009     ADVANCE CARE PLANNING  05/31/2018     PAP  05/30/2019     INFLUENZA VACCINE (1) 09/01/2021     ZOSTER IMMUNIZATION (2 of 2) 08/03/2021     MAMMO SCREENING  08/21/2021       There are no preventive care reminders to display for this patient.         Goals addressed this encounter:   Goals Addressed                    This Visit's Progress       1. Medical (pt-stated)   10%      Goal Statement: I would like assistance and support in maintaining my health and wellness to prevent hospital readmission/ED visit.  Date Goal set: 9/14/2021  Barriers: Discharged last night; 9/13/2021  Strengths: Strong advocate for self  Date to Achieve By: 3/2022  Patient expressed understanding of goal: Yes  Action steps to achieve this goal:  1. I would like assistance and support in maintaining my health and wellness to prevent hospital readmission/ED visit  2. I will follow up with my providers as scheduled/recommended   3. I will take my medications as prescribed  4.   I will discuss, review, schedule and complete recommended overdue health        maintenance with my primary care provider  5.   I will contact my care team with questions, concerns, support needs. I  will use the clinic as a resource and I understand I can contact my clinic with 24/7 after hours services available. Care Coordinator will remain available as needed.            Intervention/Education provided during outreach: NA     Outreach Frequency: weekly    Plan:   RNCC will reach out to [facility] in the am to check for any cancellations this week and schedule patient for her hospital follow up appointment.    Care Coordinator will follow up in 1-2 business days.    Care Gaps:     Health Maintenance Due   Topic Date Due     SPIROMETRY  Never done     COPD ACTION PLAN  Never done     Pneumococcal Vaccine: Pediatrics (0 to 5 Years) and At-Risk Patients (6 to 64 Years) (1 of 2 - PPSV23) Never done     HIV SCREENING  Never done     PREVENTIVE CARE VISIT  10/23/2009     ADVANCE CARE PLANNING  05/31/2018     PAP  05/30/2019     INFLUENZA VACCINE (1) 09/01/2021     ZOSTER IMMUNIZATION (2 of 2) 08/03/2021     MAMMO SCREENING  08/21/2021     There are no preventive care reminders to display for this patient.      Goals:   Goals Addressed as of 9/22/2021 at 4:23 PM                    Today    9/14/21       1. Medical (pt-stated)   10%  0%    Added 9/14/21 by Priya Wheat, RN      Goal Statement: I would like assistance and support in maintaining my health and wellness to prevent hospital readmission/ED visit.  Date Goal set: 9/14/2021  Barriers: Discharged last night; 9/13/2021  Strengths: Strong advocate for self  Date to Achieve By: 3/2022  Patient expressed understanding of goal: Yes  Action steps to achieve this goal:  4. I would like assistance and support in maintaining my health and wellness to prevent hospital readmission/ED visit  5. I will follow up with my providers as scheduled/recommended   6. I will take my medications as prescribed  4.   I will discuss, review, schedule and complete recommended overdue health        maintenance with my primary care provider  5.   I will contact my care team with  questions, concerns, support needs. I will use the clinic as a resource and I understand I can contact my clinic with 24/7 after hours services available. Care Coordinator will remain available as needed.            Intervention and Education during outreach:   Per patient request, she will reach out to her insurance to see if a provider that was recommended by her daughter is in-network.    [facility]   Priya Wheat RN, Care Coordinator   [facility] [place], Women's Clinic, [facility], [place]  E-mail [email or web address]   [phone]

## 2021-09-23 ENCOUNTER — PATIENT OUTREACH (OUTPATIENT)
Dept: NURSING | Facility: CLINIC | Age: 66
End: 2021-09-23
Payer: COMMERCIAL

## 2021-09-23 NOTE — PROGRESS NOTES
"Clinic Care Coordination Contact    Follow Up Progress Note      Assessment:   RNCC reached out to patient to ensure she is aware of her hospital follow up visit scheduled Monday, 9/27/2021 at 8:40 am.  Per patient is aware and asks about needed \"lab work\".  RNCC relayed a CBC is needed and the clinic may need to draw lab work after her appointment and MyChart her results.  Patient verbalized her understanding.    Patient is back to work and states she is doing well- happy to be back to work.    Care Gaps:    Health Maintenance Due   Topic Date Due     SPIROMETRY  Never done     COPD ACTION PLAN  Never done     Pneumococcal Vaccine: Pediatrics (0 to 5 Years) and At-Risk Patients (6 to 64 Years) (1 of 2 - PPSV23) Never done     HIV SCREENING  Never done     PREVENTIVE CARE VISIT  10/23/2009     ADVANCE CARE PLANNING  05/31/2018     PAP  05/30/2019     INFLUENZA VACCINE (1) 09/01/2021     ZOSTER IMMUNIZATION (2 of 2) 08/03/2021     MAMMO SCREENING  08/21/2021       Currently there are no Care Gaps.    Goals addressed this encounter:   Goals Addressed                    This Visit's Progress       1. Medical (pt-stated)   50%      Goal Statement: I would like assistance and support in maintaining my health and wellness to prevent hospital readmission/ED visit.  Date Goal set: 9/14/2021  Barriers: Discharged last night; 9/13/2021  Strengths: Strong advocate for self  Date to Achieve By: 3/2022  Patient expressed understanding of goal: Yes  Action steps to achieve this goal:  1. I would like assistance and support in maintaining my health and wellness to prevent hospital readmission/ED visit  2. I will follow up with my providers as scheduled/recommended   3. I will take my medications as prescribed  4.   I will discuss, review, schedule and complete recommended overdue health        maintenance with my primary care provider  5.   I will contact my care team with questions, concerns, support needs. I will use the clinic " as a resource and I understand I can contact my clinic with 24/7 after hours services available. Care Coordinator will remain available as needed.            Intervention/Education provided during outreach:   Patient will contact the [facility] care team with questions, concerns, support needs. Patient will use the clinic as a resource and  understands she can contact my clinic with 24/7 after hours services available. Care Coordinator will remain available as needed.     Outreach Frequency: weekly    Plan:   RNCC will reach out to patient after her Monday, 9/27/2021 appointment to follow up on PCP recommendations-including a possible appointment for PFT's.    Care Coordinator will follow up in 7-10 business days    [facility]   Priya Wheat RN, Care Coordinator   [facility] [place], Women's Clinic, [facility]  E-mail [email or web address]   [phone]

## 2021-09-23 NOTE — TELEPHONE ENCOUNTER
Pt to see me next MOnday 9/27/21 at 8:40 am for appt. Please schedule and inform pt. Augmentin added to med intolerance list. Will address pulmonary referral when pt seen back in clinic

## 2021-09-27 ENCOUNTER — OFFICE VISIT (OUTPATIENT)
Dept: INTERNAL MEDICINE | Facility: CLINIC | Age: 66
End: 2021-09-27
Payer: COMMERCIAL

## 2021-09-27 VITALS
RESPIRATION RATE: 16 BRPM | DIASTOLIC BLOOD PRESSURE: 68 MMHG | HEART RATE: 80 BPM | BODY MASS INDEX: 22.28 KG/M2 | OXYGEN SATURATION: 97 % | SYSTOLIC BLOOD PRESSURE: 126 MMHG | WEIGHT: 147 LBS | TEMPERATURE: 97.6 F | HEIGHT: 68 IN

## 2021-09-27 DIAGNOSIS — K86.2 PANCREATIC CYST: ICD-10-CM

## 2021-09-27 DIAGNOSIS — J47.9 BRONCHIECTASIS WITHOUT COMPLICATION (H): ICD-10-CM

## 2021-09-27 DIAGNOSIS — J44.9 CHRONIC OBSTRUCTIVE PULMONARY DISEASE, UNSPECIFIED COPD TYPE (H): ICD-10-CM

## 2021-09-27 DIAGNOSIS — R76.8 POSITIVE ANA (ANTINUCLEAR ANTIBODY): ICD-10-CM

## 2021-09-27 DIAGNOSIS — F17.200 TOBACCO USE DISORDER: ICD-10-CM

## 2021-09-27 DIAGNOSIS — I80.9 PHLEBITIS OF SUPERFICIAL VEIN: ICD-10-CM

## 2021-09-27 LAB
ALBUMIN SERPL-MCNC: 3.3 G/DL (ref 3.4–5)
ALP SERPL-CCNC: 129 U/L (ref 40–150)
ALT SERPL W P-5'-P-CCNC: 45 U/L (ref 0–50)
AST SERPL W P-5'-P-CCNC: 27 U/L (ref 0–45)
BILIRUB DIRECT SERPL-MCNC: 0.1 MG/DL (ref 0–0.2)
BILIRUB SERPL-MCNC: 0.5 MG/DL (ref 0.2–1.3)
ERYTHROCYTE [DISTWIDTH] IN BLOOD BY AUTOMATED COUNT: 14.6 % (ref 10–15)
HCT VFR BLD AUTO: 43 % (ref 35–47)
HGB BLD-MCNC: 13.9 G/DL (ref 11.7–15.7)
MCH RBC QN AUTO: 29.4 PG (ref 26.5–33)
MCHC RBC AUTO-ENTMCNC: 32.3 G/DL (ref 31.5–36.5)
MCV RBC AUTO: 91 FL (ref 78–100)
PLATELET # BLD AUTO: 276 10E3/UL (ref 150–450)
PROT SERPL-MCNC: 7.4 G/DL (ref 6.8–8.8)
RBC # BLD AUTO: 4.73 10E6/UL (ref 3.8–5.2)
WBC # BLD AUTO: 6.9 10E3/UL (ref 4–11)

## 2021-09-27 PROCEDURE — 36415 COLL VENOUS BLD VENIPUNCTURE: CPT | Performed by: INTERNAL MEDICINE

## 2021-09-27 PROCEDURE — 83516 IMMUNOASSAY NONANTIBODY: CPT | Performed by: INTERNAL MEDICINE

## 2021-09-27 PROCEDURE — 99495 TRANSJ CARE MGMT MOD F2F 14D: CPT | Performed by: INTERNAL MEDICINE

## 2021-09-27 PROCEDURE — 80076 HEPATIC FUNCTION PANEL: CPT | Performed by: INTERNAL MEDICINE

## 2021-09-27 PROCEDURE — 85027 COMPLETE CBC AUTOMATED: CPT | Performed by: INTERNAL MEDICINE

## 2021-09-27 RX ORDER — FLUTICASONE PROPIONATE AND SALMETEROL XINAFOATE 115; 21 UG/1; UG/1
2 AEROSOL, METERED RESPIRATORY (INHALATION) 2 TIMES DAILY
Qty: 12 G | Refills: 11 | Status: SHIPPED | OUTPATIENT
Start: 2021-09-27 | End: 2022-03-17 | Stop reason: SINTOL

## 2021-09-27 ASSESSMENT — ANXIETY QUESTIONNAIRES
6. BECOMING EASILY ANNOYED OR IRRITABLE: SEVERAL DAYS
5. BEING SO RESTLESS THAT IT IS HARD TO SIT STILL: SEVERAL DAYS
2. NOT BEING ABLE TO STOP OR CONTROL WORRYING: NOT AT ALL
3. WORRYING TOO MUCH ABOUT DIFFERENT THINGS: NOT AT ALL
7. FEELING AFRAID AS IF SOMETHING AWFUL MIGHT HAPPEN: NOT AT ALL
1. FEELING NERVOUS, ANXIOUS, OR ON EDGE: SEVERAL DAYS
GAD7 TOTAL SCORE: 3
IF YOU CHECKED OFF ANY PROBLEMS ON THIS QUESTIONNAIRE, HOW DIFFICULT HAVE THESE PROBLEMS MADE IT FOR YOU TO DO YOUR WORK, TAKE CARE OF THINGS AT HOME, OR GET ALONG WITH OTHER PEOPLE: NOT DIFFICULT AT ALL

## 2021-09-27 ASSESSMENT — PATIENT HEALTH QUESTIONNAIRE - PHQ9: 5. POOR APPETITE OR OVEREATING: NOT AT ALL

## 2021-09-27 ASSESSMENT — MIFFLIN-ST. JEOR: SCORE: 1265.29

## 2021-09-27 NOTE — PROGRESS NOTES
ASSESSMENT:   1. Bronchiectasis without complication (H)  Additional interstitial lung disease seen on recent chest CT.  Following antibiotic therapy, use of Advair and discontinuation of smoking, patient denies any current cough and breathing well.  No further hemoptysis.  Patient has completed antibiotic therapy.  Will have patient follow-up with pulmonary.  Continue inhaler  - Adult Pulmonary Medicine Referral; Future    2. Chronic obstructive pulmonary disease, unspecified COPD type (H)  Controlled.  Continue current medication.  Pulmonary follow-up as recommended at hospital discharge  - Adult Pulmonary Medicine Referral; Future  - fluticasone-salmeterol (ADVAIR-HFA) 115-21 MCG/ACT inhaler; Inhale 2 puffs into the lungs 2 times daily  Dispense: 12 g; Refill: 11    3. Tobacco use disorder  Patient off of cigarettes since hospital discharge.  States she still has a craving that time.  Recommended nicotine gum or lozenge prn urge    4. Positive OCTAVIANO (antinuclear antibody)  Prior joint pain symptoms have resolved.  Nuclear finding often associated with PVC.  Therefore check labs as ordered  - Mitochondrial M2 Antibody IgG; Future  - CBC with platelets; Future  - Hepatic function panel; Future  - Mitochondrial M2 Antibody IgG  - CBC with platelets  - Hepatic function panel    5. Pancreatic cyst  Requires repeat MRI in 6 months.  Staff message put in epic system to be sent to physician at that time regarding to order    6. Phlebitis of superficial vein  Resolved      PLAN:  Labs as ordered  Appt with Pulmonary in 4 weeks in [place]. Call for appt  Remain off of all cigarettes. ? Option of nicotine gum/lozenge use  Pneumococcal vaccine in a couple weeks after turn 66. Get at pharmacy  Covid booster in mid November  Repeat MRI pancreas in 6 months. Will notify when ordered      (Chart documentation was completed, in part, with [employer] voice-recognition software. Even though reviewed, some grammatical, spelling, and  word errors may remain.)    Leonardo Angela MD  Internal Medicine Department  [facility] is [facility] 65 year [facility] who presents for the following health issues     HPI       Hospital Follow-up Visit:    Hospital/Nursing Home/IP Rehab Facility: [facility]  Date of Admission: 09/10/2021  Date of Discharge: 09/13/2021  Reason(s) for Admission: COPD    Tele contact after discharge 9/14/21      Was your hospitalization related to COVID-19? No   Problems taking medications regularly:  None  Medication changes since discharge: None  Problems adhering to non-medication therapy:  None    Summary of hospitalization:  [facility] discharge summary reviewed  Diagnostic Tests/Treatments reviewed.  Follow up needed: labs, future imaging  Other Healthcare Providers Involved in Patient s Care:         Pulmonary  Update since discharge: improved.       Post Discharge Medication Reconciliation: discharge medications reconciled and changed, per note/orders.  Plan of care communicated with patient               Discharge summary reviewed. Part of the summary as below:    [facility]  Hospitalist Discharge Summary       Date of Admission:  9/10/2021  Date of Discharge:  9/13/2021  Discharging Provider: Shaun Hinton,            Discharge Diagnoses     1.  Hemoptysis.  2.  Community-acquired pneumonia.  3.  Hypothyroidism.  4.  Pancreatic cysts.  5.  Tobacco use disorder.  6.  COPD.  7.  Right upper extremity superficial venous thromboembolism.           Follow-ups Needed After Discharge     Follow-up Appointments     Follow-up and recommended labs and tests       Follow up with primary care provider, Leonardo Angela, within 7 days for   hospital follow- up.  The following labs/tests are recommended: CBC within   7 days.  Follow-up with pulmonology within the next 4 weeks.  Does need   repeat MRI of the abdomen to evaluate  pancreatic cysts in approximately 6   months.  Will need to be set up by primary care provider.                    Discharge Disposition      Discharged to home  Condition at discharge: Stable              Hospital Course     Irene Haile is a 65 year old female admitted on 9/10/2021. She has a history of COPD and previous episode of hemoptysis 8 years ago and presents with hemoptysis. She is otherwise asymptomatic and chest CT reveals interstitial lung disease, bronchiectasis, mucous plugging and groundglass opacities. Past medical history also significant for hypothyroidism, dyslipidemia, ovarian cancer with resection 20 years ago and right axillary lymph node biopsy in 2013 which showed no evidence of malignancy.  She was started on antibiotics with doxycycline and IV ceftriaxone.  Continue antibiotics with doxycycline 100 g twice a day and 875/125 mg twice a day for additional 4 days to complete 7-day course.  Hemoglobin was monitored during hospital stay.  Did have occasional continued hemoptysis earlier in hospital stay.  By time of discharge, hemoptysis has resolved.  She was seen in consultation by pulmonology during hospital stay.  It is recommended that she continue to follow-up with pulmonology in the outpatient setting.  She did have incidentally noted area of enlargement of her pancreas on initial imaging.  This was further evaluated during hospitalization with MRI of the abdomen.  MRI shows 2 cystic appearing structures in the pancreas.  It is recommended by radiology that  she have repeat MRI of the abdomen in 6 months to reevaluate this area.  On 9/13, she did develop some mild swelling at site of previous IV in her right upper extremity.  Ultrasound of this area does show a superficial venous thromboembolism.  Should apply warm compresses to the area to help with resolution of superficial venous thrombosis.  Follow-up with primary care provider in approximately 1 week.  Follow-up with  pulmonology soon as appointment can be made in outpatient setting.  Ideally, within the next few weeks.  Needs repeat CBC in 7 days.         Discharge Orders         CBC with platelets          Care Coordination Referral       Reason for your hospital stay     Pneumonia, hemoptysis.          Follow-up and recommended labs and tests      Follow up with primary care provider, Leonardo Angela, within 7 days for hospital follow- up.  The following labs/tests are recommended: CBC within 7 days.  Follow-up with pulmonology within the next 4 weeks.  Does need repeat MRI of the abdomen to evaluate pancreatic cysts in approximately 6 months.  Will need to be set up by primary care provider.           Patient remains off cigarettes since hospital discharge.  Had some nausea and vomiting issues with Augmentin which resolved stopping the antibiotic.  Patient did not complete a full course of doxycycline.  Denies current cough, shortness of breath, fevers, chills.  No further hemoptysis.  Pancreatic cysts were found on imaging as discussed with the MRI above and will need repeat evaluation in 6 months.  Denies abdominal pain issues.  Previous arthritis joint pain issues resolved.  Lab testing performed at that time showed a mildly positive OCTAVIANO with nuclear dots which can often be found in PBC.  No previous LFT abnormalities.  Denies abdominal pain or itching.  History of intolerance to levothyroxine dose higher than 100 mcg.  Previously tried on 112 mcg daily, patient then developed diarrhea.  No symptoms would be 100 mcg dose.  I developed some right upper extremity superficial phlebitis from IV site.  This was warm packed and symptoms have significantly improved per patient.  No swelling now and minimal tenderness.  Patient states this area was much more significantly swollen initially       Additional ROS:   Constitutional, HEENT, Cardiovascular, Pulmonary, GI and , Neuro, MSK and Psych review of systems/symptoms are otherwise  "negative or unchanged from previous, except as noted above.      OBJECTIVE:  /68   Pulse 80   Temp 97.6  F (36.4  C) (Temporal)   Resp 16   Ht 1.727 m (5' 8\")   Wt 66.7 kg (147 lb)   SpO2 97%   BMI 22.35 kg/m     Estimated body mass index is 22.35 kg/m  as calculated from the following:    Height as of this encounter: 1.727 m (5' 8\").    Weight as of this encounter: 66.7 kg (147 lb).     Neck: no adenopathy. Thyroid normal to palpation. No bruits  Pulm: Lungs clear to auscultation.  No wheezes or rhonchi  CV: Regular rates and rhythm  GI: Soft, nontender, Normal active bowel sounds, No hepatosplenomegaly or masses palpable  Ext: Peripheral pulses intact. No edema. No erythema/increased warmth left antecubital area. Minimal tenderness                 "

## 2021-09-27 NOTE — PATIENT INSTRUCTIONS
Labs as ordered  Appt with Pulmonary in 4 weeks in [place]. Call for appt  Remain off of all cigarettes. ? Option of nicotine gum/lozenge use  Pneumococcal vaccine in a couple weeks after turn 66. Get at pharmacy  Covid booster in mid November  Repeat MRI pancreas in 6 months. Will notify when ordered

## 2021-09-28 ASSESSMENT — ANXIETY QUESTIONNAIRES: GAD7 TOTAL SCORE: 3

## 2021-09-29 LAB — MITOCHONDRIA M2 IGG SER-ACNC: 140 U/ML

## 2021-10-04 ENCOUNTER — PATIENT OUTREACH (OUTPATIENT)
Dept: NURSING | Facility: CLINIC | Age: 66
End: 2021-10-04
Payer: COMMERCIAL

## 2021-10-04 SDOH — ECONOMIC STABILITY: TRANSPORTATION INSECURITY
IN THE PAST 12 MONTHS, HAS LACK OF TRANSPORTATION KEPT YOU FROM MEETINGS, WORK, OR FROM GETTING THINGS NEEDED FOR DAILY LIVING?: NO

## 2021-10-04 SDOH — ECONOMIC STABILITY: FOOD INSECURITY: WITHIN THE PAST 12 MONTHS, YOU WORRIED THAT YOUR FOOD WOULD RUN OUT BEFORE YOU GOT MONEY TO BUY MORE.: NEVER TRUE

## 2021-10-04 SDOH — ECONOMIC STABILITY: INCOME INSECURITY: IN THE LAST 12 MONTHS, WAS THERE A TIME WHEN YOU WERE NOT ABLE TO PAY THE MORTGAGE OR RENT ON TIME?: NO

## 2021-10-04 SDOH — ECONOMIC STABILITY: FOOD INSECURITY: WITHIN THE PAST 12 MONTHS, THE FOOD YOU BOUGHT JUST DIDN'T LAST AND YOU DIDN'T HAVE MONEY TO GET MORE.: NEVER TRUE

## 2021-10-04 SDOH — ECONOMIC STABILITY: TRANSPORTATION INSECURITY
IN THE PAST 12 MONTHS, HAS THE LACK OF TRANSPORTATION KEPT YOU FROM MEDICAL APPOINTMENTS OR FROM GETTING MEDICATIONS?: NO

## 2021-10-04 ASSESSMENT — SOCIAL DETERMINANTS OF HEALTH (SDOH)

## 2021-10-04 ASSESSMENT — LIFESTYLE VARIABLES: HOW OFTEN DO YOU HAVE SIX OR MORE DRINKS ON ONE OCCASION: NEVER

## 2021-10-04 NOTE — LETTER
[facility]  Patient Centered Plan of Care  Updated 10/04/2021  About Me:        Patient Name:  Irene Haile    YOB: 1955  Age:         65 year old   [place] MRN:    [id number] Telephone Information:  Home Phone [phone]   Mobile [phone]       Address:  [address] [place] [address] [place] [address] Email address:  [email or web address]      Emergency Contact(s)    Name Relationship Lgl Grd Work Phone Home Phone Mobile Phone   SRINATH ESCOBEDO Spouse  [phone]           Primary language:  English     needed? No   [employer]:  [phone] op. 1  Other communication barriers: None  Preferred Method of Communication:  Phone  Current living arrangement:    Mobility Status/ Medical Equipment:      Health Maintenance  Health Maintenance Reviewed:      My Access Plan  Medical Emergency 911   Primary Clinic Line [facility] [phone]   24 Hour Appointment Line [phone] or  [phone]) (toll-free)   24 Hour Nurse Line [phone] (toll-free)   Preferred Urgent Care     Preferred Hospital     Preferred Pharmacy [id number] [address] [facility] AT [place] [address] & [facility]     Behavioral Health Crisis Line The National Suicide Prevention Lifeline at 1-485.917.6737 or 911             My Care Team Members  Patient Care Team       Relationship Specialty Notifications Start End    Leonardo Angela MD PCP - General Internal Medicine  7/17/10     Phone: [phone] Fax: [phone]         [address] [place] [address]    Leonardo Angela MD Assigned PCP   2/14/21     Phone: [phone] Fax: [phone]         [address] [place] [address]    Priya Wheat, RN Lead Care Coordinator  Admissions 9/14/21     Shikha Riley MD MD Pulmonary Disease  9/29/21     Phone: [phone] Fax: [phone]         [address] [place] [address] [address]  "[place] [address]            My Care Plans  Self Management and Treatment Plan  Goals and (Comments)  Goals        General     1. Medical (pt-stated)      Notes - Note edited  10/4/2021  4:17 PM by Priya Wheat RN     Goal Statement: I would like assistance and support in maintaining my health and wellness to prevent hospital readmission/ED visit.  Date Goal set: 9/14/2021  Barriers: Discharged 9/13/2021  Strengths: Strong advocate for self  Date to Achieve By: 3/2022  Patient expressed understanding of goal: Yes  Action steps to achieve this goal:  1. I would like assistance and support in maintaining my health and wellness to prevent hospital readmission/ED visit; quit smoking 9/10/2021 \"cold turkey\"  2. I will follow up with my providers as scheduled/recommended (Pulm appt needed in Oct 2021); MRI/ABD in 3/2022  3. I will take my medications as prescribed  4.   I will discuss, review, schedule and complete recommended overdue health maintenance with my primary care provider; turns 66 on 10/13- needs pneumo vacc  5.   I will contact my care team with questions, concerns, support needs. I will use the clinic as a resource and I understand I can contact my clinic with 24/7 after hours services available. Care Coordinator will remain available as needed.             Action Plans on File:                       Advance Care Plans/Directives Type:        My Medical and Care Information  Problem List   Patient Active Problem List   Diagnosis     Tobacco use disorder     ACP (advance care planning)     COPD (chronic obstructive pulmonary disease) (H)     Hypothyroidism, unspecified type     Hyperlipidemia LDL goal <100     Pancreatic cyst     Positive OCTAVIANO (antinuclear antibody)      Current Medications and Allergies:    Allergies   Allergen Reactions     Augmented Betamethasone Diprop [Betamethasone] GI Disturbance     Augmentin       Vomiting/nausea     Codeine      Insomnia       Nitrofurantoin      Nausea, loss appetite "     Shingrix [Zoster Vac Recomb Adjuvanted]      Severe body pain for 3-4 weeks     Current Outpatient Medications   Medication     fluticasone-salmeterol (ADVAIR-HFA) 115-21 MCG/ACT inhaler     levothyroxine (SYNTHROID/LEVOTHROID) 100 MCG tablet     No current facility-administered medications for this visit.         Care Coordination Start Date: 9/14/2021   Frequency of Care Coordination: 2 weeks   Form Last Updated: 10/04/2021

## 2021-10-05 NOTE — PROGRESS NOTES
"Clinic Care Coordination Contact    Follow Up Progress Note      Assessment:   RNCC reached out to patient who states she is doing well.  She states she quit smoking on 9/10/2021 and \"it hasn't been easy\".  She quit \"cold turkey\" and is using nicotine gum and lozenges.    She is aware that she needs to schedule a pulmonary appointment this month.  She is also aware to schedule a abdominal MRI to follow up on the pancreatic cysts.    Myrna states she has a mammogram appointment 11/05/2021.     Care Gaps:    Health Maintenance Due   Topic Date Due     SPIROMETRY  Never done     COPD ACTION PLAN  Never done     Pneumococcal Vaccine: Pediatrics (0 to 5 Years) and At-Risk Patients (6 to 64 Years) (1 of 2 - PPSV23) Never done     HIV SCREENING  Never done     PREVENTIVE CARE VISIT  10/23/2009     ADVANCE CARE PLANNING  05/31/2018     PAP  05/30/2019     ZOSTER IMMUNIZATION (2 of 2) 08/03/2021     MAMMO SCREENING  08/21/2021       Care Gaps Last addressed on today    Goals addressed this encounter:   Goals Addressed                    This Visit's Progress       1. Medical (pt-stated)   60%      Goal Statement: I would like assistance and support in maintaining my health and wellness to prevent hospital readmission/ED visit.  Date Goal set: 9/14/2021  Barriers: Discharged 9/13/2021  Strengths: Strong advocate for self  Date to Achieve By: 3/2022  Patient expressed understanding of goal: Yes  Action steps to achieve this goal:  1. I would like assistance and support in maintaining my health and wellness to prevent hospital readmission/ED visit; quit smoking 9/10/2021 \"cold turkey\"  2. I will follow up with my providers as scheduled/recommended (Pulm appt needed in Oct 2021); MRI/ABD in 3/2022  3. I will take my medications as prescribed  4.   I will discuss, review, schedule and complete recommended overdue health maintenance with my primary care provider; turns 66 on 10/13- needs pneumo vacc  5.   I will contact my care " team with questions, concerns, support needs. I will use the clinic as a resource and I understand I can contact my clinic with 24/7 after hours services available. Care Coordinator will remain available as needed.            Intervention/Education provided during outreach:   None     Outreach Frequency: 2 weeks    Plan: RNCC will follow up after her 66th birthday (10[birth year]) to assess goal progression.    [facility]   Priya Wheat RN, Care Coordinator   [facility] [place], [facility], [facility]  E-mail [email or web address]   [phone]

## 2021-10-24 ENCOUNTER — HEALTH MAINTENANCE LETTER (OUTPATIENT)
Age: 66
End: 2021-10-24

## 2021-11-05 ENCOUNTER — ALLIED HEALTH/NURSE VISIT (OUTPATIENT)
Dept: FAMILY MEDICINE | Facility: CLINIC | Age: 66
End: 2021-11-05
Payer: COMMERCIAL

## 2021-11-05 DIAGNOSIS — Z23 NEED FOR PNEUMOCOCCAL VACCINATION: Primary | ICD-10-CM

## 2021-11-05 PROCEDURE — 90471 IMMUNIZATION ADMIN: CPT

## 2021-11-05 PROCEDURE — 90732 PPSV23 VACC 2 YRS+ SUBQ/IM: CPT

## 2021-11-05 PROCEDURE — 99207 PR NO CHARGE NURSE ONLY: CPT

## 2021-11-05 NOTE — PROGRESS NOTES
Prior to immunization administration, verified patients identity using patient s name and date of birth. Please see Immunization Activity for additional information.     Screening Questionnaire for Adult Immunization    Are you sick today?   No   Do you have allergies to medications, food, a vaccine component or latex?   No   Have you ever had a serious reaction after receiving a vaccination?   Yes   Do you have a long-term health problem with heart, lung, kidney, or metabolic disease (e.g., diabetes), asthma, a blood disorder, no spleen, complement component deficiency, a cochlear implant, or a spinal fluid leak?  Are you on long-term aspirin therapy?   No   Do you have cancer, leukemia, HIV/AIDS, or any other immune system problem?   No   Do you have a parent, brother, or sister with an immune system problem?   No   In the past 3 months, have you taken medications that affect  your immune system, such as prednisone, other steroids, or anticancer drugs; drugs for the treatment of rheumatoid arthritis, Crohn s disease, or psoriasis; or have you had radiation treatments?   No   Have you had a seizure, or a brain or other nervous system problem?   No   During the past year, have you received a transfusion of blood or blood    products, or been given immune (gamma) globulin or antiviral drug?   No   For women: Are you pregnant or is there a chance you could become       pregnant during the next month?   No   Have you received any vaccinations in the past 4 weeks?   No     Immunization questionnaire was positive for at least one answer.  Notified Dr. Bland.        Per orders of Dr. Bland, injection of Pneumovax 23  given by Katy Islas MA. Patient instructed to remain in clinic for 15 minutes afterwards, and to report any adverse reaction to me immediately.       Screening performed by Katy Islas MA on 11/5/2021 at 10:36 AM.

## 2021-11-29 ENCOUNTER — PATIENT OUTREACH (OUTPATIENT)
Dept: NURSING | Facility: CLINIC | Age: 66
End: 2021-11-29
Payer: COMMERCIAL

## 2021-11-29 NOTE — PROGRESS NOTES
"Clinic Care Coordination Contact    Follow Up Progress Note      Assessment:   RNCC reached out to patient who states she had a \"wonderful \" Thanksgiving and is getting ready for [place].  Per patient, \"I'm still struggling with not smoking and refraining from it\".  It's been a little difficult over Thanksgiving and seeing other family members smoking yet she is still has her gum and lozenges and is taking it \"day to day\".    Per patient, she has received her influenza, pneumothorax, and Shingrix vaccines and will obtain the COVID 19 booster next month.  She had severe body aches and weakness after the Shingrix injection that she \"almost went into the ER \"but I couldn't get out of bed!\".  Shingrix has been placed on her allergy list.    Patient has a mammogram scheduled for 12/10/2021 that had been rescheduled twice by [facility] due to staffing issues.  Patient has an upcoming Pulmonary appointment on 12/13/2021 and she is aware that she needs to schedule an ABD/MRI in 3/2022 to follow up on pancreatic cyst noted on her past admission.    Care Gaps:    Health Maintenance Due   Topic Date Due     SPIROMETRY  Never done     COPD ACTION PLAN  Never done     HIV SCREENING  Never done     ADVANCE CARE PLANNING  05/31/2018     MAMMO SCREENING  08/21/2021     MEDICARE ANNUAL WELLNESS VISIT  10/13/2021     COVID-19 Vaccine (3 - Booster for Pfizer series) 11/14/2021     ZOSTER IMMUNIZATION (2 of 2) 08/03/2021       Scheduled for next RNCC outreach      Goals addressed this encounter:   Goals Addressed                    This Visit's Progress       1. Medical (pt-stated)         Goal Statement: I would like assistance and support in maintaining my health and wellness to prevent hospital readmission/ED visit.  Date Goal set: 9/14/2021  Barriers: Discharged 9/13/2021  Strengths: Strong advocate for self  Date to Achieve By: 3/2022  Patient expressed understanding of goal: Yes  Action steps to achieve this goal:  1. I " "would like assistance and support in maintaining my health and wellness to prevent hospital readmission/ED visit; quit smoking 9/10/2021 \"cold turkey\"  2. I will follow up with my providers as scheduled/recommended (Pulm appt scheduled 12/13/21); Mammogram 12/10/21 MRI/ABD in 3/2022  3. I will take my medications as prescribed  4.   I will discuss, review, schedule and complete recommended overdue health maintenance with my primary care provider; turns 66 on 10/13- needs pneumo vacc  5.   I will contact my care team with questions, concerns, support needs. I will use the clinic as a resource and I understand I can contact my clinic with 24/7 after hours services available  6.  Care Coordinator will remain available as needed            Intervention/Education provided during outreach:   RNCC and spoke called with patient; introduced self, discussed role of Care Coordination, and explained reason for call     Outreach Frequency: monthly    Plan:   -Patient will contact the care team with questions, concerns, support needs   -Patient will use the clinic as a resource and understands she can contact the [facility] with 24/7 after hours services available  -Care Coordinator will remain available as needed  -RNCC will follow up in one month for follow up appointments/recommendations and goal progression    Care Coordinator will follow up in 1 month    [facility]   Priya Wheat RN, Care Coordinator   [facility] [place], [facility], [facility], [place]  E-mail [email or web address]   [phone]      "

## 2021-12-10 ENCOUNTER — ANCILLARY PROCEDURE (OUTPATIENT)
Dept: MAMMOGRAPHY | Facility: CLINIC | Age: 66
End: 2021-12-10
Attending: INTERNAL MEDICINE
Payer: COMMERCIAL

## 2021-12-10 PROCEDURE — 77067 SCR MAMMO BI INCL CAD: CPT | Mod: TC | Performed by: RADIOLOGY

## 2021-12-13 ENCOUNTER — ALLIED HEALTH/NURSE VISIT (OUTPATIENT)
Dept: PULMONOLOGY | Facility: CLINIC | Age: 66
End: 2021-12-13
Payer: COMMERCIAL

## 2021-12-13 ENCOUNTER — OFFICE VISIT (OUTPATIENT)
Dept: PULMONOLOGY | Facility: CLINIC | Age: 66
End: 2021-12-13
Attending: INTERNAL MEDICINE
Payer: COMMERCIAL

## 2021-12-13 VITALS
DIASTOLIC BLOOD PRESSURE: 70 MMHG | WEIGHT: 155.9 LBS | HEART RATE: 75 BPM | SYSTOLIC BLOOD PRESSURE: 122 MMHG | TEMPERATURE: 98.5 F | HEIGHT: 68 IN | OXYGEN SATURATION: 96 % | BODY MASS INDEX: 23.63 KG/M2

## 2021-12-13 DIAGNOSIS — J44.9 CHRONIC OBSTRUCTIVE PULMONARY DISEASE, UNSPECIFIED COPD TYPE (H): Primary | ICD-10-CM

## 2021-12-13 DIAGNOSIS — R93.89 ABNORMAL CHEST CT: Primary | ICD-10-CM

## 2021-12-13 DIAGNOSIS — J47.9 BRONCHIECTASIS WITHOUT COMPLICATION (H): ICD-10-CM

## 2021-12-13 DIAGNOSIS — J44.9 CHRONIC OBSTRUCTIVE PULMONARY DISEASE, UNSPECIFIED COPD TYPE (H): ICD-10-CM

## 2021-12-13 PROCEDURE — 99205 OFFICE O/P NEW HI 60 MIN: CPT | Mod: 25 | Performed by: INTERNAL MEDICINE

## 2021-12-13 PROCEDURE — 94729 DIFFUSING CAPACITY: CPT | Performed by: INTERNAL MEDICINE

## 2021-12-13 PROCEDURE — 94726 PLETHYSMOGRAPHY LUNG VOLUMES: CPT | Performed by: INTERNAL MEDICINE

## 2021-12-13 PROCEDURE — 94375 RESPIRATORY FLOW VOLUME LOOP: CPT | Performed by: INTERNAL MEDICINE

## 2021-12-13 ASSESSMENT — MIFFLIN-ST. JEOR: SCORE: 1300.66

## 2021-12-13 ASSESSMENT — PAIN SCALES - GENERAL: PAINLEVEL: NO PAIN (0)

## 2021-12-13 NOTE — PROGRESS NOTES
Pulmonary Clinic New Patient Consult  Reason for Consult: abnormal chest imaging, ? COPD  History of Present Illness    Ms. Myrna Haile is a 66 yof with a history of tobacco use who presents to pulmonary clinic today for hospital follow up and further evaluation of abnormal chest imaging and possible COPD.    Review of records is notable for the following:    -September 27 PCP: continued on Advair -21 for presumed COPD.  Smoking cessation recommended.  Positive OCTAVIANO 1:160 in nuclear pattern referenced - unclear etiology.    -September 10-13: Hospitalized at [facility] with hemoptysis.  Ultimately found to have pneumonia.  CT chest during admission showed possible new interstitial lung disease with subpleural cystic changes and traction bronchiectasis with mucous plugging in the lower lobe airways and patchy ground glass opacities suggestive of superimposed infection.  Seen by the pulmonary consult service who recommended outpatient follow-up in pulmonary with repeat CT and continuation of PTA Advair as well as smoking cessation.  She was discharged to complete a course of antibiotics with doxycycline.    She returns to clinic today overall doing well.  She describes slight shortness of breath with exertion or taking multiple flights of stairs.  This has been going on since prior to her hospitalization.  If walking on level ground at her own pace, she would not have to stop and rest though.  She has a cough off and on which is sometimes productive of grayish phlegm, more so if she has been smoking.  Denies any recurrent hemoptysis since being in the hospital.  Denies any shortness of breath at rest.  Continues to take Advair which was prescribed for her following an upper respiratory infection in early 2020.  She has found it to be helpful.  No prior history of asthma or recurrent pneumonias.  She has an albuterol inhaler available for as needed use and uses this very rarely.    She is a current every  day smoker since the age of 17.  On average she is smoked approximately 1 pack/day.  She quit for 2 weeks following her hospitalization in September but is now back to smoking a few cigarettes per day.  She is employed by [employer] as an .  Denies any known exposure to asbestos.  She has 3 dogs at home.  Denies any significant exposure to birds, pillows or comforter stuffed with down feathers, hot tub or Jacuzzi she uses on a regular basis, or recent travel outside the area.    No known family history of lung disease.    Review of Systems:  10 of 14 systems reviewed and are negative unless otherwise stated in HPI.    Past Medical History:   Diagnosis Date     Axillary adenopathy 2013     COPD (chronic obstructive pulmonary disease) (H) 2013     Hypothyroidism, unspecified type 2017     Personal history of tobacco use, presenting hazards to health      Pulmonary hemorrhage 2013     UTI (urinary tract infection)        Past Surgical History:   Procedure Laterality Date     C TOTAL ABDOM HYSTERECTOMY       BSO     ZZC NONSPECIFIC PROCEDURE   2010    Laparoscopic appendectomy       Family History   Problem Relation Age of Onset     Diabetes Brother         Diag. age 41     Breast Cancer Mother      Cancer Mother         All over her body     Alcohol/Drug Father         Has Liver Problems     Cancer - colorectal Brother         before age 50     Cancer Brother         liver cancer      Family History Negative Brother         ulcerative colitis     Family History Negative Sister      Sjogren's Daughter        Social History     Socioeconomic History     Marital status:      Spouse name: Larry     Number of children: 2     Years of education: 15     Highest education level: None   Occupational History     Occupation: Clerical     Employer: [employer]    Tobacco Use     Smoking status: Current Some Day Smoker     Packs/day: 0.50     Years: 20.00     Pack  years: 10.00     Types: Cigarettes     Last attempt to quit: 9/10/2021     Years since quittin.2     Smokeless tobacco: Never Used     Tobacco comment:     Substance and Sexual Activity     Alcohol use: No     Drug use: No     Sexual activity: Yes     Partners: Male     Birth control/protection: Surgical     Comment: hysterectomy-   Other Topics Concern     Parent/sibling w/ CABG, MI or angioplasty before 65F 55M? Not Asked   Social History Narrative     None     Social Determinants of Health     Financial Resource Strain: Low Risk      Difficulty of Paying Living Expenses: Not very hard   Food Insecurity: No Food Insecurity     Worried About Running Out of Food in the Last Year: Never true     Ran Out of Food in the Last Year: Never true   Transportation Needs: No Transportation Needs     Lack of Transportation (Medical): No     Lack of Transportation (Non-Medical): No   Physical Activity: Not on file   Stress: Stress Concern Present     Feeling of Stress : To some extent   Social Connections: Moderately Isolated     Frequency of Communication with Friends and Family: More than three times a week     Frequency of Social Gatherings with Friends and Family: Once a week     Attends Worship Services: Never     Active Member of Clubs or Organizations: No     Attends Club or Organization Meetings: Never     Marital Status:    Intimate Partner Violence: Not on file   Housing Stability: Low Risk      Unable to Pay for Housing in the Last Year: No     Number of Places Lived in the Last Year: 1     Unstable Housing in the Last Year: No         Allergies   Allergen Reactions     Augmented Betamethasone Diprop [Betamethasone] GI Disturbance     Augmentin       Vomiting/nausea     Codeine      Insomnia       Nitrofurantoin      Nausea, loss appetite     Shingrix [Zoster Vac Recomb Adjuvanted]      Severe body pain for 3-4 weeks         Current Outpatient Medications:      fluticasone-salmeterol (ADVAIR-HFA)  "115-21 MCG/ACT inhaler, Inhale 2 puffs into the lungs 2 times daily, Disp: 12 g, Rfl: 11     levothyroxine (SYNTHROID/LEVOTHROID) 100 MCG tablet, TAKE 1 TABLET(100 MCG) BY MOUTH DAILY, Disp: 90 tablet, Rfl: 3      Physical Exam:  /70 (BP Location: Left arm, Patient Position: Sitting, Cuff Size: Adult Regular)   Pulse 75   Temp 98.5  F (36.9  C) (Oral)   Ht 1.727 m (5' 8\")   Wt 70.7 kg (155 lb 14.4 oz)   SpO2 96%   BMI 23.70 kg/m    GENERAL: Well developed, well nourished, alert, and in no apparent distress.  HEENT: Normocephalic, atraumatic. PERRL, EOMI.   NECK: supple, no masses, no thyromegaly.  RESP:  Normal respiratory effort.  CTAB.  No rales, wheezes, rhonchi.  No cyanosis or clubbing.  CV: Normal S1, S2, regular rhythm, normal rate. No murmur.  No LE edema.   ABDOMEN:  non-distended.   SKIN: warm and dry. No rash.  NEURO: AAOx3.  Normal gait.  Fluent speech.  PSYCH: mentation appears normal.     Results:  PFTs: Some difficulty with pleth limiting interpretation.  Ratio 60%, %, FEV1 113%, %, %, DLCO 75%.  Study demonstrates normal pulmonary mechanics and normal gas exchange.  There is no prior study available for comparison.  Imaging (personally reviewed in clinic today):  CT Chest 9/10:  1.  Interstitial lung disease appears new compared to the prior study and is associated with subpleural cystic change and traction bronchiectasis.  2.  Areas of mucous plugging in bronchiectatic lower lobe airway suggestive of a superimposed infectious process. These are associated with patchy groundglass opacities.  3.  Unilateral left axillary lymphadenopathy. This could be associated with vaccination or inflammatory process in the left arm.  4.  Periportal lymphadenopathy incompletely evaluated on this study of the chest only. There are also abnormalities in what appears to be an enlarged body of the pancreas. Follow-up pancreatic MRI recommended.  5.  No evidence of pulmonary " "embolism.      Assessment and Plan:   Irene Haile is a 66 year old female with a history of tobacco abuse who presents to pulmonary clinic today for further evaluation.  1. COPD.  Does not technically meet diagnostic criteria as PFTs are normal. However given personal history of tobacco abuse and mild respiratory symptoms (treated with advair) she likely meets criteria for \"Gold 0\" or \"Pre-COPD\".  Based on this we discussed that complete smoking cessation is strongly recommended.  It would also be reasonable to continue her advair.  In theory, she could probably be switched to a LAMA or a LABA but she is tolerating her advair and it works well for her so we agreed to leave it.    2. Abnormal CT chest.  CT chest obtained during her recent hospital admission showed possible new ILD with subpleural cysts and traction bronchiectasis. The combination of these findings and her recent positive OCTAVIANO raise the possibility of some underlying, as of yet, undiagnosed connective tissue disease.  There was also ground glass opacity and mucous plugging in the lower lobe airways suggestive of superimposed infection for which she was treated at that time.  We will plan to obtain repeat CT now to assess for resolution of infectious/inflammatory findings and to better discern the presence and extent of possible new ILD.   She is agreeable to this.  3. Hemoptysis.  Resolved with no further episodes since hospital discharge.  We discussed that acute infection is the most common cause of episodic hemoptysis and that the presence of bronchiectasis further increases this risk of hemoptysis due to the presence of ectatic blood vessels.  No further additional work up is necessary at this time.  4. Tobacco use.  Complete smoking cessation was strongly recommended.  Questions and concerns were answered to the patient's satisfaction.  she was provided with my contact information should new questions or concerns arise in the " interim.  Tentative plan to return to clinic for follow up in 3 months.  We'll be in touch sooner pending the results of her forthcoming CT scan.  Up to date on Pneumovax (2021) and a seasonal influenza vaccine.  COVID vaccinated.    Lara Riley MD  Pulmonary and Critical Care Medicine    The above note was dictated using voice recognition software and may include typographical errors. Please contact the author for any clarifications.  I spent 65 minutes on the date of encounter doing chart review, review of outside records, review of test results, conducting the patient visit, completing documentation and further activities as noted above.

## 2021-12-13 NOTE — NURSING NOTE
"Chief Complaint   Patient presents with     New Patient     COPD       Vitals:    12/13/21 1010   BP: 122/70   BP Location: Left arm   Patient Position: Sitting   Cuff Size: Adult Regular   Pulse: 75   Temp: 98.5  F (36.9  C)   TempSrc: Oral   SpO2: 96%   Weight: 70.7 kg (155 lb 14.4 oz)   Height: 1.727 m (5' 8\")       Body mass index is 23.7 kg/m .      Maribel Mast MA    "

## 2021-12-13 NOTE — LETTER
12/13/2021     RE: Irene Haile  [address]  [place] [address]        Dear Colleague,    Thank you for referring your patient, Irene Haile, to the [facility] [place]. Please see a copy of my visit note below.    Pulmonary Clinic New Patient Consult  Reason for Consult: abnormal chest imaging, ? COPD  History of Present Illness    Ms. Myrna Haile is a 66 yof with a history of tobacco use who presents to pulmonary clinic today for hospital follow up and further evaluation of abnormal chest imaging and possible COPD.    Review of records is notable for the following:    -September 27 PCP: continued on Advair -21 for presumed COPD.  Smoking cessation recommended.  Positive OCTAVIANO 1:160 in nuclear pattern referenced - unclear etiology.    -September 10-13: Hospitalized at [facility] with hemoptysis.  Ultimately found to have pneumonia.  CT chest during admission showed possible new interstitial lung disease with subpleural cystic changes and traction bronchiectasis with mucous plugging in the lower lobe airways and patchy ground glass opacities suggestive of superimposed infection.  Seen by the pulmonary consult service who recommended outpatient follow-up in pulmonary with repeat CT and continuation of PTA Advair as well as smoking cessation.  She was discharged to complete a course of antibiotics with doxycycline.    She returns to clinic today overall doing well.  She describes slight shortness of breath with exertion or taking multiple flights of stairs.  This has been going on since prior to her hospitalization.  If walking on level ground at her own pace, she would not have to stop and rest though.  She has a cough off and on which is sometimes productive of grayish phlegm, more so if she has been smoking.  Denies any recurrent hemoptysis since being in the hospital.  Denies any shortness of breath at rest.  Continues to take Advair which was prescribed for  her following an upper respiratory infection in early .  She has found it to be helpful.  No prior history of asthma or recurrent pneumonias.  She has an albuterol inhaler available for as needed use and uses this very rarely.    She is a current every day smoker since the age of 17.  On average she is smoked approximately 1 pack/day.  She quit for 2 weeks following her hospitalization in September but is now back to smoking a few cigarettes per day.  She is employed by [employer] as an .  Denies any known exposure to asbestos.  She has 3 dogs at home.  Denies any significant exposure to birds, pillows or comforter stuffed with down feathers, hot tub or Jacuzzi she uses on a regular basis, or recent travel outside the area.    No known family history of lung disease.    Review of Systems:  10 of 14 systems reviewed and are negative unless otherwise stated in HPI.    Past Medical History:   Diagnosis Date     Axillary adenopathy 2013     COPD (chronic obstructive pulmonary disease) (H) 2013     Hypothyroidism, unspecified type 2017     Personal history of tobacco use, presenting hazards to health      Pulmonary hemorrhage 2013     UTI (urinary tract infection)        Past Surgical History:   Procedure Laterality Date     C TOTAL ABDOM HYSTERECTOMY       BSO     ZZC NONSPECIFIC PROCEDURE   2010    Laparoscopic appendectomy       Family History   Problem Relation Age of Onset     Diabetes Brother         Diag. age 41     Breast Cancer Mother      Cancer Mother         All over her body     Alcohol/Drug Father         Has Liver Problems     Cancer - colorectal Brother         before age 50     Cancer Brother         liver cancer      Family History Negative Brother         ulcerative colitis     Family History Negative Sister      Sjogren's Daughter        Social History     Socioeconomic History     Marital status:      Spouse name: Larry      Number of children: 2     Years of education: 15     Highest education level: None   Occupational History     Occupation: Clerical     Employer: [employer]    Tobacco Use     Smoking status: Current Some Day Smoker     Packs/day: 0.50     Years: 20.00     Pack years: 10.00     Types: Cigarettes     Last attempt to quit: 9/10/2021     Years since quittin.2     Smokeless tobacco: Never Used     Tobacco comment:     Substance and Sexual Activity     Alcohol use: No     Drug use: No     Sexual activity: Yes     Partners: Male     Birth control/protection: Surgical     Comment: hysterectomy-   Other Topics Concern     Parent/sibling w/ CABG, MI or angioplasty before 65F 55M? Not Asked   Social History Narrative     None     Social Determinants of Health     Financial Resource Strain: Low Risk      Difficulty of Paying Living Expenses: Not very hard   Food Insecurity: No Food Insecurity     Worried About Running Out of Food in the Last Year: Never true     Ran Out of Food in the Last Year: Never true   Transportation Needs: No Transportation Needs     Lack of Transportation (Medical): No     Lack of Transportation (Non-Medical): No   Physical Activity: Not on file   Stress: Stress Concern Present     Feeling of Stress : To some extent   Social Connections: Moderately Isolated     Frequency of Communication with Friends and Family: More than three times a week     Frequency of Social Gatherings with Friends and Family: Once a week     Attends Sikh Services: Never     Active Member of Clubs or Organizations: No     Attends Club or Organization Meetings: Never     Marital Status:    Intimate Partner Violence: Not on file   Housing Stability: Low Risk      Unable to Pay for Housing in the Last Year: No     Number of Places Lived in the Last Year: 1     Unstable Housing in the Last Year: No         Allergies   Allergen Reactions     Augmented Betamethasone Diprop [Betamethasone] GI Disturbance     Augmentin  "      Vomiting/nausea     Codeine      Insomnia       Nitrofurantoin      Nausea, loss appetite     Shingrix [Zoster Vac Recomb Adjuvanted]      Severe body pain for 3-4 weeks         Current Outpatient Medications:      fluticasone-salmeterol (ADVAIR-HFA) 115-21 MCG/ACT inhaler, Inhale 2 puffs into the lungs 2 times daily, Disp: 12 g, Rfl: 11     levothyroxine (SYNTHROID/LEVOTHROID) 100 MCG tablet, TAKE 1 TABLET(100 MCG) BY MOUTH DAILY, Disp: 90 tablet, Rfl: 3      Physical Exam:  /70 (BP Location: Left arm, Patient Position: Sitting, Cuff Size: Adult Regular)   Pulse 75   Temp 98.5  F (36.9  C) (Oral)   Ht 1.727 m (5' 8\")   Wt 70.7 kg (155 lb 14.4 oz)   SpO2 96%   BMI 23.70 kg/m    GENERAL: Well developed, well nourished, alert, and in no apparent distress.  HEENT: Normocephalic, atraumatic. PERRL, EOMI.   NECK: supple, no masses, no thyromegaly.  RESP:  Normal respiratory effort.  CTAB.  No rales, wheezes, rhonchi.  No cyanosis or clubbing.  CV: Normal S1, S2, regular rhythm, normal rate. No murmur.  No LE edema.   ABDOMEN:  non-distended.   SKIN: warm and dry. No rash.  NEURO: AAOx3.  Normal gait.  Fluent speech.  PSYCH: mentation appears normal.     Results:  PFTs: Some difficulty with pleth limiting interpretation.  Ratio 60%, %, FEV1 113%, %, %, DLCO 75%.  Study demonstrates normal pulmonary mechanics and normal gas exchange.  There is no prior study available for comparison.  Imaging (personally reviewed in clinic today):  CT Chest 9/10:  1.  Interstitial lung disease appears new compared to the prior study and is associated with subpleural cystic change and traction bronchiectasis.  2.  Areas of mucous plugging in bronchiectatic lower lobe airway suggestive of a superimposed infectious process. These are associated with patchy groundglass opacities.  3.  Unilateral left axillary lymphadenopathy. This could be associated with vaccination or inflammatory process in the left " "arm.  4.  Periportal lymphadenopathy incompletely evaluated on this study of the chest only. There are also abnormalities in what appears to be an enlarged body of the pancreas. Follow-up pancreatic MRI recommended.  5.  No evidence of pulmonary embolism.      Assessment and Plan:   Irene Haile is a 66 year old female with a history of tobacco abuse who presents to pulmonary clinic today for further evaluation.  1. COPD.  Does not technically meet diagnostic criteria as PFTs are normal. However given personal history of tobacco abuse and mild respiratory symptoms (treated with advair) she likely meets criteria for \"Gold 0\" or \"Pre-COPD\".  Based on this we discussed that complete smoking cessation is strongly recommended.  It would also be reasonable to continue her advair.  In theory, she could probably be switched to a LAMA or a LABA but she is tolerating her advair and it works well for her so we agreed to leave it.    2. Abnormal CT chest.  CT chest obtained during her recent hospital admission showed possible new ILD with subpleural cysts and traction bronchiectasis. The combination of these findings and her recent positive OCTAVIANO raise the possibility of some underlying, as of yet, undiagnosed connective tissue disease.  There was also ground glass opacity and mucous plugging in the lower lobe airways suggestive of superimposed infection for which she was treated at that time.  We will plan to obtain repeat CT now to assess for resolution of infectious/inflammatory findings and to better discern the presence and extent of possible new ILD.   She is agreeable to this.  3. Hemoptysis.  Resolved with no further episodes since hospital discharge.  We discussed that acute infection is the most common cause of episodic hemoptysis and that the presence of bronchiectasis further increases this risk of hemoptysis due to the presence of ectatic blood vessels.  No further additional work up is necessary at this " time.  4. Tobacco use.  Complete smoking cessation was strongly recommended.  Questions and concerns were answered to the patient's satisfaction.  she was provided with my contact information should new questions or concerns arise in the interim.  Tentative plan to return to clinic for follow up in 3 months.  We'll be in touch sooner pending the results of her forthcoming CT scan.  Up to date on Pneumovax (2021) and a seasonal influenza vaccine.  COVID vaccinated.    Lara Riley MD  Pulmonary and Critical Care Medicine    The above note was dictated using voice recognition software and may include typographical errors. Please contact the author for any clarifications.  I spent 65 minutes on the date of encounter doing chart review, review of outside records, review of test results, conducting the patient visit, completing documentation and further activities as noted above.  Again, thank you for allowing me to participate in the care of your patient.      Sincerely,      Shikha Riley MD

## 2021-12-14 LAB
DLCOUNC-%PRED-PRE: 75 %
DLCOUNC-PRE: 17.18 ML/MIN/MMHG
DLCOUNC-PRED: 22.62 ML/MIN/MMHG
ERV-%PRED-PRE: 133 %
ERV-PRE: 1.28 L
ERV-PRED: 0.96 L
EXPTIME-PRE: 6.98 SEC
FEF2575-%PRED-PRE: 79 %
FEF2575-PRE: 1.78 L/SEC
FEF2575-PRED: 2.25 L/SEC
FEFMAX-%PRED-PRE: 109 %
FEFMAX-PRE: 7.26 L/SEC
FEFMAX-PRED: 6.63 L/SEC
FEV1-%PRED-PRE: 113 %
FEV1-PRE: 3.05 L
FEV1FEV6-PRE: 70 %
FEV1FEV6-PRED: 80 %
FEV1FVC-PRE: 68 %
FEV1FVC-PRED: 78 %
FEV1SVC-PRE: 74 %
FEV1SVC-PRED: 73 %
FIFMAX-PRE: 5.41 L/SEC
FRCPLETH-%PRED-PRE: 134 %
FRCPLETH-PRE: 3.95 L
FRCPLETH-PRED: 2.93 L
FVC-%PRED-PRE: 129 %
FVC-PRE: 4.47 L
FVC-PRED: 3.45 L
IC-%PRED-PRE: 104 %
IC-PRE: 2.84 L
IC-PRED: 2.72 L
RVPLETH-%PRED-PRE: 122 %
RVPLETH-PRE: 2.66 L
RVPLETH-PRED: 2.17 L
TLCPLETH-%PRED-PRE: 120 %
TLCPLETH-PRE: 6.78 L
TLCPLETH-PRED: 5.61 L
VA-%PRED-PRE: 96 %
VA-PRE: 5.41 L
VC-%PRED-PRE: 112 %
VC-PRE: 4.12 L
VC-PRED: 3.67 L

## 2021-12-19 ENCOUNTER — HEALTH MAINTENANCE LETTER (OUTPATIENT)
Age: 66
End: 2021-12-19

## 2021-12-29 ENCOUNTER — PATIENT OUTREACH (OUTPATIENT)
Dept: NURSING | Facility: CLINIC | Age: 66
End: 2021-12-29
Payer: COMMERCIAL

## 2021-12-29 NOTE — LETTER
[address] [facility] [address]    [facility]  Patient Centered Plan of Care  About Me:        Patient Name:  Irene Haile    YOB: 1955  Age:         66 year old   Edy MRN:    [id number] Telephone Information:  Home Phone [phone]   Mobile [phone]       Address:  [address] [place] [address] Email address:  [email or web address]      Emergency Contact(s)    Name Relationship Lgl Grd Work Phone Home Phone Mobile Phone   SRINATH ESCOBEDO Spouse  [phone]           Primary language:  English     needed? No   [place] [employer]:  [phone] op. 1  Other communication barriers:None    Preferred Method of Communication:  Phone  Current living arrangement: I live in a private home with spouse    Mobility Status/ Medical Equipment: Independent        Health Maintenance  Health Maintenance Reviewed: Due/Overdue   Health Maintenance Due   Topic Date Due     COPD ACTION PLAN  Never done     HIV SCREENING  Never done     ADVANCE CARE PLANNING  05/31/2018     MEDICARE ANNUAL WELLNESS VISIT  10/13/2021     COVID-19 Vaccine (3 - Booster for Pfizer series) 11/14/2021     ZOSTER IMMUNIZATION (2 of 2) 08/03/2021           My Access Plan  Medical Emergency 911   Primary Clinic Line [facility] [phone]   24 Hour Appointment Line [phone] or  [phone] (toll-free)   24 Hour Nurse Line [phone] (toll-free)   Preferred Urgent Care [facility], [phone]     Preferred Hospital [facility]  [phone]     [facility]acy [id number] - [place], [place] - [address] AT [place] [address] & [facility]     Behavioral Health Crisis Line The National Suicide Prevention Lifeline at 1-566.744.4234 or 911             My Care Team Members  Patient Care Team       Relationship Specialty Notifications Start End     "Leonardo Angela MD PCP - General Internal Medicine  7/17/10     Phone: [phone] Fax: [phone]         [address] [place] [address]    Leonardo Angela MD Assigned PCP   2/14/21     Phone: [phone] Fax: [phone]         [address] [place] [address]    Priya Wheat, RN Lead Care Coordinator  Admissions 9/14/21     Shikha Riley MD MD Pulmonary Disease  9/29/21     Phone: [phone] Fax: [phone]         [address] [facility] [address] [facility] [address]    Shikha Riley MD Assigned Pulmonology Provider   12/19/21     Phone: [phone] Fax: [phone]         [address] [facility] [address] [facility] [address]            My Care Plans  Self Management and Treatment Plan  Goals and (Comments)  Goals        General     1. Medical (pt-stated)      Notes - Note edited  12/29/2021  4:12 PM by Priya Wheat RN     Goal Statement: I would like assistance and support in maintaining my health and wellness to prevent hospital readmission/ED visit.  Date Goal set: 9/14/2021  Barriers: Discharged 9/13/2021  Strengths: Strong advocate for self  Date to Achieve By: 3/2022  Patient expressed understanding of goal: Yes  Action steps to achieve this goal:  1. I would like assistance and support in maintaining my health and wellness to prevent hospital readmission/ED visit; quit smoking 9/10/2021 \"cold turkey\" x 2 weeks; Started smoking 10/2021  2. I will follow up with my providers as scheduled/recommended (MRI/ABD in 3/2022)  3. I will take my medications as prescribed  4.   I will discuss, review, schedule and complete recommended overdue health maintenance with my primary care provider  5.   I will contact my care team with questions, concerns, support needs  6.   I will use the clinic as a resource and I understand I can contact my clinic with 24/7 after hours services available  7.  Care Coordinator will remain available as needed             Action Plans on File:                   "     Advance Care Plans/Directives Type:   No data recorded    My Medical and Care Information  Problem List   Patient Active Problem List   Diagnosis     Tobacco use disorder     ACP (advance care planning)     COPD (chronic obstructive pulmonary disease) (H)     Hypothyroidism, unspecified type     Hyperlipidemia LDL goal <100     Pancreatic cyst     Positive OCTAVIANO (antinuclear antibody)      Current Medications and Allergies:    Allergies   Allergen Reactions     Augmented Betamethasone Diprop [Betamethasone] GI Disturbance     Augmentin       Vomiting/nausea     Codeine      Insomnia       Nitrofurantoin      Nausea, loss appetite     Shingrix [Zoster Vac Recomb Adjuvanted]      Severe body pain for 3-4 weeks     Current Outpatient Medications   Medication     fluticasone-salmeterol (ADVAIR-HFA) 115-21 MCG/ACT inhaler     levothyroxine (SYNTHROID/LEVOTHROID) 100 MCG tablet     No current facility-administered medications for this visit.         Care Coordination Start Date: 9/14/2021   Frequency of Care Coordination: monthly     Form Last Updated: 12/29/2021

## 2021-12-29 NOTE — PROGRESS NOTES
"Clinic Care Coordination Contact    Follow Up Progress Note      Assessment:   RNCC reached out to patient who states she is doing \"great\".    Patient states she completed her mammogram (negative), Pulm appointment/PFT's and obtained her   Pneumococcal vaccine.  Per patient, she is working on obtaining the COVID-19 booster.    Patient is working on smoking cessation.  She was able to stop for 2 weeks post hospital stay yet somehow, \"I started up again\".  She using the gum and lozenges; however, \"They really don't work\".  RNCC suggested medication which she is aware of may consider.  She has the [employer] Smoking Cessation phone number as she is employed by [employer].    Care Gaps:    Health Maintenance Due   Topic Date Due     COPD ACTION PLAN  Never done     HIV SCREENING  Never done     ADVANCE CARE PLANNING  05/31/2018     MEDICARE ANNUAL WELLNESS VISIT  10/13/2021     COVID-19 Vaccine (3 - Booster for Pfizer series) 11/14/2021     ZOSTER IMMUNIZATION (2 of 2) 08/03/2021       Goals addressed this encounter:   Goals Addressed                    This Visit's Progress       1. Medical (pt-stated)         Goal Statement: I would like assistance and support in maintaining my health and wellness to prevent hospital readmission/ED visit.  Date Goal set: 9/14/2021  Barriers: Discharged 9/13/2021  Strengths: Strong advocate for self  Date to Achieve By: 3/2022  Patient expressed understanding of goal: Yes  Action steps to achieve this goal:  1. I would like assistance and support in maintaining my health and wellness to prevent hospital readmission/ED visit; quit smoking 9/10/2021 \"cold turkey\" x 2 weeks; Started smoking 10/2021  2. I will follow up with my providers as scheduled/recommended (MRI/ABD in 3/2022)  3. I will take my medications as prescribed  4.   I will discuss, review, schedule and complete recommended overdue health maintenance with my primary care provider  5.   I will contact my care team with " questions, concerns, support needs  6.   I will use the clinic as a resource and I understand I can contact my clinic with 24/7 after hours services available  7.  Care Coordinator will remain available as needed            Intervention/Education provided during outreach:   RNCC called and spoke with patient; introduced self, discussed role of Care Coordination and explained reason for call     Outreach Frequency: monthly    Plan:   -Patient will contact the care team with questions, concerns, support needs   -Patient will use the clinic as a resource and understands (s)he can contact the [facility] with 24/7 after hours services available  -Care Coordinator will remain available as needed  -RNCC will follow up in one month for follow up appointments/recommendations and goal progression    [facility]   Priya Wheat RN, Care Coordinator   [facility]  E-[email or web address]   [phone]

## 2022-02-04 ENCOUNTER — PATIENT OUTREACH (OUTPATIENT)
Dept: CARE COORDINATION | Facility: CLINIC | Age: 67
End: 2022-02-04
Payer: COMMERCIAL

## 2022-02-04 NOTE — PROGRESS NOTES
Clinic Care Coordination Contact  [facility]/Voicemail       Clinical Data: Care Coordinator Outreach  Outreach attempted x 1.  Left message on patient's voicemail with call back information and requested return call.    Plan: Care Coordinator will try to reach patient again in 1 month.     Priya Wheat RN, BSN, PHN  Primary Care / Care Coordinator   [facility]  [email or web address]   [phone]

## 2022-02-26 DIAGNOSIS — E03.9 HYPOTHYROIDISM, UNSPECIFIED TYPE: ICD-10-CM

## 2022-02-28 NOTE — TELEPHONE ENCOUNTER
Routing refill request to provider for review/approval because:  Labs out of range:    TSH   Date Value Ref Range Status   07/13/2021 4.36 (H) 0.40 - 4.00 mU/L Final   06/04/2021 3.48 0.40 - 4.00 mU/L Final       Breanne Lima RN

## 2022-03-01 RX ORDER — LEVOTHYROXINE SODIUM 100 UG/1
TABLET ORAL
Qty: 90 TABLET | Refills: 1 | Status: SHIPPED | OUTPATIENT
Start: 2022-03-01 | End: 2022-08-28

## 2022-03-10 ENCOUNTER — MYC MEDICAL ADVICE (OUTPATIENT)
Dept: INTERNAL MEDICINE | Facility: CLINIC | Age: 67
End: 2022-03-10
Payer: COMMERCIAL

## 2022-03-10 ENCOUNTER — MYC MEDICAL ADVICE (OUTPATIENT)
Dept: PULMONOLOGY | Facility: CLINIC | Age: 67
End: 2022-03-10
Payer: COMMERCIAL

## 2022-03-10 DIAGNOSIS — R76.8 ELEVATED ANTINUCLEAR ANTIBODY (ANA) LEVEL: ICD-10-CM

## 2022-03-10 DIAGNOSIS — Z87.01 HISTORY OF PNEUMONIA: ICD-10-CM

## 2022-03-10 DIAGNOSIS — R93.89 ABNORMAL CHEST CT: Primary | ICD-10-CM

## 2022-03-10 DIAGNOSIS — K86.9 PANCREATIC LESION: Primary | ICD-10-CM

## 2022-03-10 DIAGNOSIS — E03.9 HYPOTHYROIDISM, UNSPECIFIED TYPE: ICD-10-CM

## 2022-03-10 DIAGNOSIS — R76.8 ANTIMITOCHONDRIAL ANTIBODY POSITIVE: ICD-10-CM

## 2022-03-10 DIAGNOSIS — J44.9 CHRONIC OBSTRUCTIVE PULMONARY DISEASE, UNSPECIFIED COPD TYPE (H): ICD-10-CM

## 2022-03-10 NOTE — TELEPHONE ENCOUNTER
Please see mychart from patient and advise appropriate course of action.      Clinton Pate RN  [facility] Triage Nurse

## 2022-03-24 ENCOUNTER — PATIENT OUTREACH (OUTPATIENT)
Dept: NURSING | Facility: CLINIC | Age: 67
End: 2022-03-24
Payer: COMMERCIAL

## 2022-03-24 ASSESSMENT — ACTIVITIES OF DAILY LIVING (ADL): DEPENDENT_IADLS:: INDEPENDENT

## 2022-03-24 NOTE — PROGRESS NOTES
"Clinic Care Coordination Contact    Follow Up Progress Note      Assessment:   Patient asked RNCC to not ask about tobacco cessation due to \"I'm still smoking\" and is having a hard time.  Patient is aware of the patch, gum, and medications to help and would like to quit on her own.    Per patient, there were some scheduling issues with scheduling the follow CT/MRI from her December, 2021 Pulmonary follow up appointment.  Apparently her insurance will not cover the cost of the CT, a Chest Xray was ordered and patient was hoping to have this done at the [facility] due to proximity to her home. RNCC doesn't quite understanding the whole scheduling mix up; however, the Chest Xray/MRI Abd have been scheduled for 5/6/2022 at [facility] and her follow up Pulmonary appointment is schedule for 7/18/2022.    Care Gaps:    Health Maintenance Due   Topic Date Due     COPD ACTION PLAN  Never done     HIV SCREENING  Never done     ADVANCE CARE PLANNING  05/31/2018     MEDICARE ANNUAL WELLNESS VISIT  10/13/2021     COVID-19 Vaccine (3 - Booster for Pfizer series) 10/14/2021     PHQ-2 (once per calendar year)  01/01/2022     ZOSTER IMMUNIZATION (2 of 2) 08/03/2021     Care Gaps Last addressed on 3/24/2022    Goals addressed this encounter:   Goals Addressed                    This Visit's Progress       1. Medical (pt-stated)   70%      Goal Statement: I would like assistance and support in maintaining my health and wellness by completing the recommended overdue health maintenance/care gaps.      Date Goal set: 9/14/2021 // Edited, modified 3/24/2022  Barriers: Discharged 9/13/2021  Strengths: Strong advocate for self  Date to Achieve By: 3/2022 // Edited, modified 9/2022  Patient expressed understanding of goal: Yes  Action steps to achieve this goal:  1. I will try to quit smoking; on 9/10/2021 \"cold turkey\" x 2 weeks; Started back smoking 10/2021  2. I will follow up with my providers as " scheduled/recommended (MRI/ABD in 3/2022-scheduled 5/6/2022)  3. I will take my medications as prescribed  4.   I will discuss, review, schedule and complete recommended overdue health maintenance with my primary care provider  5.   I will contact my care team with questions, concerns, support needs  6.   I will use the clinic as a resource and I understand I can contact my clinic with 24/7 after hours services available  7.  Care Coordinator will remain available as needed          Intervention/Education provided during outreach:   RNCC called and spoke with patient; introduced self, discussed role of Care Coordination and explained reason for call     Plan:   -Patient will contact the care team with questions, concerns, support needs   -Patient will use the clinic as a resource and understands (s)he can contact the [facility] with 24/7 after hours services available  -Care Coordinator will remain available as needed  -RNCC will follow up in 6 weeks for follow up appointments/recommendations and goal progression     Priya Wheat RN, BSN, PHN  Primary Care / Care Coordinator   [facility]  [email or web address] [email or web address]   [phone]

## 2022-03-24 NOTE — LETTER
[address] [facility] [address]    [facility]  Patient Centered Plan of Care  About Me:        Patient Name:  Irene Haile    YOB: 1955  Age:         66 year old   [place] MRN:    [id number] Telephone Information:  Home Phone [phone]   Mobile [phone]       Address:  [address] [place] [address] Email address:  [email or web address]      Emergency Contact(s)    Name Relationship Lgl Grd Work Phone Home Phone Mobile Phone   SRINATH ESCOBEDO Spouse  [phone]           Primary language:  English     needed? Data Unavailable   [place] [employer]:  [phone] op. 1  Other communication barriers:None    Preferred Method of Communication:  Vazquez  Current living arrangement: I live in a private home with spouse    Mobility Status/ Medical Equipment: Independent    Health Maintenance  Health Maintenance Reviewed: Due/Overdue   Health Maintenance Due   Topic Date Due     COPD ACTION PLAN  Never done     HIV SCREENING  Never done     ADVANCE CARE PLANNING  05/31/2018     MEDICARE ANNUAL WELLNESS VISIT  10/13/2021     COVID-19 Vaccine (3 - Booster for Pfizer series) 10/14/2021     PHQ-2 (once per calendar year)  01/01/2022     ZOSTER IMMUNIZATION (2 of 2) 08/03/2021         My Access Plan  Medical Emergency 911   Primary Clinic Line [facility] [phone]   24 Hour Appointment Line [phone] or  [phone]) (toll-free)   24 Hour Nurse Line [phone] (toll-free)   Preferred Urgent Care [facility], [phone]     Preferred Hospital [facility]  [phone]     Preferred Pharmacy [id number]  [place] - [address] AT [place] [address] & [facility]     Behavioral Health Crisis Line The National Suicide Prevention Lifeline at 1-326.215.8018 or 911     My Care Team Members  Patient Care Team        "Relationship Specialty Notifications Start End    Leonardo Angela MD PCP - General Internal Medicine  7/17/10     Phone: [phone] Fax: [phone]         [address] [place] [address]    Leonardo Angela MD Assigned PCP   2/14/21     Phone: [phone] Fax: [phone]         [address] [place] [address]    Priya Wheat, RN Lead Care Coordinator  Admissions 9/14/21     Shikha Riley MD MD Pulmonary Disease  9/29/21     Phone: [phone] Fax: [phone]         [address] [facility] [address] [facility] [address]    Shikha Riley MD Assigned Pulmonology Provider   12/19/21     Phone: [phone] Fax: [phone]         [address] [facility] [address] [address]            My Care Plans  Self Management and Treatment Plan  Goals and (Comments)  Goals        General     1. Medical (pt-stated)      Notes - Note edited  3/24/2022  4:13 PM by Priya Wheat, RN     Goal Statement: I would like assistance and support in maintaining my health and wellness by completing the recommended overdue health maintenance/care gaps.      Date Goal set: 9/14/2021 // Edited, modified 3/24/2022  Barriers: Discharged 9/13/2021  Strengths: Strong advocate for self  Date to Achieve By: 3/2022 // Edited, modified 9/2022  Patient expressed understanding of goal: Yes  Action steps to achieve this goal:  1. I will try to quit smoking; on 9/10/2021 \"cold turkey\" x 2 weeks; Started back smoking 10/2021  2. I will follow up with my providers as scheduled/recommended (MRI/ABD in 3/2022-scheduled 5/6/2022)  3. I will take my medications as prescribed  4.   I will discuss, review, schedule and complete recommended overdue health maintenance with my primary care provider  5.   I will contact my care team with questions, concerns, support needs  6.   I will use the clinic as a resource and I understand I can contact my clinic with 24/7 after hours services available  7.  Care Coordinator will remain available " as needed             Action Plans on File:      Advance Care Plans/Directives Type:   No data recorded    My Medical and Care Information  Problem List   Patient Active Problem List   Diagnosis     Tobacco use disorder     ACP (advance care planning)     COPD (chronic obstructive pulmonary disease) (H)     Hypothyroidism, unspecified type     Hyperlipidemia LDL goal <100     Pancreatic cyst     Positive OCTAVIANO (antinuclear antibody)      Current Medications and Allergies:    Allergies   Allergen Reactions     Augmented Betamethasone Diprop [Betamethasone] GI Disturbance     Augmentin       Vomiting/nausea     Codeine      Insomnia       Nitrofurantoin      Nausea, loss appetite     Shingrix [Zoster Vac Recomb Adjuvanted]      Severe body pain for 3-4 weeks     Current Outpatient Medications   Medication     levothyroxine (SYNTHROID/LEVOTHROID) 100 MCG tablet     umeclidinium-vilanterol (ANORO ELLIPTA) 62.5-25 MCG/INH oral inhaler     No current facility-administered medications for this visit.         Care Coordination Start Date: 9/14/2021   Frequency of Care Coordination: 6 weeks     Form Last Updated: 03/24/2022

## 2022-04-25 ENCOUNTER — PATIENT OUTREACH (OUTPATIENT)
Dept: CARE COORDINATION | Facility: CLINIC | Age: 67
End: 2022-04-25
Payer: COMMERCIAL

## 2022-04-25 ASSESSMENT — ACTIVITIES OF DAILY LIVING (ADL): DEPENDENT_IADLS:: INDEPENDENT

## 2022-04-25 NOTE — PROGRESS NOTES
Clinic Care Coordination Contact  [facility]/Voicemail    Referral Source: IP Report  Clinical Data: Care Coordinator Outreach  Outreach attempted x 1.  Left message on patient's voicemail with call back information and requested return call.    Plan: Care Coordinator will try to reach patient again in 10 business days.     Priya Wheat RN, BSN, PHN  Primary Care / Care Coordinator   [facility]  [email or web address]   [phone]

## 2022-04-28 ENCOUNTER — LAB (OUTPATIENT)
Dept: LAB | Facility: CLINIC | Age: 67
End: 2022-04-28
Payer: COMMERCIAL

## 2022-04-28 DIAGNOSIS — R76.8 ELEVATED ANTINUCLEAR ANTIBODY (ANA) LEVEL: ICD-10-CM

## 2022-04-28 DIAGNOSIS — E03.9 HYPOTHYROIDISM, UNSPECIFIED TYPE: ICD-10-CM

## 2022-04-28 DIAGNOSIS — Z11.4 SCREENING FOR HIV (HUMAN IMMUNODEFICIENCY VIRUS): Primary | ICD-10-CM

## 2022-04-28 DIAGNOSIS — R76.8 ANTIMITOCHONDRIAL ANTIBODY POSITIVE: ICD-10-CM

## 2022-04-28 LAB
CRP SERPL-MCNC: 11 MG/L (ref 0–8)
ERYTHROCYTE [SEDIMENTATION RATE] IN BLOOD BY WESTERGREN METHOD: 12 MM/HR (ref 0–30)

## 2022-04-28 PROCEDURE — 36415 COLL VENOUS BLD VENIPUNCTURE: CPT

## 2022-04-28 PROCEDURE — 84443 ASSAY THYROID STIM HORMONE: CPT

## 2022-04-28 PROCEDURE — 85652 RBC SED RATE AUTOMATED: CPT

## 2022-04-28 PROCEDURE — 80053 COMPREHEN METABOLIC PANEL: CPT

## 2022-04-28 PROCEDURE — 86140 C-REACTIVE PROTEIN: CPT

## 2022-04-28 PROCEDURE — 86381 MITOCHONDRIAL ANTIBODY EACH: CPT

## 2022-04-28 PROCEDURE — 87389 HIV-1 AG W/HIV-1&-2 AB AG IA: CPT

## 2022-04-28 PROCEDURE — 84439 ASSAY OF FREE THYROXINE: CPT

## 2022-04-28 PROCEDURE — 86038 ANTINUCLEAR ANTIBODIES: CPT

## 2022-04-29 ENCOUNTER — PATIENT OUTREACH (OUTPATIENT)
Dept: NURSING | Facility: CLINIC | Age: 67
End: 2022-04-29
Payer: COMMERCIAL

## 2022-04-29 ENCOUNTER — OFFICE VISIT (OUTPATIENT)
Dept: URGENT CARE | Facility: URGENT CARE | Age: 67
End: 2022-04-29

## 2022-04-29 ENCOUNTER — TELEPHONE (OUTPATIENT)
Dept: URGENT CARE | Facility: URGENT CARE | Age: 67
End: 2022-04-29

## 2022-04-29 VITALS
TEMPERATURE: 98 F | RESPIRATION RATE: 20 BRPM | HEART RATE: 78 BPM | OXYGEN SATURATION: 98 % | SYSTOLIC BLOOD PRESSURE: 130 MMHG | DIASTOLIC BLOOD PRESSURE: 88 MMHG

## 2022-04-29 DIAGNOSIS — M25.562 ACUTE PAIN OF LEFT KNEE: Primary | ICD-10-CM

## 2022-04-29 LAB
ALBUMIN SERPL-MCNC: 3.8 G/DL (ref 3.4–5)
ALP SERPL-CCNC: 119 U/L (ref 40–150)
ALT SERPL W P-5'-P-CCNC: 36 U/L (ref 0–50)
ANION GAP SERPL CALCULATED.3IONS-SCNC: 7 MMOL/L (ref 3–14)
AST SERPL W P-5'-P-CCNC: 26 U/L (ref 0–45)
BILIRUB SERPL-MCNC: 0.4 MG/DL (ref 0.2–1.3)
BUN SERPL-MCNC: 16 MG/DL (ref 7–30)
CALCIUM SERPL-MCNC: 9.3 MG/DL (ref 8.5–10.1)
CHLORIDE BLD-SCNC: 107 MMOL/L (ref 94–109)
CO2 SERPL-SCNC: 24 MMOL/L (ref 20–32)
CREAT SERPL-MCNC: 0.65 MG/DL (ref 0.52–1.04)
GFR SERPL CREATININE-BSD FRML MDRD: >90 ML/MIN/1.73M2
GLUCOSE BLD-MCNC: 98 MG/DL (ref 70–99)
POTASSIUM BLD-SCNC: 3.9 MMOL/L (ref 3.4–5.3)
PROT SERPL-MCNC: 7.8 G/DL (ref 6.8–8.8)
SODIUM SERPL-SCNC: 138 MMOL/L (ref 133–144)
T4 FREE SERPL-MCNC: 1.15 NG/DL (ref 0.76–1.46)
TSH SERPL DL<=0.005 MIU/L-ACNC: 7.25 MU/L (ref 0.4–4)

## 2022-04-29 PROCEDURE — 99213 OFFICE O/P EST LOW 20 MIN: CPT | Performed by: FAMILY MEDICINE

## 2022-04-29 ASSESSMENT — ACTIVITIES OF DAILY LIVING (ADL): DEPENDENT_IADLS:: INDEPENDENT

## 2022-04-29 NOTE — PROGRESS NOTES
"Clinic Care Coordination Contact  Care Team Conversations    RNCC received a vm from patient stating her left knee is swollen and has been since before [facility].     RNCC reached out to patient and patient's states that now her left ankle is \"slightly swollen\".  She had lab work yesterday, 4/28/2022 and her CRP is elevated at 11.0.    Patient denies fever, shortness of breath, redness or red line going down her left leg and denies any discharge/oozing from leg.  She is able to bear weight and ambulates with no assistive device.    RNCC with patient on the phone called central scheduling to make an appointment with Dr. Angela and he is booked out to July.  There are no available appointments at the [facility] today and patient will be going to the [facility] around 1 pm today.    RNCC will reach out to patient in 1-2 business days for follow up provider recommendations.     Priya Wheat RN, BSN, PHN  Primary Care / Care Coordinator   [facility]  [email or web address]   [phone]        "

## 2022-04-29 NOTE — PROGRESS NOTES
SUBJECTIVE:  Chief Complaint   Patient presents with     Knee Pain     L knee pain know injury      Irene Haile is a 66 year old female who presents with a chief complaint of gradual-onset, atraumatic swelling (at the posterior and anterior knee) and pain at the left knee.  Symptoms began 1.5 weeks ago.    Context:  Injury:none.   Pain exacerbated by flexion, placing pressure over the medial left knee.   Relieved by nothing.      Patient has not noticed increased warmth at the left knee.    She treated it initially with AsperCream, Ibuprofen      Past Medical History:   Diagnosis Date     Axillary adenopathy 2013     COPD (chronic obstructive pulmonary disease) (H) 2013     Hypothyroidism, unspecified type 2017     Personal history of tobacco use, presenting hazards to health      Pulmonary hemorrhage 2013     UTI (urinary tract infection)      Current Outpatient Medications   Medication Sig Dispense Refill     levothyroxine (SYNTHROID/LEVOTHROID) 100 MCG tablet TAKE 1 TABLET(100 MCG) BY MOUTH DAILY 90 tablet 1     umeclidinium-vilanterol (ANORO ELLIPTA) 62.5-25 MCG/INH oral inhaler Inhale 1 puff into the lungs daily 60 each 11     Social History     Tobacco Use     Smoking status: Current Some Day Smoker     Packs/day: 0.50     Years: 20.00     Pack years: 10.00     Types: Cigarettes     Last attempt to quit: 9/10/2021     Years since quittin.6     Smokeless tobacco: Never Used     Tobacco comment:     Substance Use Topics     Alcohol use: No       ROS:  CONSTITUTIONAL:negative for fevers.  INTEGUMENTARY/SKIN:  Negative for bruising/redness.    MUSCULOSKELETAL: positive for left knee pain.      EXAM:   /88   Pulse 78   Temp 98  F (36.7  C) (Tympanic)   Resp 20   SpO2 98%   M/S Exam:Left knee has minimal edema.  No severe pain with palpation over the left knee.  The tests of all left knee ligaments are within normal limits.   No palpable cysts or masses in the left popliteal  fossa.    No palpable cords/ecchymosis/hematomas.  No increased warmth.      X-RAY was done.  I viewed all X-ray images during this clinic encounter.  The X-rays of the left knee showed no dislocations/avulsions/fractures.  The X-rays did not show a lot of degenerative changes.      ASSESSMENT:  Left Knee Pain.  X-rays did not show arthritis/dislocations/fracture/avulsions.  No pain when testing the knee ligaments.  No increased warmth to suggest septic joint or arthritis.      PLAN:  Follow up with an orthopedic specialist for further evaluation and treatment.      Take Advil for the pain.  Take with food.      Place ice onto the painful areas of the left knee for 20 minutes at a time, every 2-3 hours while awake.         Jordy Swan MD

## 2022-04-29 NOTE — PATIENT INSTRUCTIONS
Follow up with an orthopedic specialist for further evaluation and treatment.      Take Advil for the pain.  Take with food.      Place ice onto the painful areas of the left knee for 20 minutes at a time, every 2-3 hours while awake.      Elevate the left leg above the level of the heart to decrease some of the left knee pain.

## 2022-04-30 NOTE — TELEPHONE ENCOUNTER
SUBJECTIVE:  The radiology report on the patient's April 29, 2022, X-rays of the left knee found mild medial and patellofemoral compartment left knee osteoarthritis.      PLAN:  I called the patient at around 8:25 pm on April 29, 2022, and notified the patient of the radiology report result.  Patient should continue with the Orthopedic specialist visit.      Jordy Swan MD

## 2022-05-02 LAB
ANA SER QL IF: NEGATIVE
HIV 1+2 AB+HIV1 P24 AG SERPL QL IA: NONREACTIVE

## 2022-05-03 LAB — MITOCHONDRIA M2 IGG SER-ACNC: 210 U/ML

## 2022-05-06 ENCOUNTER — HOSPITAL ENCOUNTER (OUTPATIENT)
Dept: GENERAL RADIOLOGY | Facility: CLINIC | Age: 67
Discharge: HOME | End: 2022-05-06
Attending: INTERNAL MEDICINE | Admitting: INTERNAL MEDICINE
Payer: COMMERCIAL

## 2022-05-06 ENCOUNTER — HOSPITAL ENCOUNTER (OUTPATIENT)
Dept: MRI IMAGING | Facility: CLINIC | Age: 67
Discharge: HOME | End: 2022-05-06
Attending: INTERNAL MEDICINE | Admitting: INTERNAL MEDICINE
Payer: COMMERCIAL

## 2022-05-06 ENCOUNTER — OFFICE VISIT (OUTPATIENT)
Dept: ORTHOPEDICS | Facility: CLINIC | Age: 67
End: 2022-05-06
Payer: COMMERCIAL

## 2022-05-06 VITALS
BODY MASS INDEX: 24.19 KG/M2 | HEIGHT: 68 IN | WEIGHT: 159.6 LBS | DIASTOLIC BLOOD PRESSURE: 70 MMHG | SYSTOLIC BLOOD PRESSURE: 124 MMHG

## 2022-05-06 DIAGNOSIS — M17.12 PRIMARY OSTEOARTHRITIS OF LEFT KNEE: Primary | ICD-10-CM

## 2022-05-06 DIAGNOSIS — M25.562 ACUTE PAIN OF LEFT KNEE: ICD-10-CM

## 2022-05-06 DIAGNOSIS — M85.862 OSTEOPENIA OF LEFT LOWER LEG: ICD-10-CM

## 2022-05-06 DIAGNOSIS — K86.9 PANCREATIC LESION: ICD-10-CM

## 2022-05-06 DIAGNOSIS — R93.89 ABNORMAL CHEST CT: ICD-10-CM

## 2022-05-06 DIAGNOSIS — Z87.01 HISTORY OF PNEUMONIA: ICD-10-CM

## 2022-05-06 PROCEDURE — 71046 X-RAY EXAM CHEST 2 VIEWS: CPT

## 2022-05-06 PROCEDURE — 74183 MRI ABD W/O CNTR FLWD CNTR: CPT

## 2022-05-06 PROCEDURE — 255N000002 HC RX 255 OP 636: Performed by: INTERNAL MEDICINE

## 2022-05-06 PROCEDURE — A9585 GADOBUTROL INJECTION: HCPCS | Performed by: INTERNAL MEDICINE

## 2022-05-06 PROCEDURE — 99203 OFFICE O/P NEW LOW 30 MIN: CPT | Performed by: ORTHOPAEDIC SURGERY

## 2022-05-06 RX ORDER — GADOBUTROL 604.72 MG/ML
7.5 INJECTION INTRAVENOUS ONCE
Status: COMPLETED | OUTPATIENT
Start: 2022-05-06 | End: 2022-05-06

## 2022-05-06 RX ADMIN — GADOBUTROL 7.5 ML: 604.72 INJECTION INTRAVENOUS at 10:27

## 2022-05-06 ASSESSMENT — KOOS JR
BENDING TO THE FLOOR TO PICK UP OBJECT: MODERATE
KOOS JR SCORING: 70.7
GOING UP OR DOWN STAIRS: MILD
STRAIGHTENING KNEE FULLY: MILD
RISING FROM SITTING: MILD
TWISING OR PIVOTING ON KNEE: MILD

## 2022-05-06 NOTE — PROGRESS NOTES
HISTORY OF PRESENT ILLNESS:    Irene Haile is a 66 year old female who is seen in consultation at the request of Dr. Swan for acute left knee pain. Onset of pain ~3 weeks.  She reports no injury.  Patient currently work from home.     Present symptoms: left knee pain located to the medial and posterior aspect of the knee. She reports initially pain was similar to a tooth ache, but has gradually improved. She notes acutely her knee was swollen, and has mildly improved. No redness or warmth.  She reports sensation of buckling in the knee when pain was most severe.   Current pain level: 3/10, Worst pain level: 9/10   Treatments tried to this point: Aspercreme, Ibuprofen, icing, and elevation at nighttime patient has discontinued treatment over the last couple days.   Orthopedic PMH: none     Past Medical History:   Diagnosis Date     Axillary adenopathy 2013     COPD (chronic obstructive pulmonary disease) (H) 2013     Hypothyroidism, unspecified type 2017     Personal history of tobacco use, presenting hazards to health      Pulmonary hemorrhage 2013     UTI (urinary tract infection)        Past Surgical History:   Procedure Laterality Date     ZZC NONSPECIFIC PROCEDURE   2010    Laparoscopic appendectomy     ZZC TOTAL ABDOM HYSTERECTOMY       BSO       Family History   Problem Relation Age of Onset     Diabetes Brother         Diag. age 41     Breast Cancer Mother      Cancer Mother         All over her body     Alcohol/Drug Father         Has Liver Problems     Cancer - colorectal Brother         before age 50     Cancer Brother         liver cancer      Family History Negative Brother         ulcerative colitis     Family History Negative Sister      Sjogren's Daughter        Social History     Socioeconomic History     Marital status:      Spouse name: Larry     Number of children: 2     Years of education: 15     Highest education level: Not on file   Occupational  History     Occupation: Clerical     Employer: [employer]    Tobacco Use     Smoking status: Current Some Day Smoker     Packs/day: 0.50     Years: 20.00     Pack years: 10.00     Types: Cigarettes     Last attempt to quit: 9/10/2021     Years since quittin.6     Smokeless tobacco: Never Used     Tobacco comment:     Substance and Sexual Activity     Alcohol use: No     Drug use: No     Sexual activity: Yes     Partners: Male     Birth control/protection: Surgical     Comment: hysterectomy-   Other Topics Concern     Parent/sibling w/ CABG, MI or angioplasty before 65F 55M? Not Asked   Social History Narrative     Not on file     Social Determinants of Health     Financial Resource Strain: Low Risk      Difficulty of Paying Living Expenses: Not very hard   Food Insecurity: No Food Insecurity     Worried About Running Out of Food in the Last Year: Never true     Ran Out of Food in the Last Year: Never true   Transportation Needs: No Transportation Needs     Lack of Transportation (Medical): No     Lack of Transportation (Non-Medical): No   Physical Activity: Not on file   Stress: Stress Concern Present     Feeling of Stress : To some extent   Social Connections: Moderately Isolated     Frequency of Communication with Friends and Family: More than three times a week     Frequency of Social Gatherings with Friends and Family: Once a week     Attends Mosque Services: Never     Active Member of Clubs or Organizations: No     Attends Club or Organization Meetings: Never     Marital Status:    Intimate Partner Violence: Not on file   Housing Stability: Low Risk      Unable to Pay for Housing in the Last Year: No     Number of Places Lived in the Last Year: 1     Unstable Housing in the Last Year: No       Current Outpatient Medications   Medication Sig Dispense Refill     levothyroxine (SYNTHROID/LEVOTHROID) 100 MCG tablet TAKE 1 TABLET(100 MCG) BY MOUTH DAILY 90 tablet 1     umeclidinium-vilanterol  "(ANORO ELLIPTA) 62.5-25 MCG/INH oral inhaler Inhale 1 puff into the lungs daily 60 each 11       Allergies   Allergen Reactions     Augmented Betamethasone Diprop [Betamethasone] GI Disturbance     Augmentin       Vomiting/nausea     Codeine      Insomnia       Nitrofurantoin      Nausea, loss appetite     Shingrix [Zoster Vac Recomb Adjuvanted]      Severe body pain for 3-4 weeks       REVIEW OF SYSTEMS:  CONSTITUTIONAL:  NEGATIVE for fever, chills, change in weight  INTEGUMENTARY/SKIN:  NEGATIVE for worrisome rashes, moles or lesions  EYES:  NEGATIVE for vision changes or irritation  ENT/MOUTH:  NEGATIVE for ear, mouth and throat problems  RESP:  Blood in sputum  BREAST:  NEGATIVE for masses, tenderness or discharge  CV:  NEGATIVE for chest pain, palpitations or peripheral edema  GI:  NEGATIVE for nausea, abdominal pain, heartburn, or change in bowel habits  :  Negative   MUSCULOSKELETAL:  See HPI above  NEURO:  NEGATIVE for weakness, dizziness or paresthesias  ENDOCRINE:  NEGATIVE for temperature intolerance, skin/hair changes  HEME/ALLERGY/IMMUNE:  NEGATIVE for bleeding problems  PSYCHIATRIC:  NEGATIVE for changes in mood or affect      PHYSICAL EXAM:  /70 (BP Location: Right arm, Patient Position: Chair, Cuff Size: Adult Regular)   Ht 1.727 m (5' 8\")   Wt 72.4 kg (159 lb 9.6 oz)   BMI 24.27 kg/m    Body mass index is 24.27 kg/m .   GENERAL APPEARANCE: healthy, alert and no distress   HEENT: No apparent thyroid megaly. Clear sclera with normal ocular movement  RESPIRATORY: No labored breathing  SKIN: no suspicious lesions or rashes  NEURO: Normal strength and tone, mentation intact and speech normal  VASCULAR: Good pulses, and capillary refill   LYMPH: no lymphadenopathy   PSYCH:  mentation appears normal and affect normal/bright    MUSCULOSKELETAL:  Not In acute distress  Able to get up from sitting readily  Noted noticeable limp noted when she walks   slight swelling of the left compared to " right  Minimal lack of extension, left, about 2 degrees  Right knee with full extension  both knees with patellofemoral crepitus  Pain with patellofemoral compression, more so on the right  Minimal pain with patellofemoral compression, leg  No specific medial joint line pain since   intact ligaments throughout  Intact motor strength throughout  Skin is intact  Sensation is intact  Circulation is intact    No popliteal mass noted  No swelling of the calf noted                   ASSESSMENT:  Improving acute flareup related pain secondary to osteoarthritis of the patellofemoral joint, left knee   Asymptomatic patellofemoral DJD, right. Knee    PLAN:  We visualized the images of the x-rays of the knee April 29, 2022.  Mild degenerative changes at the patellofemoral joint with a subchondral sclerosis and minimal medial joint space narrowing were pointed out.  Presence of minimal chondrocalcinosis especially in the lateral joint space was pointed out as well.  In addition, she does have diffuse osteopenia in the femur and tibia.  We recommended her to follow-up with her primary care for consideration of bone density study and possible medications.      Plan we had a long discussion about the nature of osteoarthritis.      Clinical presentation was explained.  General pattern of symptoms from osteoarthritis was explained.    Given the fact that she is getting better spontaneously, no immediate intervention was felt to be necessary other than further observation with appropriate activity modifications, over-the-counter medications, icing and helpful exercises.  Information material was provided.    All the questions were answered.  She can follow-up as needed.        Imaging Interpretation:     Recent Results (from the past 744 hour(s))   XR Knee Left 3 Views    Narrative    KNEE LEFT THREE VIEWS  DATE/TIME: 4/29/2022 1:53 PM    INDICATION: Patient complains of 1.5 weeks of worsening swelling and  pain at the left knee. No  obvious injury. Acute pain of left knee.    COMPARISON: None available.      Impression    IMPRESSION: Mild medial and patellofemoral compartment left knee  osteoarthritis. No acute left knee fracture or dislocation. Small left  knee joint effusion. Arterial calcification. Diffuse bone  demineralization. No significant anterior knee soft tissue swelling.     SHAHNAZ TRACY MD         SYSTEM ID:  [id number]           Juan Schneider MD  Department of Orthopedic Surgery        Disclaimer: This note consists of symbols derived from keyboarding, dictation and/or voice recognition software. As a result, there may be errors in the script that have gone undetected. Please consider this when interpreting information found in this chart.

## 2022-05-06 NOTE — PATIENT INSTRUCTIONS
"We discussed the problem of arthritis today. Arthritis means that the joint surfaces that were once smooth are getting rough. When the rough joint surfaces are loaded and gliding, you often have pain, swelling, catching/popping, and locking type of symptoms. It can be episodic or constant. Even though the process is slow developing and chronic in nature, the symptoms can come on rather suddenly sometimes triggered by an injury or at other times without any identifiable event.  Typically, even with arthritis, most people can  function quite well with a common sense management which include: activity modifications, OTC medications (e.g.. Acetaminophen and Ibuprofen or Naproxen), icing, stretching and muscle strengthening. In general, staying active helps because it will help maintaining joint flexibility and muscle strength although \"overdoing\" and doing repetitively loading activities could worsen the symptoms. If you carry extra weight, losing weight should be a part of management of arthritis. I recommend diet along with gentle aerobic exercises such as walking, elliptical, swimming and stationary biking. Activities like jumping, pivoting, squatting, lunging, stair climbing, and kneeling are better to be avoided.  You can also try over- the- counter braces (especially for the knee arthritis) but since the problem is an internal issue, it is not always helpful.  Formal PT is not always necessary but can be helpful if you are not sure about what exercises to do. A discussion with a dietician can be very helpful if you decided to include weight loss as a part of the treatment.  If these measures are not effective, we often resort to the injection treatment, either cortisone or viscous joint supplement. It has been shown that viscous joint injections are not very effective if arthritis is advanced and at this point is approved only for knee arthritis. The potential benefit from injections are not permanent and for that " Visit Information    Have you changed or started any medications since your last visit including any over-the-counter medicines, vitamins, or herbal medicines? no   Are you having any side effects from any of your medications? -  no  Have you stopped taking any of your medications? Is so, why? -  no    Have you seen any other physician or provider since your last visit? Dr. Zenon Doss  Urology   Have you had any other diagnostic tests since your last visit? No  Have you been seen in the emergency room and/or had an admission to a hospital since we last saw you? No  Have you had your routine dental cleaning in the past 6 months? no    Have you activated your [employer] account? If not, what are your barriers?  Yes     Patient Care Team:  Chikis Echols MD as PCP - General (Internal Medicine)    Medical History Review  Past Medical, Family, and Social History reviewed and does not contribute to the patient presenting condition    Health Maintenance   Topic Date Due    Shingles Vaccine (1 of 2 - 2 Dose Series) 04/27/2011    Diabetic retinal exam  10/30/2018    DTaP/Tdap/Td vaccine (1 - Tdap) 01/24/2027 (Originally 9/17/2016)    Diabetic foot exam  04/12/2019    Diabetic microalbuminuria test  07/12/2019    Lipid screen  07/12/2019    Breast cancer screen  08/07/2019    A1C test (Diabetic or Prediabetic)  10/18/2019    Colon cancer screen colonoscopy  05/18/2027    Flu vaccine  Completed    Pneumococcal med risk  Completed    Hepatitis C screen  Completed    HIV screen  Completed "reason they can be repeated at a reasonable frequency. The duration of benefit is impossible to predict. Sometimes, it does not provide any noticeable benefit at all.  As you can imagine, surgical intervention is not the first line of management. It is important to remember that even with surgery one cannot cure arthritis unless joint replacement is considered. As was the case with the injection treatment, the response from arthroscopic surgeries  is unpredictable since we cannot make the joint surfaces back to perfectly smooth. It would be an option for someone who has tried all appropriate non-operative treatment modalities and is not quite ready for replacement type of permanent procedure. Arthroscopic procedures are more applicable to the knees, the shoulders and the ankles as opposed to the hips and fingers.         The diagnosis of \"patella-femoral pain syndrome\" or \"patella-femoral dysfunction\" is a broad inclusive condition that describes pain localized in  the front aspect of a knee. These terms are often interchanged with \"patella chondromalacia\" although chondromalacia specifically refers to softening of  joint surface cartilage. Often, pain is caused by chondromalacia or early arthritis, however, one can have pain even though there are no apparent damages that can be identified on the joint surfaces. In this case the pain is most likely due to mal-tracking of the patella on the femur. In this case, the pressure on the knee cap is not evenly distributed and some part of the patella gets overloaded and thus the pain. This problem is not always easy to identify because mal-tracking is happening only when one is engaged in an activity such as jumping, running or pivoting. Static examination can be totally normal. There are many potential causes for the mal-tracking: prior injuries, knee mis-alignment, muscle imbalance/weakness, in-toeing gait, hyper-flexibility and shallow groove for the patella.    Pain is " typically noted with kneeling, squatting, climbing, going up and down steps and long drives. At an early stage, pain is worse in the late afternoon or in the evening not while one is doing the activities. One can feel and hear cracking, popping and grinding with these activities.   Pain is very often felt in the back of the knee even though the pathology is in the front. This is because of muscle/tendon tightness/spasm resulting from protective compensation by hamstring structures.     Obesity is a big contributing factor to pain related to this issue. The stress under the patella is not in an one-to-one relationship. In other words, 10 Lbs extra weight is not 10 Lbs extra pressure in the patella-femoral joint. With regular activities, the extra pressure in the patella-femoral area is easily 20-40 Lbs. Thus, lies the importance of reducing weight if you carry extra weight.     As far as treatment goes, usual RIM (rest, ice, and medication) should be tried first. Patella sleeve is often helpful but not always. In general, gliding activities such as stationary biking and elliptical are better than the activities that put extra stress to the patella-femoral joint space. These include jumping, lunging, stair climbing, hiking, pivoting and kneeling etc.    In order to better control the movement of the patella on the femur, strong quadriceps muscles are very important. One can have an imbalance of the quad muscle: stronger outer quad (lateralis) compared to inner quad (medialis). In this situation, strengthening of the inner thigh muscle should be main emphasis of the treatment. Working with a physical therapist is often utilized for this purpose.    If all appropriate non-surgical options did not result in satisfactory pain relief, surgery can be considered. Unfortunately, not all surgeries result in desired outcome. Depending on the type of operation, healing process is widely different: a couple of weeks to several  months. One should remember surgery for this particular diagnosis should not be the first line of treatment armamentarium.

## 2022-05-06 NOTE — LETTER
2022         RE: Irene Haile  [address]  [place] [address]        Dear Colleague,    Thank you for referring your patient, Irene Haile, to the [facility] [place]. Please see a copy of my visit note below.    HISTORY OF PRESENT ILLNESS:    Irene Haile is a 66 year old female who is seen in consultation at the request of Dr. Swan for acute left knee pain. Onset of pain ~3 weeks.  She reports no injury.  Patient currently work from home.     Present symptoms: left knee pain located to the medial and posterior aspect of the knee. She reports initially pain was similar to a tooth ache, but has gradually improved. She notes acutely her knee was swollen, and has mildly improved. No redness or warmth.  She reports sensation of buckling in the knee when pain was most severe.   Current pain level: 3/10, Worst pain level: 9/10   Treatments tried to this point: Aspercreme, Ibuprofen, icing, and elevation at nighttime patient has discontinued treatment over the last couple days.   Orthopedic PMH: none     Past Medical History:   Diagnosis Date     Axillary adenopathy 2013     COPD (chronic obstructive pulmonary disease) (H) 2013     Hypothyroidism, unspecified type 2017     Personal history of tobacco use, presenting hazards to health      Pulmonary hemorrhage 2013     UTI (urinary tract infection)        Past Surgical History:   Procedure Laterality Date     ZZC NONSPECIFIC PROCEDURE   2010    Laparoscopic appendectomy     ZZC TOTAL ABDOM HYSTERECTOMY       BSO       Family History   Problem Relation Age of Onset     Diabetes Brother         Diag. age 41     Breast Cancer Mother      Cancer Mother         All over her body     Alcohol/Drug Father         Has Liver Problems     Cancer - colorectal Brother         before age 50     Cancer Brother         liver cancer      Family History Negative Brother         ulcerative  colitis     Family History Negative Sister      Karuna's Daughter        Social History     Socioeconomic History     Marital status:      Spouse name: Larry     Number of children: 2     Years of education: 15     Highest education level: Not on file   Occupational History     Occupation: Clerical     Employer: [employer]    Tobacco Use     Smoking status: Current Some Day Smoker     Packs/day: 0.50     Years: 20.00     Pack years: 10.00     Types: Cigarettes     Last attempt to quit: 9/10/2021     Years since quittin.6     Smokeless tobacco: Never Used     Tobacco comment:     Substance and Sexual Activity     Alcohol use: No     Drug use: No     Sexual activity: Yes     Partners: Male     Birth control/protection: Surgical     Comment: hysterectomy-   Other Topics Concern     Parent/sibling w/ CABG, MI or angioplasty before 65F 55M? Not Asked   Social History Narrative     Not on file     Social Determinants of Health     Financial Resource Strain: Low Risk      Difficulty of Paying Living Expenses: Not very hard   Food Insecurity: No Food Insecurity     Worried About Running Out of Food in the Last Year: Never true     Ran Out of Food in the Last Year: Never true   Transportation Needs: No Transportation Needs     Lack of Transportation (Medical): No     Lack of Transportation (Non-Medical): No   Physical Activity: Not on file   Stress: Stress Concern Present     Feeling of Stress : To some extent   Social Connections: Moderately Isolated     Frequency of Communication with Friends and Family: More than three times a week     Frequency of Social Gatherings with Friends and Family: Once a week     Attends Mormon Services: Never     Active Member of Clubs or Organizations: No     Attends Club or Organization Meetings: Never     Marital Status:    Intimate Partner Violence: Not on file   Housing Stability: Low Risk      Unable to Pay for Housing in the Last Year: No     Number of Places  "Lived in the Last Year: 1     Unstable Housing in the Last Year: No       Current Outpatient Medications   Medication Sig Dispense Refill     levothyroxine (SYNTHROID/LEVOTHROID) 100 MCG tablet TAKE 1 TABLET(100 MCG) BY MOUTH DAILY 90 tablet 1     umeclidinium-vilanterol (ANORO ELLIPTA) 62.5-25 MCG/INH oral inhaler Inhale 1 puff into the lungs daily 60 each 11       Allergies   Allergen Reactions     Augmented Betamethasone Diprop [Betamethasone] GI Disturbance     Augmentin       Vomiting/nausea     Codeine      Insomnia       Nitrofurantoin      Nausea, loss appetite     Shingrix [Zoster Vac Recomb Adjuvanted]      Severe body pain for 3-4 weeks       REVIEW OF SYSTEMS:  CONSTITUTIONAL:  NEGATIVE for fever, chills, change in weight  INTEGUMENTARY/SKIN:  NEGATIVE for worrisome rashes, moles or lesions  EYES:  NEGATIVE for vision changes or irritation  ENT/MOUTH:  NEGATIVE for ear, mouth and throat problems  RESP:  Blood in sputum  BREAST:  NEGATIVE for masses, tenderness or discharge  CV:  NEGATIVE for chest pain, palpitations or peripheral edema  GI:  NEGATIVE for nausea, abdominal pain, heartburn, or change in bowel habits  :  Negative   MUSCULOSKELETAL:  See HPI above  NEURO:  NEGATIVE for weakness, dizziness or paresthesias  ENDOCRINE:  NEGATIVE for temperature intolerance, skin/hair changes  HEME/ALLERGY/IMMUNE:  NEGATIVE for bleeding problems  PSYCHIATRIC:  NEGATIVE for changes in mood or affect      PHYSICAL EXAM:  /70 (BP Location: Right arm, Patient Position: Chair, Cuff Size: Adult Regular)   Ht 1.727 m (5' 8\")   Wt 72.4 kg (159 lb 9.6 oz)   BMI 24.27 kg/m    Body mass index is 24.27 kg/m .   GENERAL APPEARANCE: healthy, alert and no distress   HEENT: No apparent thyroid megaly. Clear sclera with normal ocular movement  RESPIRATORY: No labored breathing  SKIN: no suspicious lesions or rashes  NEURO: Normal strength and tone, mentation intact and speech normal  VASCULAR: Good pulses, and " capillary refill   LYMPH: no lymphadenopathy   PSYCH:  mentation appears normal and affect normal/bright    MUSCULOSKELETAL:  Not In acute distress  Able to get up from sitting readily  Noted noticeable limp noted when she walks   slight swelling of the left compared to right  Minimal lack of extension, left, about 2 degrees  Right knee with full extension  both knees with patellofemoral crepitus  Pain with patellofemoral compression, more so on the right  Minimal pain with patellofemoral compression, leg  No specific medial joint line pain since   intact ligaments throughout  Intact motor strength throughout  Skin is intact  Sensation is intact  Circulation is intact    No popliteal mass noted  No swelling of the calf noted                   ASSESSMENT:  Improving acute flareup related pain secondary to osteoarthritis of the patellofemoral joint, left knee   Asymptomatic patellofemoral DJD, right. Knee    PLAN:  We visualized the images of the x-rays of the knee April 29, 2022.  Mild degenerative changes at the patellofemoral joint with a subchondral sclerosis and minimal medial joint space narrowing were pointed out.  Presence of minimal chondrocalcinosis especially in the lateral joint space was pointed out as well.  In addition, she does have diffuse osteopenia in the femur and tibia.  We recommended her to follow-up with her primary care for consideration of bone density study and possible medications.      Plan we had a long discussion about the nature of osteoarthritis.      Clinical presentation was explained.  General pattern of symptoms from osteoarthritis was explained.    Given the fact that she is getting better spontaneously, no immediate intervention was felt to be necessary other than further observation with appropriate activity modifications, over-the-counter medications, icing and helpful exercises.  Information material was provided.    All the questions were answered.  She can follow-up as  needed.        Imaging Interpretation:     Recent Results (from the past 744 hour(s))   XR Knee Left 3 Views    Narrative    KNEE LEFT THREE VIEWS  DATE/TIME: 4/29/2022 1:53 PM    INDICATION: Patient complains of 1.5 weeks of worsening swelling and  pain at the left knee. No obvious injury. Acute pain of left knee.    COMPARISON: None available.      Impression    IMPRESSION: Mild medial and patellofemoral compartment left knee  osteoarthritis. No acute left knee fracture or dislocation. Small left  knee joint effusion. Arterial calcification. Diffuse bone  demineralization. No significant anterior knee soft tissue swelling.     SHAHNAZ TRACY MD         SYSTEM ID:  [id number]           Juan Schneider MD  Department of Orthopedic Surgery        Disclaimer: This note consists of symbols derived from keyboarding, dictation and/or voice recognition software. As a result, there may be errors in the script that have gone undetected. Please consider this when interpreting information found in this chart.          Again, thank you for allowing me to participate in the care of your patient.        Sincerely,        Juan Schneider MD

## 2022-05-17 ENCOUNTER — MYC MEDICAL ADVICE (OUTPATIENT)
Dept: INTERNAL MEDICINE | Facility: CLINIC | Age: 67
End: 2022-05-17

## 2022-06-02 ENCOUNTER — PATIENT OUTREACH (OUTPATIENT)
Dept: CARE COORDINATION | Facility: CLINIC | Age: 67
End: 2022-06-02
Payer: COMMERCIAL

## 2022-06-02 ASSESSMENT — ACTIVITIES OF DAILY LIVING (ADL): DEPENDENT_IADLS:: INDEPENDENT

## 2022-06-02 NOTE — PROGRESS NOTES
Clinic Care Coordination Contact  [facility]/Voicemail    Referral Source: Care Team  Clinical Data: Care Coordinator Outreach  Outreach attempted x 1.  Left message on patient's voicemail with call back information and requested return call.  RNCC received a vm from patient is wondering if it's okay to take Vitamin D/Calcium supplement.  Per vm, patient also reached out to PCP/team and has not heard from them.  RNCC left message that the above supplements support bone and muscle; PCP/team will advise.    Plan: Care Coordinator will try to reach patient again in 1 month.     Priya Wheat RN, BSN, PHN  Primary Care / Care Coordinator   [facility]  [email or web address]   [phone]

## 2022-06-02 NOTE — LETTER
[address] [facility] [address]    [facility]  Patient Centered Plan of Care  About Me:        Patient Name:  Irene Haile    YOB: 1955  Age:         66 year old   [place] MRN:    [id number] Telephone Information:  Home Phone [phone]   Mobile [phone]       Address:  [address] [place] [address] Email address:  [email or web address]      Emergency Contact(s)    Name Relationship Lgl Grd Work Phone Home Phone Mobile Phone   SRINATH ESCOBEDO Spouse  [phone]           Primary language:  English     needed? No   [employer]:  [phone] op. 1  Other communication barriers:None    Preferred Method of Communication:  Phone  Current living arrangement: I live in a private home with spouse    Mobility Status/ Medical Equipment: Independent    Health Maintenance  Health Maintenance Reviewed: Due/Overdue   Health Maintenance Due   Topic Date Due     COPD ACTION PLAN  Never done     ADVANCE CARE PLANNING  05/31/2018     MEDICARE ANNUAL WELLNESS VISIT  10/13/2021     PHQ-2 (once per calendar year)  01/01/2022     DEXA  04/26/2022     ZOSTER IMMUNIZATION (2 of 2) 08/03/2021           My Access Plan  Medical Emergency 911   Primary Clinic Line [facility] [phone]   24 Hour Appointment Line [phone] or  [phone]) (toll-free)   24 Hour Nurse Line [phone] (toll-free)   Preferred Urgent Care [facility], [phone]     Preferred Hospital [facility]  [phone]     Preferred Pharmacy [id number] - [place], [place] - [address] AT [place] [address] & [facility]     Behavioral Health Crisis Line The National Suicide Prevention Lifeline at 1-471.805.7015 or 911     My Care Team Members  Patient Care Team       Relationship Specialty Notifications Start End    Veum, Leonardo KULKARNI MD PCP - General Internal  "Medicine  7/17/10     Phone: [phone] Fax: [phone]         [address] [place] [address]    Leonardo Angela MD Assigned PCP   2/14/21     Phone: [phone] Fax: [phone]         [address] [place] [address]    Priya Wheat RN Lead Care Coordinator  Admissions 9/14/21     Shikha Riley MD MD Pulmonary Disease  9/29/21     Phone: [phone] Fax: [phone]         [address] [facility] [address] [facility] [address]    Shikha Riley MD Assigned Pulmonology Provider   12/19/21     Phone: [phone] Fax: [phone]         [address] [facility] [address] [facility] [address]    Juna Schneider MD Assigned Musculoskeletal Provider   5/15/22     Phone: [phone] Fax: [phone]         [address] [place] [address]            My Care Plans  Self Management and Treatment Plan  Goals and (Comments)   Goals        General     1. Medical (pt-stated)      Notes - Note edited  3/24/2022  4:13 PM by Priya Wheat, RN     Goal Statement: I would like assistance and support in maintaining my health and wellness by completing the recommended overdue health maintenance/care gaps.      Date Goal set: 9/14/2021 // Edited, modified 3/24/2022  Barriers: Discharged 9/13/2021  Strengths: Strong advocate for self  Date to Achieve By: 3/2022 // Edited, modified 9/2022  Patient expressed understanding of goal: Yes  Action steps to achieve this goal:  1. I will try to quit smoking; on 9/10/2021 \"cold turkey\" x 2 weeks; Started back smoking 10/2021  2. I will follow up with my providers as scheduled/recommended (MRI/ABD in 3/2022-scheduled 5/6/2022)  3. I will take my medications as prescribed  4.   I will discuss, review, schedule and complete recommended overdue health maintenance with my primary care provider  5.   I will contact my care team with questions, concerns, support needs  6.   I will use the clinic as a resource and I understand I can contact my clinic with " 24/7 after hours services available  7.  Care Coordinator will remain available as needed               Action Plans on File:     Advance Care Plans/Directives Type:   -- (Full Code)      My Medical and Care Information  Problem List   Patient Active Problem List   Diagnosis     Tobacco use disorder     ACP (advance care planning)     COPD (chronic obstructive pulmonary disease) (H)     Hypothyroidism, unspecified type     Hyperlipidemia LDL goal <100     Pancreatic cyst     Positive OCTAVIANO (antinuclear antibody)      Current Medications and Allergies:    Allergies   Allergen Reactions     Augmented Betamethasone Diprop [Betamethasone] GI Disturbance     Augmentin       Vomiting/nausea     Codeine      Insomnia       Nitrofurantoin      Nausea, loss appetite     Shingrix [Zoster Vac Recomb Adjuvanted]      Severe body pain for 3-4 weeks     Current Outpatient Medications   Medication     levothyroxine (SYNTHROID/LEVOTHROID) 100 MCG tablet     umeclidinium-vilanterol (ANORO ELLIPTA) 62.5-25 MCG/INH oral inhaler     No current facility-administered medications for this visit.     Care Coordination Start Date: 9/14/2021   Frequency of Care Coordination: monthly     Form Last Updated: 06/02/2022

## 2022-06-28 ENCOUNTER — PATIENT OUTREACH (OUTPATIENT)
Dept: CARE COORDINATION | Facility: CLINIC | Age: 67
End: 2022-06-28

## 2022-06-28 ASSESSMENT — ACTIVITIES OF DAILY LIVING (ADL): DEPENDENT_IADLS:: INDEPENDENT

## 2022-06-28 NOTE — PROGRESS NOTES
Clinic Care Coordination Contact  [facility]/Voicemail    Referral Source: Care Team  Clinical Data: Care Coordinator Outreach  Outreach attempted x 2.  Left message on patient's voicemail with call back information and requested return call.    Plan: Care Coordinator will try to reach patient again in 10 business days.     Priya Wheat RN, BSN, PHN  Primary Care / Care Coordinator   [facility]  [email or web address]   [phone]

## 2022-07-07 ENCOUNTER — TELEPHONE (OUTPATIENT)
Dept: INTERNAL MEDICINE | Facility: CLINIC | Age: 67
End: 2022-07-07

## 2022-07-07 NOTE — TELEPHONE ENCOUNTER
Peer to Peer Request     Patient Name: Irene Haile   : 1955   MRN: [id number]         Dr. Angela,     The authorization for procedure (description) MR ABDOMEN on date of service  has been denied. We were unsuccessful in obtaining approval through clinical review. A [id number] review can be done by calling the insurance/third party authorization vendor with the following information:     Insurance: [employer]   Auth Vendor: [employer]   Phone: [phone]   Due: As soon as you are able     Clinicals already Provided   Order: YES 3/10   Visit Notes: 21   Results: MR ABDOMEN 9/10   Other: N/A     Patient ID: [id number]   Case/Ref #: [id number]     Patient contact: Yes   Patient response: Will notify today   Patient Phone #: [phone]     Denial Reason: Follow up imaging is only supported one year after your cyst (sac filled with fluid) was found, then once per year for two more years, and then once per two years for four more years if stable.   Your records do not describe this imaging time frame.       If you feel you have additional clinical information that could get this denial overturned, please complete the Peer to Peer.   If you choose not to do the P2P, please let me know as soon as possible so that we can reach out to the patient and advise them of their denial.       Thank you,     [facility] [place]

## 2022-07-12 ENCOUNTER — PATIENT OUTREACH (OUTPATIENT)
Dept: NURSING | Facility: CLINIC | Age: 67
End: 2022-07-12

## 2022-07-12 ASSESSMENT — ACTIVITIES OF DAILY LIVING (ADL): DEPENDENT_IADLS:: INDEPENDENT

## 2022-07-12 NOTE — PROGRESS NOTES
"Clinic Care Coordination Contact    Follow Up Progress Note      Assessment:   Patient states she's at work and doesn't have much time to talk.  Per patient, she has not quit smoking at this time and has not used lozenges, patches or medication to help.  Patient states, \"Shikha is not going to like to hear that\".  Patient has a scheduled appointment on 7/18/2022 with Shikha Watson in [place].  Patient needed to end the call to help a co-worker.    Care Gaps:    Health Maintenance Due   Topic Date Due     COPD ACTION PLAN  Never done     ADVANCE CARE PLANNING  05/31/2018     MEDICARE ANNUAL WELLNESS VISIT  10/13/2021     PHQ-2 (once per calendar year)  01/01/2022     DEXA  04/26/2022     ZOSTER IMMUNIZATION (2 of 2) 08/03/2021     LUNG CANCER SCREENING  09/10/2022     Postponed to next RNCC outreach      Goals addressed this encounter:    Goals Addressed                    This Visit's Progress       1. Medical (pt-stated)   10%      Goal Statement: I would like assistance and support in maintaining my health and wellness by completing the recommended overdue health maintenance/care gaps.      Date Goal set: 9/14/2021 // Edited, modified 3/24/2022  Barriers: Discharged 9/13/2021  Strengths: Strong advocate for self  Date to Achieve By: 3/2022 // Edited, modified 9/2022  Patient expressed understanding of goal: Yes  Action steps to achieve this goal:  1. I will try to quit smoking; on 9/10/2021 \"cold turkey\" x 2 weeks; Started back smoking 10/2021  2. I will follow up with my providers as scheduled/recommended (MRI/ABD in 3/2022-scheduled 5/6/2022)  3. I will take my medications as prescribed  4.   I will discuss, review, schedule and complete recommended overdue health maintenance with my primary care provider  5.   I will contact my care team with questions, concerns, support needs  6.   I will use the clinic as a resource and I understand I can contact my clinic with 24/7 after hours services available  7.  " Care Coordinator will remain available as needed            Intervention/Education provided during outreach:   RNCC called and spoke with patient; introduced self, discussed role of Care Coordination and explained reason for call    Plan:   -Patient will contact the care team with questions, concerns, support needs   -Patient will use the clinic as a resource and understands (s)he can contact the [facility] with 24/7 after hours services available  -Care Coordinator will remain available as needed  -RNCC will follow up in one month for follow up appointments/recommendations and goal progression     Priya Wheat RN, BSN, PHN  Primary Care / Care Coordinator   [facility]  [email or web address]   [phone]

## 2022-07-18 ENCOUNTER — OFFICE VISIT (OUTPATIENT)
Dept: PULMONOLOGY | Facility: CLINIC | Age: 67
End: 2022-07-18
Payer: COMMERCIAL

## 2022-07-18 VITALS
WEIGHT: 152.4 LBS | DIASTOLIC BLOOD PRESSURE: 67 MMHG | SYSTOLIC BLOOD PRESSURE: 132 MMHG | OXYGEN SATURATION: 96 % | BODY MASS INDEX: 23.1 KG/M2 | HEIGHT: 68 IN | HEART RATE: 92 BPM

## 2022-07-18 DIAGNOSIS — Z87.891 PERSONAL HISTORY OF TOBACCO USE: Primary | ICD-10-CM

## 2022-07-18 PROCEDURE — G0296 VISIT TO DETERM LDCT ELIG: HCPCS | Performed by: INTERNAL MEDICINE

## 2022-07-18 PROCEDURE — 99214 OFFICE O/P EST MOD 30 MIN: CPT | Mod: 25 | Performed by: INTERNAL MEDICINE

## 2022-07-18 NOTE — PATIENT INSTRUCTIONS
Lung Cancer Screening   Frequently Asked Questions  If you are at high-risk for lung cancer, getting screened with low-dose computed tomography (LDCT) every year can help save your life. This handout offers answers to some of the most common questions about lung cancer screening. If you have other questions, please call [phone] ([phone]).     What is it?  Lung cancer screening uses special X-ray technology to create an image of your lung tissue. The exam is quick and easy and takes less than 10 seconds. We don t give you any medicine or use any needles. You can eat before and after the exam. You don t need to change your clothes as long as the clothing on your chest doesn t contain metal. But, you do need to be able to hold your breath for at least 6 seconds during the exam.    What is the goal of lung cancer screening?  The goal of lung cancer screening is to save lives. Many times, lung cancer is not found until a person starts having physical symptoms. Lung cancer screening can help detect lung cancer in the earliest stages when it may be easier to treat.    Who should be screened for lung cancer?  We suggest lung cancer screening for anyone who is at high-risk for lung cancer. You are in the high-risk group if you:      are between the ages of 55 and 79, and    have smoked at least 1 pack of cigarettes a day for 20 or more years, and    still smoke or have quit within the past 15 years.    However, if you have a new cough or shortness of breath, you should talk to your doctor before being screened.    Why does it matter if I have symptoms?  Certain symptoms can be a sign that you have a condition in your lungs that should be checked and treated by your doctor. These symptoms include fever, chest pain, a new or changing cough, shortness of breath that you have never felt before, coughing up blood or unexplained weight loss. Having any of these symptoms can greatly affect the results of lung  cancer screening.       Should all smokers get an LDCT lung cancer screening exam?  It depends. Lung cancer screening is for a very specific group of men and women who have a history of heavy smoking over a long period of time (see  Who should be screened for lung cancer  above).  I am in the high-risk group, but have been diagnosed with cancer in the past. Is LDCT lung cancer screening right for me?  In some cases, you should not have LDCT lung screening, such as when your doctor is already following your cancer with CT scan studies. Your doctor will help you decide if LDCT lung screening is right for you.  Do I need to have a screening exam every year?  Yes. If you are in the high-risk group described earlier, you should get an LDCT lung cancer screening exam every year until you are 79, or are no longer willing or able to undergo screening and possible procedures to diagnose and treat lung cancer.  How effective is LDCT at preventing death from lung cancer?  Studies have shown that LDCT lung cancer screening can lower the risk of death from lung cancer by 20 percent in people who are at high-risk.  What are the risks?  There are some risks and limitations of LDCT lung cancer screening. We want to make sure you understand the risks and benefits, so please let us know if you have any questions. Your doctor may want to talk with you more about these risks.    Radiation exposure: As with any exam that uses radiation, there is a very small increased risk of cancer. The amount of radiation in LDCT is small--about the same amount a person would get from a mammogram. Your doctor orders the exam when he or she feels the potential benefits outweigh the risks.    False negatives: No test is perfect, including LDCT. It is possible that you may have a medical condition, including lung cancer, that is not found during your exam. This is called a false negative result.    False positives and more testing: LDCT very often finds  something in the lung that could be cancer, but in fact is not. This is called a false positive result. False positive tests often cause anxiety. To make sure these findings are not cancer, you may need to have more tests. These tests will be done only if you give us permission. Sometimes patients need a treatment that can have side effects, such as a biopsy. For more information on false positives, see  What can I expect from the results?     Findings not related to lung cancer: Your LDCT exam also takes pictures of areas of your body next to your lungs. In a very small number of cases, the CT scan will show an abnormal finding in one of these areas, such as your kidneys, adrenal glands, liver or thyroid. This finding may not be serious, but you may need more tests. Your doctor can help you decide what other tests you may need, if any.  What can I expect from the results?  About 1 out of 4 LDCT exams will find something that may need more tests. Most of the time, these findings are lung nodules. Lung nodules are very small collections of tissue in the lung. These nodules are very common, and the vast majority--more than 97 percent--are not cancer (benign). Most are normal lymph nodes or small areas of scarring from past infections.  But, if a small lung nodule is found to be cancer, the cancer can be cured more than 90 percent of the time. To know if the nodule is cancer, we may need to get more images before your next yearly screening exam. If the nodule has suspicious features (for example, it is large, has an odd shape or grows over time), we will refer you to a specialist for further testing.  Will my doctor also get the results?  Yes. Your doctor will get a copy of your results.  Is it okay to keep smoking now that there s a cancer screening exam?  No. Tobacco is one of the strongest cancer-causing agents. It causes not only lung cancer, but other cancers and cardiovascular (heart) diseases as well. The damage  caused by smoking builds over time. This means that the longer you smoke, the higher your risk of disease. While it is never too late to quit, the sooner you quit, the better.  Where can I find help to quit smoking?  The best way to prevent lung cancer is to stop smoking. If you have already quit smoking, congratulations and keep it up! For help on quitting smoking, please call [employer] at 2-590-QUITNOW (1-985.419.2182) or the American [employer] Society at 1-792.674.9099 to find local resources near you.  One-on-one health coaching:  If you d prefer to work individually with a health care provider on tobacco cessation, we offer:      Medication Therapy Management:  Our specially trained pharmacists work closely with you and your doctor to help you quit smoking.  Call [phone] or [phone] (toll free).

## 2022-07-18 NOTE — LETTER
"7/18/2022       RE: Irene Haile  [address]  [place] [address]     Dear Colleague,    Thank you for referring your patient, Irene Haile, to the [facility]. Please see a copy of my visit note below.    Pulmonary Clinic Return Visit  History of Present Illness    Ms. Myrna Haile is a 66 yof with a history of tobacco use who presents to pulmonary clinic today for follow up of smoking associated lung disease.  To briefly review, we last visited in December following a hospitalization for pneumonia with hemoptysis and abnormal CT chest imaging.  At that visit she described slight RIZVI but was otherwise largely doing well on Advair.  I had recommended repeat CT for follow up of previously abnormal imaging, particularly in the context of positive OCTAVIANO but this was denied by insurance.  A subsequent CXR was obtained and returned \"normal\" so we held off on further imaging.    She returns to clinic today overall stable in terms of respiratory symptoms.  She continues to smoke a few cigarettes per day but some days can go without.  She is interested in quitting and is using nicotine lozenges to help.  She tells me she started a new part time job which has added some stress to her life which is also contributing to ongoing tobacco use.  Since we last visited she was switched from Advair to Anoro.  No flares or exacerbations of lung disease. No hemoptysis.  Stable RIZVI.      She is a current every day smoker since the age of 17.  On average she is smoked approximately 1 pack/day.       Review of Systems:  10 of 14 systems reviewed and are negative unless otherwise stated in HPI.    Past Medical History:   Diagnosis Date     Axillary adenopathy 6/5/2013     COPD (chronic obstructive pulmonary disease) (H) 6/5/2013     Hypothyroidism, unspecified type 12/30/2017     Personal history of tobacco use, presenting hazards " to health      Pulmonary hemorrhage 2013     UTI (urinary tract infection)        Past Surgical History:   Procedure Laterality Date     ZZC NONSPECIFIC PROCEDURE   2010    Laparoscopic appendectomy     ZZC TOTAL ABDOM HYSTERECTOMY       BSO       Family History   Problem Relation Age of Onset     Diabetes Brother         Diag. age 41     Breast Cancer Mother      Cancer Mother         All over her body     Alcohol/Drug Father         Has Liver Problems     Cancer - colorectal Brother         before age 50     Cancer Brother         liver cancer      Family History Negative Brother         ulcerative colitis     Family History Negative Sister      Sjogren's Daughter        Social History     Socioeconomic History     Marital status:      Spouse name: Larry     Number of children: 2     Years of education: 15     Highest education level: None   Occupational History     Occupation: Clerical     Employer: [employer]    Tobacco Use     Smoking status: Current Some Day Smoker     Packs/day: 0.50     Years: 20.00     Pack years: 10.00     Types: Cigarettes     Last attempt to quit: 9/10/2021     Years since quittin.2     Smokeless tobacco: Never Used     Tobacco comment:     Substance and Sexual Activity     Alcohol use: No     Drug use: No     Sexual activity: Yes     Partners: Male     Birth control/protection: Surgical     Comment: hysterectomy-   Other Topics Concern     Parent/sibling w/ CABG, MI or angioplasty before 65F 55M? Not Asked   Social History Narrative     None     Social Determinants of Health     Financial Resource Strain: Low Risk      Difficulty of Paying Living Expenses: Not very hard   Food Insecurity: No Food Insecurity     Worried About Running Out of Food in the Last Year: Never true     Ran Out of Food in the Last Year: Never true   Transportation Needs: No Transportation Needs     Lack of Transportation (Medical): No     Lack of Transportation (Non-Medical):  "No   Physical Activity: Not on file   Stress: Stress Concern Present     Feeling of Stress : To some extent   Social Connections: Moderately Isolated     Frequency of Communication with Friends and Family: More than three times a week     Frequency of Social Gatherings with Friends and Family: Once a week     Attends Oriental orthodox Services: Never     Active Member of Clubs or Organizations: No     Attends Club or Organization Meetings: Never     Marital Status:    Intimate Partner Violence: Not on file   Housing Stability: Low Risk      Unable to Pay for Housing in the Last Year: No     Number of Places Lived in the Last Year: 1     Unstable Housing in the Last Year: No         Allergies   Allergen Reactions     Augmented Betamethasone Diprop [Betamethasone] GI Disturbance     Augmentin       Vomiting/nausea     Codeine      Insomnia       Nitrofurantoin      Nausea, loss appetite     Shingrix [Zoster Vac Recomb Adjuvanted]      Severe body pain for 3-4 weeks         Current Outpatient Medications:      levothyroxine (SYNTHROID/LEVOTHROID) 100 MCG tablet, TAKE 1 TABLET(100 MCG) BY MOUTH DAILY, Disp: 90 tablet, Rfl: 1     umeclidinium-vilanterol (ANORO ELLIPTA) 62.5-25 MCG/INH oral inhaler, Inhale 1 puff into the lungs daily, Disp: 60 each, Rfl: 11      Physical Exam:  /67 (BP Location: Left arm, Patient Position: Sitting, Cuff Size: Adult Regular)   Pulse 92   Ht 1.727 m (5' 8\")   Wt 69.1 kg (152 lb 6.4 oz)   SpO2 96%   BMI 23.17 kg/m    GENERAL: Well developed, well nourished, alert, and in no apparent distress.  HEENT: Normocephalic, atraumatic. PERRL, EOMI.   NECK: supple, no masses, no thyromegaly.  RESP:  Normal respiratory effort.  CTAB.  No rales, wheezes, rhonchi.  No cyanosis or clubbing.  CV: Normal S1, S2, regular rhythm, normal rate. No murmur.  No LE edema.   ABDOMEN:  non-distended.   SKIN: warm and dry. No rash.  NEURO: AAOx3.  Normal gait.  Fluent speech.  PSYCH: mentation appears " "normal.     Results:  CXR 5/24: normal      Assessment and Plan:   Irene Haile is a 66 year old female with a history of tobacco abuse who presents to pulmonary clinic today for follow up.  1. COPD GOLD-0.  Does not technically meet diagnostic criteria as PFTs are normal. However given personal history of tobacco abuse and mild respiratory symptoms treated with inhalers, she likely meets criteria for \"Gold 0\" or \"Pre-COPD\". Symptoms are stable on Anoro.  Will continue Anoro.   2. Previous abnormal CT chest.  CT chest obtained during her hospital admission in Fall 2021 showed possible new ILD with subpleural cysts and traction bronchiectasis. Repeat CT chest was desired but denied by insurance.  CXR was subsequently obtained and read as normal in May.  Repeat OCTAVIANO level this spring was normal.  Given previous abnormal findings, it remains reasonable to more definitively evaluate for ILD which we should be able to do on her forthcoming lung ca screening CT.  4. Tobacco use.  Complete smoking cessation was strongly recommended.  Extensive discussion of continued risk of smoking with respect to declining lung function and risk of recurrent bronchitis.  Patient would like to try Chantix - rx provided.  5. Lung cancer screening.  Qualifies based on age and smoking history.  Low dose CT chest ordered today.  Questions and concerns were answered to the patient's satisfaction.  she was provided with my contact information should new questions or concerns arise in the interim.  Return to clinic in 1 year for follow up.  Up to date on Pneumovax (2021) and a seasonal influenza vaccine.  COVID vaccinated.    Lara Riley MD  Pulmonary and Critical Care Medicine  The above note was dictated using voice recognition software and may include typographical errors. Please contact the author for any clarifications.    Lung Cancer Screening Shared Decision Making Visit     Irene Haile is eligible for lung cancer " screening on the basis of:   has not not experienced symptoms suggestive of lung cancer.   born on 1955, 66 year old years old.   smoked 1 packs of cigarettes for  49 years for a total of 49 pack-years   She is currently still smoking.      I have discussed with patient the risks and benefits of screening for lung cancer with low-dose CT.     The risks include:   radiation exposure    false positives     over-diagnosis    The benefit of early detection of lung cancer is contingent upon adherence to annual screening or more frequent follow up if indicated.     Furthermore, reaping the benefits of screening requires Irene Haile to be willing and able to undergo diagnostic procedures, if indicated.     We did discuss that the only way to prevent lung cancer is to not smoke. Smoking cessation assistance was offered.    Lung Cancer Screening Shared Decision Making Visit     Irene Haile, a 66 year old female, is eligible for lung cancer screening    History   Smoking Status     Current Some Day Smoker     Packs/day: 0.50     Years: 20.00     Types: Cigarettes     Last attempt to quit: 9/10/2021   Smokeless Tobacco     Never Used     Comment:         I have discussed with patient the risks and benefits of screening for lung cancer with low-dose CT.     The risks include:    radiation exposure: one low dose chest CT has as much ionizing radiation as about 15 chest x-rays, or 6 months of background radiation living in [place]      false positives: most findings/nodules are NOT cancer, but some might still require additional diagnostic evaluation, including biopsy    over-diagnosis: some slow growing cancers that might never have been clinically significant will be detected and treated unnecessarily     The benefit of early detection of lung cancer is contingent upon adherence to annual screening or more frequent follow up if indicated.     Furthermore, to benefit from screening, [place] must be willing  and able to undergo diagnostic procedures, if indicated. Although no specific guide is available for determining severity of comorbidities, it is reasonable to withhold screening in patients who have greater mortality risk from other diseases.     We did discuss that the best way to prevent lung cancer is to not smoke.    Some patients may value a numeric estimation of lung cancer risk when evaluating if lung cancer screening is right for them, here is one calculator:    ShouldIScreen      Again, thank you for allowing me to participate in the care of your patient.      Sincerely,    Shikha Riley MD

## 2022-07-18 NOTE — PROGRESS NOTES
Lung Cancer Screening Shared Decision Making Visit     Irene Haile, a 66 year old female, is eligible for lung cancer screening    History   Smoking Status     Current Some Day Smoker     Packs/day: 0.50     Years: 20.00     Types: Cigarettes     Last attempt to quit: 9/10/2021   Smokeless Tobacco     Never Used     Comment:         I have discussed with patient the risks and benefits of screening for lung cancer with low-dose CT.     The risks include:    radiation exposure: one low dose chest CT has as much ionizing radiation as about 15 chest x-rays, or 6 months of background radiation living in [place]      false positives: most findings/nodules are NOT cancer, but some might still require additional diagnostic evaluation, including biopsy    over-diagnosis: some slow growing cancers that might never have been clinically significant will be detected and treated unnecessarily     The benefit of early detection of lung cancer is contingent upon adherence to annual screening or more frequent follow up if indicated.     Furthermore, to benefit from screening, [place] must be willing and able to undergo diagnostic procedures, if indicated. Although no specific guide is available for determining severity of comorbidities, it is reasonable to withhold screening in patients who have greater mortality risk from other diseases.     We did discuss that the best way to prevent lung cancer is to not smoke.    Some patients may value a numeric estimation of lung cancer risk when evaluating if lung cancer screening is right for them, here is one calculator:    ShouldIScreen

## 2022-07-18 NOTE — NURSING NOTE
"Chief Complaint   Patient presents with     Follow Up       Vitals:    07/18/22 1605   BP: 132/67   BP Location: Left arm   Patient Position: Sitting   Cuff Size: Adult Regular   Pulse: 92   SpO2: 96%   Weight: 69.1 kg (152 lb 6.4 oz)   Height: 1.727 m (5' 8\")       Body mass index is 23.17 kg/m .        BECKY Lux    "

## 2022-07-18 NOTE — PROGRESS NOTES
"Pulmonary Clinic Return Visit  History of Present Illness    Ms. Myran Haile is a 66 yof with a history of tobacco use who presents to pulmonary clinic today for follow up of smoking associated lung disease.  To briefly review, we last visited in December following a hospitalization for pneumonia with hemoptysis and abnormal CT chest imaging.  At that visit she described slight RIZVI but was otherwise largely doing well on Advair.  I had recommended repeat CT for follow up of previously abnormal imaging, particularly in the context of positive OCTAVIANO but this was denied by insurance.  A subsequent CXR was obtained and returned \"normal\" so we held off on further imaging.    She returns to clinic today overall stable in terms of respiratory symptoms.  She continues to smoke a few cigarettes per day but some days can go without.  She is interested in quitting and is using nicotine lozenges to help.  She tells me she started a new part time job which has added some stress to her life which is also contributing to ongoing tobacco use.  Since we last visited she was switched from Advair to Anoro.  No flares or exacerbations of lung disease. No hemoptysis.  Stable RIZVI.      She is a current every day smoker since the age of 17.  On average she is smoked approximately 1 pack/day.       Review of Systems:  10 of 14 systems reviewed and are negative unless otherwise stated in HPI.    Past Medical History:   Diagnosis Date     Axillary adenopathy 6/5/2013     COPD (chronic obstructive pulmonary disease) (H) 6/5/2013     Hypothyroidism, unspecified type 12/30/2017     Personal history of tobacco use, presenting hazards to health      Pulmonary hemorrhage 6/5/2013     UTI (urinary tract infection)        Past Surgical History:   Procedure Laterality Date     ZZ NONSPECIFIC PROCEDURE   June 2010    Laparoscopic appendectomy     Z TOTAL ABDOM HYSTERECTOMY  2002     BSO       Family History   Problem Relation Age of Onset     " Diabetes Brother         Diag. age 41     Breast Cancer Mother      Cancer Mother         All over her body     Alcohol/Drug Father         Has Liver Problems     Cancer - colorectal Brother         before age 50     Cancer Brother         liver cancer      Family History Negative Brother         ulcerative colitis     Family History Negative Sister      Karuna's Daughter        Social History     Socioeconomic History     Marital status:      Spouse name: Larry     Number of children: 2     Years of education: 15     Highest education level: None   Occupational History     Occupation: Clerical     Employer: [employer]    Tobacco Use     Smoking status: Current Some Day Smoker     Packs/day: 0.50     Years: 20.00     Pack years: 10.00     Types: Cigarettes     Last attempt to quit: 9/10/2021     Years since quittin.2     Smokeless tobacco: Never Used     Tobacco comment:     Substance and Sexual Activity     Alcohol use: No     Drug use: No     Sexual activity: Yes     Partners: Male     Birth control/protection: Surgical     Comment: hysterectomy-   Other Topics Concern     Parent/sibling w/ CABG, MI or angioplasty before 65F 55M? Not Asked   Social History Narrative     None     Social Determinants of Health     Financial Resource Strain: Low Risk      Difficulty of Paying Living Expenses: Not very hard   Food Insecurity: No Food Insecurity     Worried About Running Out of Food in the Last Year: Never true     Ran Out of Food in the Last Year: Never true   Transportation Needs: No Transportation Needs     Lack of Transportation (Medical): No     Lack of Transportation (Non-Medical): No   Physical Activity: Not on file   Stress: Stress Concern Present     Feeling of Stress : To some extent   Social Connections: Moderately Isolated     Frequency of Communication with Friends and Family: More than three times a week     Frequency of Social Gatherings with Friends and Family: Once a week      "Attends Protestant Services: Never     Active Member of Clubs or Organizations: No     Attends Club or Organization Meetings: Never     Marital Status:    Intimate Partner Violence: Not on file   Housing Stability: Low Risk      Unable to Pay for Housing in the Last Year: No     Number of Places Lived in the Last Year: 1     Unstable Housing in the Last Year: No         Allergies   Allergen Reactions     Augmented Betamethasone Diprop [Betamethasone] GI Disturbance     Augmentin       Vomiting/nausea     Codeine      Insomnia       Nitrofurantoin      Nausea, loss appetite     Shingrix [Zoster Vac Recomb Adjuvanted]      Severe body pain for 3-4 weeks         Current Outpatient Medications:      levothyroxine (SYNTHROID/LEVOTHROID) 100 MCG tablet, TAKE 1 TABLET(100 MCG) BY MOUTH DAILY, Disp: 90 tablet, Rfl: 1     umeclidinium-vilanterol (ANORO ELLIPTA) 62.5-25 MCG/INH oral inhaler, Inhale 1 puff into the lungs daily, Disp: 60 each, Rfl: 11      Physical Exam:  /67 (BP Location: Left arm, Patient Position: Sitting, Cuff Size: Adult Regular)   Pulse 92   Ht 1.727 m (5' 8\")   Wt 69.1 kg (152 lb 6.4 oz)   SpO2 96%   BMI 23.17 kg/m    GENERAL: Well developed, well nourished, alert, and in no apparent distress.  HEENT: Normocephalic, atraumatic. PERRL, EOMI.   NECK: supple, no masses, no thyromegaly.  RESP:  Normal respiratory effort.  CTAB.  No rales, wheezes, rhonchi.  No cyanosis or clubbing.  CV: Normal S1, S2, regular rhythm, normal rate. No murmur.  No LE edema.   ABDOMEN:  non-distended.   SKIN: warm and dry. No rash.  NEURO: AAOx3.  Normal gait.  Fluent speech.  PSYCH: mentation appears normal.     Results:  CXR 5/24: normal      Assessment and Plan:   Irene Haile is a 66 year old female with a history of tobacco abuse who presents to pulmonary clinic today for follow up.  1. COPD GOLD-0.  Does not technically meet diagnostic criteria as PFTs are normal. However given personal history of " "tobacco abuse and mild respiratory symptoms treated with inhalers, she likely meets criteria for \"Gold 0\" or \"Pre-COPD\". Symptoms are stable on Anoro.  Will continue Anoro.   2. Previous abnormal CT chest.  CT chest obtained during her hospital admission in Fall 2021 showed possible new ILD with subpleural cysts and traction bronchiectasis. Repeat CT chest was desired but denied by insurance.  CXR was subsequently obtained and read as normal in May.  Repeat OCTAVIANO level this spring was normal.  Given previous abnormal findings, it remains reasonable to more definitively evaluate for ILD which we should be able to do on her forthcoming lung ca screening CT.  4. Tobacco use.  Complete smoking cessation was strongly recommended.  Extensive discussion of continued risk of smoking with respect to declining lung function and risk of recurrent bronchitis.  Patient would like to try Chantix - rx provided.  5. Lung cancer screening.  Qualifies based on age and smoking history.  Low dose CT chest ordered today.  Questions and concerns were answered to the patient's satisfaction.  she was provided with my contact information should new questions or concerns arise in the interim.  Return to clinic in 1 year for follow up.  Up to date on Pneumovax (2021) and a seasonal influenza vaccine.  COVID vaccinated.    Lara Riley MD  Pulmonary and Critical Care Medicine    The above note was dictated using voice recognition software and may include typographical errors. Please contact the author for any clarifications.    Lung Cancer Screening Shared Decision Making Visit     Irene Haile is eligible for lung cancer screening on the basis of:   has not not experienced symptoms suggestive of lung cancer.   born on 1955, 66 year old years old.   smoked 1 packs of cigarettes for  49 years for a total of 49 pack-years   She is currently still smoking.      I have discussed with patient the risks and benefits of screening for " lung cancer with low-dose CT.     The risks include:   radiation exposure    false positives     over-diagnosis    The benefit of early detection of lung cancer is contingent upon adherence to annual screening or more frequent follow up if indicated.     Furthermore, reaping the benefits of screening requires Irene Haile to be willing and able to undergo diagnostic procedures, if indicated.     We did discuss that the only way to prevent lung cancer is to not smoke. Smoking cessation assistance was offered.

## 2022-07-18 NOTE — LETTER
"    7/18/2022         RE: Irene Haile  [address]  [place] [address]        Dear Colleague,    Thank you for referring your patient, Irene Haile, to the [facility] [place]. Please see a copy of my visit note below.    Pulmonary Clinic Return Visit  History of Present Illness    Ms. Myrna Haile is a 66 yof with a history of tobacco use who presents to pulmonary clinic today for follow up of smoking associated lung disease.  To briefly review, we last visited in December following a hospitalization for pneumonia with hemoptysis and abnormal CT chest imaging.  At that visit she described slight RIZVI but was otherwise largely doing well on Advair.  I had recommended repeat CT for follow up of previously abnormal imaging, particularly in the context of positive OCTAVIANO but this was denied by insurance.  A subsequent CXR was obtained and returned \"normal\" so we held off on further imaging.    She returns to clinic today overall stable in terms of respiratory symptoms.  She continues to smoke a few cigarettes per day but some days can go without.  She is interested in quitting and is using nicotine lozenges to help.  She tells me she started a new part time job which has added some stress to her life which is also contributing to ongoing tobacco use.  Since we last visited she was switched from Advair to Anoro.  No flares or exacerbations of lung disease. No hemoptysis.  Stable RIZVI.      She is a current every day smoker since the age of 17.  On average she is smoked approximately 1 pack/day.       Review of Systems:  10 of 14 systems reviewed and are negative unless otherwise stated in HPI.    Past Medical History:   Diagnosis Date     Axillary adenopathy 6/5/2013     COPD (chronic obstructive pulmonary disease) (H) 6/5/2013     Hypothyroidism, unspecified type 12/30/2017     Personal history of tobacco use, presenting hazards to [facility]      Pulmonary hemorrhage 6/5/2013     UTI " Man Hogan is a 65 year old male presents today for No chief complaint on file.    Man Hogan is a 65 year old male presenting with cat bite to right hand which occurred prior to  3/10/21.  Was seen in ER on 3/10 for chest pain but ER looked at the hand that day.  Pt isn't sure when the bite occurred.  He states ER \"taped up his hand but didn't do anything\".  He is now here to see if he needs to go to ER or get a prescription.    Cat was his but from the area, was a stray.  But he states he had it for years.      ALLERGIES:  No Known Allergies    Medications: medications verified and updated      Health Maintenance Due   Topic Date Due   • Pneumococcal Vaccine 0-64 (1 of 3 - PCV13) Never done   • Depression Screening  Never done   • DTaP/Tdap/Td Vaccine (1 - Tdap) Never done   • Shingles Vaccine (1 of 2) Never done   • Colorectal Cancer Screen-  Never done   • Hepatitis C Screening  Never done   • Influenza Vaccine (1) Never done       PCP: No Pcp  Visit Vitals  BP (!) 161/95   Pulse (!) 108   Temp 100 °F (37.8 °C) (Oral)   Resp 16   Wt 63.5 kg   SpO2 99%   BMI 20.67 kg/m²       Patient here alone.    Patient would like communication of their results via:    Cell Phone:   Telephone Information:   Mobile [phone]     Okay to leave a message containing results? Yes       (urinary tract infection)        Past Surgical History:   Procedure Laterality Date     ZZC NONSPECIFIC PROCEDURE   2010    Laparoscopic appendectomy     ZZC TOTAL ABDOM HYSTERECTOMY       BSO       Family History   Problem Relation Age of Onset     Diabetes Brother         Diag. age 41     Breast Cancer Mother      Cancer Mother         All over her body     Alcohol/Drug Father         Has Liver Problems     Cancer - colorectal Brother         before age 50     Cancer Brother         liver cancer      Family History Negative Brother         ulcerative colitis     Family History Negative Sister      Thomogramanda's Daughter        Social History     Socioeconomic History     Marital status:      Spouse name: Larry     Number of children: 2     Years of education: 15     Highest education level: None   Occupational History     Occupation: Clerical     Employer: [employer]    Tobacco Use     Smoking status: Current Some Day Smoker     Packs/day: 0.50     Years: 20.00     Pack years: 10.00     Types: Cigarettes     Last attempt to quit: 9/10/2021     Years since quittin.2     Smokeless tobacco: Never Used     Tobacco comment:     Substance and Sexual Activity     Alcohol use: No     Drug use: No     Sexual activity: Yes     Partners: Male     Birth control/protection: Surgical     Comment: hysterectomy-   Other Topics Concern     Parent/sibling w/ CABG, MI or angioplasty before 65F 55M? Not Asked   Social History Narrative     None     Social Determinants of Health     Financial Resource Strain: Low Risk      Difficulty of Paying Living Expenses: Not very hard   Food Insecurity: No Food Insecurity     Worried About Running Out of Food in the Last Year: Never true     Ran Out of Food in the Last Year: Never true   Transportation Needs: No Transportation Needs     Lack of Transportation (Medical): No     Lack of Transportation (Non-Medical): No   Physical Activity: Not on file   Stress: Stress  "Concern Present     Feeling of Stress : To some extent   Social Connections: Moderately Isolated     Frequency of Communication with Friends and Family: More than three times a week     Frequency of Social Gatherings with Friends and Family: Once a week     Attends Taoism Services: Never     Active Member of Clubs or Organizations: No     Attends Club or Organization Meetings: Never     Marital Status:    Intimate Partner Violence: Not on file   Housing Stability: Low Risk      Unable to Pay for Housing in the Last Year: No     Number of Places Lived in the Last Year: 1     Unstable Housing in the Last Year: No         Allergies   Allergen Reactions     Augmented Betamethasone Diprop [Betamethasone] GI Disturbance     Augmentin       Vomiting/nausea     Codeine      Insomnia       Nitrofurantoin      Nausea, loss appetite     Shingrix [Zoster Vac Recomb Adjuvanted]      Severe body pain for 3-4 weeks         Current Outpatient Medications:      levothyroxine (SYNTHROID/LEVOTHROID) 100 MCG tablet, TAKE 1 TABLET(100 MCG) BY MOUTH DAILY, Disp: 90 tablet, Rfl: 1     umeclidinium-vilanterol (ANORO ELLIPTA) 62.5-25 MCG/INH oral inhaler, Inhale 1 puff into the lungs daily, Disp: 60 each, Rfl: 11      Physical Exam:  /67 (BP Location: Left arm, Patient Position: Sitting, Cuff Size: Adult Regular)   Pulse 92   Ht 1.727 m (5' 8\")   Wt 69.1 kg (152 lb 6.4 oz)   SpO2 96%   BMI 23.17 kg/m    GENERAL: Well developed, well nourished, alert, and in no apparent distress.  HEENT: Normocephalic, atraumatic. PERRL, EOMI.   NECK: supple, no masses, no thyromegaly.  RESP:  Normal respiratory effort.  CTAB.  No rales, wheezes, rhonchi.  No cyanosis or clubbing.  CV: Normal S1, S2, regular rhythm, normal rate. No murmur.  No LE edema.   ABDOMEN:  non-distended.   SKIN: warm and dry. No rash.  NEURO: AAOx3.  Normal gait.  Fluent speech.  PSYCH: mentation appears normal.     Results:  CXR 5/24: normal      Assessment and " "Plan:   Irene Haile is a 66 year old female with a history of tobacco abuse who presents to pulmonary clinic today for follow up.  1. COPD GOLD-0.  Does not technically meet diagnostic criteria as PFTs are normal. However given personal history of tobacco abuse and mild respiratory symptoms treated with inhalers, she likely meets criteria for \"Gold 0\" or \"Pre-COPD\". Symptoms are stable on Anoro.  Will continue Anoro.   2. Previous abnormal CT chest.  CT chest obtained during her hospital admission in Fall 2021 showed possible new ILD with subpleural cysts and traction bronchiectasis. Repeat CT chest was desired but denied by insurance.  CXR was subsequently obtained and read as normal in May.  Repeat OCTAVIANO level this spring was normal.  Given previous abnormal findings, it remains reasonable to more definitively evaluate for ILD which we should be able to do on her forthcoming lung ca screening CT.  4. Tobacco use.  Complete smoking cessation was strongly recommended.  Extensive discussion of continued risk of smoking with respect to declining lung function and risk of recurrent bronchitis.  Patient would like to try Chantix - rx provided.  5. Lung cancer screening.  Qualifies based on age and smoking history.  Low dose CT chest ordered today.  Questions and concerns were answered to the patient's satisfaction.  she was provided with my contact information should new questions or concerns arise in the interim.  Return to clinic in 1 year for follow up.  Up to date on Pneumovax (2021) and a seasonal influenza vaccine.  COVID vaccinated.    Lara Riley MD  Pulmonary and Critical Care Medicine    The above note was dictated using voice recognition software and may include typographical errors. Please contact the author for any clarifications.    Lung Cancer Screening Shared Decision Making Visit     Irene Haile is eligible for lung cancer screening on the basis of:   has not not experienced symptoms " suggestive of lung cancer.   born on 1955, 66 year old years old.   smoked 1 packs of cigarettes for  49 years for a total of 49 pack-years   She is currently still smoking.      I have discussed with patient the risks and benefits of screening for lung cancer with low-dose CT.     The risks include:   radiation exposure    false positives     over-diagnosis    The benefit of early detection of lung cancer is contingent upon adherence to annual screening or more frequent follow up if indicated.     Furthermore, reaping the benefits of screening requires Irene Haile to be willing and able to undergo diagnostic procedures, if indicated.     We did discuss that the only way to prevent lung cancer is to not smoke. Smoking cessation assistance was offered.                                Lung Cancer Screening Shared Decision Making Visit     Irene Haile, a 66 year old female, is eligible for lung cancer screening    History   Smoking Status     Current Some Day Smoker     Packs/day: 0.50     Years: 20.00     Types: Cigarettes     Last attempt to quit: 9/10/2021   Smokeless Tobacco     Never Used     Comment:         I have discussed with patient the risks and benefits of screening for lung cancer with low-dose CT.     The risks include:    radiation exposure: one low dose chest CT has as much ionizing radiation as about 15 chest x-rays, or 6 months of background radiation living in [place]      false positives: most findings/nodules are NOT cancer, but some might still require additional diagnostic evaluation, including biopsy    over-diagnosis: some slow growing cancers that might never have been clinically significant will be detected and treated unnecessarily     The benefit of early detection of lung cancer is contingent upon adherence to annual screening or more frequent follow up if indicated.     Furthermore, to benefit from screening, [place] must be willing and able to undergo diagnostic  procedures, if indicated. Although no specific guide is available for determining severity of comorbidities, it is reasonable to withhold screening in patients who have greater mortality risk from other diseases.     We did discuss that the best way to prevent lung cancer is to not smoke.    Some patients may value a numeric estimation of lung cancer risk when evaluating if lung cancer screening is right for them, here is one calculator:    ShouldIScreen      Again, thank you for allowing me to participate in the care of your patient.        Sincerely,        Shikha Riley MD

## 2022-08-09 ENCOUNTER — HOSPITAL ENCOUNTER (OUTPATIENT)
Dept: CT IMAGING | Facility: CLINIC | Age: 67
Discharge: HOME | End: 2022-08-09
Attending: INTERNAL MEDICINE | Admitting: INTERNAL MEDICINE
Payer: COMMERCIAL

## 2022-08-09 DIAGNOSIS — Z87.891 PERSONAL HISTORY OF TOBACCO USE: ICD-10-CM

## 2022-08-09 PROCEDURE — 71271 CT THORAX LUNG CANCER SCR C-: CPT

## 2022-08-12 ENCOUNTER — PATIENT OUTREACH (OUTPATIENT)
Dept: CARE COORDINATION | Facility: CLINIC | Age: 67
End: 2022-08-12

## 2022-08-12 ASSESSMENT — ACTIVITIES OF DAILY LIVING (ADL): DEPENDENT_IADLS:: INDEPENDENT

## 2022-08-12 NOTE — PROGRESS NOTES
Clinic Care Coordination Contact  [facility]/Voicemail    Referral Source: Care Team  Clinical Data: Care Coordinator Outreach  Outreach attempted x 1.  Left message on patient's voicemail with call back information and requested return call.    Plan: Care Coordinator will try to reach patient again in 10-14 business days.     Priya Wheat RN, BSN, PHN  Primary Care / Care Coordinator   [facility]  E-mail [email or web address]   [phone]

## 2022-08-28 DIAGNOSIS — E03.9 HYPOTHYROIDISM, UNSPECIFIED TYPE: ICD-10-CM

## 2022-08-28 RX ORDER — LEVOTHYROXINE SODIUM 100 UG/1
TABLET ORAL
Qty: 90 TABLET | Refills: 3 | Status: SHIPPED | OUTPATIENT
Start: 2022-08-28 | End: 2022-11-29

## 2022-08-29 NOTE — TELEPHONE ENCOUNTER
Elevated TSH noted with levothyroxine 100 mcg tablet the patient has had diarrhea side effects with higher dosage 112 mcg.  Therefore continuing 100 mcg dose which allows patient to feel clinically euthyroid

## 2022-09-20 NOTE — TELEPHONE ENCOUNTER
Call pt. Last seen > 1 year ago. RF was done for 30 days. Needs f/u appt with me in the next  1 month before further refills will be considered. Inform pt and assist with scheduling appt   PT Evaluation     Today's date: 2022  Patient name: Melissa Lang  : 1972  MRN: [id number]  Referring provider: Malcolm Loya MD  Dx:   Encounter Diagnosis     ICD-10-CM    1  Acute pain of right knee  M25 561 Ambulatory Referral to Physical Therapy                  Assessment  Assessment details: Melissa Lang is a 53 y o  male who presents with pain, decreased strength, decreased ROM, ambulatory dysfunction and increased joint mobility  Due to these impairments, patient has difficulty performing a/iadls, recreational activities, work-related activities and engaging in social activities  Patient's clinical presentation is consistent with their referring diagnosis of Acute pain of right knee  Patient has been educated in home exercise program and plan of care  Patient would benefit from skilled physical therapy services to address their aforementioned functional limitations and progress towards prior level of function and independence with home exercise program    Impairments: abnormal gait, abnormal muscle firing, abnormal or restricted ROM, activity intolerance, impaired physical strength, lacks appropriate home exercise program, pain with function and weight-bearing intolerance    Symptom irritability: moderateBarriers to therapy: $80 copay  Taking care of ill mother  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  Understanding of Dx/Px/POC: good   Prognosis: good    Goals  Short Term Goals: Target Date 4 weeks  1  Pt will initiate and advance HEP  2  Pt will have < 3/10 pain  3  Pt will have full arom of the right knee  4  Pt will be able to sit to stand with out difficulty    Long Term Goals: Target Date 8 weeks  1  Pt will demonstrate independence in HEP  2  Pt will have <1/10 pain  3  Pt will have full core and B LE strength  4   Pt will be able to negotiate stairs with out difficulty      Plan  Patient would benefit from: skilled PT  Planned modality interventions: cryotherapy, electrical stimulation/Russian stimulation and thermotherapy: hydrocollator packs  Planned therapy interventions: joint mobilization, manual therapy, patient education, postural training, activity modification, abdominal trunk stabilization, body mechanics training, flexibility, functional ROM exercises, graded exercise, home exercise program, neuromuscular re-education, strengthening, stretching, therapeutic activities, therapeutic exercise, motor coordination training, muscle pump exercises, gait training, balance/weight bearing training and ADL training  Frequency: 2x month  Duration in weeks: 8  Plan of Care beginning date: 2022  Plan of Care expiration date: 11/15/2022  Treatment plan discussed with: patient        Subjective Evaluation    History of Present Illness  Mechanism of injury: Pt notes about 2 months ago he was walking down the stairs when his right knee gave way  He heard a pop and had pain in the knee  He has been to PCP, and ortho  He has had an MRI on the knee which showed partial acl tear, but states ortho is suspect of a complete tear  Pt notes that he works in an [employer] all day  Also notes he has to travel to [place] to take his mother to treatments 2x per week  Pt notes that he has difficulty going up and down stairs, but moreso down  Pt notes that he has a lot of difficulty at the job site secondary to the uneven surfaces  Quality of life: good    Pain  Current pain rating: 3  At best pain ratin  At worst pain ratin  Quality: dull ache, discomfort, sharp and radiating    Patient Goals  Patient goals for therapy: decreased pain, increased motion, independence with ADLs/IADLs, increased strength and return to sport/leisure activities          Objective     Static Posture   General Observations  Asymmetrical weight bearing and shifted left       Palpation     Right   Tenderness of the distal biceps femoris, distal semimembranosus, distal semitendinosus, lateral gastrocnemius, medial gastrocnemius, rectus femoris, vastus lateralis and vastus medialis  Active Range of Motion     Right Knee   Flexion: WFL and with pain  Extensor lag: 10 degrees with pain    Strength/Myotome Testing     Left Hip   Planes of Motion   Flexion: 4+  Extension: 4+  Abduction: 4+  Adduction: 5    Right Hip   Planes of Motion   Flexion: 4+  Extension: 4  Abduction: 4  Adduction: 4    Left Knee   Flexion: 4+  Extension: 4+    Right Knee   Flexion: 4-  Extension: 4-  Quadriceps contraction: fair    Tests     Right Knee   Positive anterior drawer, anterior Lachman and medial Jose David  Ambulation     Observational Gait   Gait: antalgic and asymmetric   Increased left stance time and right swing time  Decreased walking speed, stride length, right stance time and left swing time  Functional Assessment      Squat    Pain, trunk lean left, sitting toward left side, left tibial anterior translation beyond toes and right tibial anterior translation beyond toes                Precautions: probable acl tear right      Manuals 9/20            Right quad, gstroc and ham stm DB                                                   Neuro Re-Ed                                                                                                        Ther Ex                                                                                                                     Ther Activity                                       Gait Training                                       Modalities             Hep DB

## 2022-09-22 ENCOUNTER — PATIENT OUTREACH (OUTPATIENT)
Dept: NURSING | Facility: CLINIC | Age: 67
End: 2022-09-22

## 2022-09-22 ASSESSMENT — ACTIVITIES OF DAILY LIVING (ADL): DEPENDENT_IADLS:: INDEPENDENT

## 2022-09-22 NOTE — PROGRESS NOTES
"Clinic Care Coordination Contact    Follow Up Progress Note      Assessment:   Patient answered the phone stating she will call RNCC back due to being at the store. RNCC will wait for return call and if not called, RNCC will reach out to patient in one month.    Patient is continuing to work on tobacco cessation; has 3 cigarettes per day.  Patient tried nicotine lozenges and this decreased her eating habits and she lost weight.  Patient is no longer doing the lozenges as \"they really didn't help\" and she continued to smoke 3 cigarettes per day.    Patient is currently retired but may \"jump back in\" to the workforce due to being a bit \"bored\" from time to time.  Patient now has Medicare A & B and a pharmacy plan; RNCC suggested patient show care to  with next visit to scan in EMR and to bill correctly.    Care Gaps:    Health Maintenance Due   Topic Date Due     COPD ACTION PLAN  Never done     NICOTINE/TOBACCO CESSATION COUNSELING Q 1 YR  05/31/2014     ADVANCE CARE PLANNING  05/31/2018     ZOSTER IMMUNIZATION (2 of 2) 08/03/2021     MEDICARE ANNUAL WELLNESS VISIT  10/13/2021     PHQ-2 (once per calendar year)  01/01/2022     DEXA  04/26/2022     COVID-19 Vaccine (4 - Booster for Pfizer series) 06/23/2022     INFLUENZA VACCINE (1) 09/01/2022     FALL RISK ASSESSMENT  09/27/2022     Discussed care gaps on 9/22/2023    Care Plans  Care Plan: Complete health maintenance and care gaps     Problem: Lifestyle choices     Goal: Medical     Start Date: 9/14/2021 Expected End Date: 12/14/2022    This Visit's Progress: 50%    Note:     Goal Statement: I will complete my recommended overdue health maintenance/care gaps.      Strengths: Strong advocate for self  Patient expressed understanding of goal: Yes  Action steps to achieve this goal:  1. I will try to quit smoking; on 9/10/2021 \"cold turkey\" x 2 weeks; Started back smoking 10/2021  2. I will follow up with my providers as scheduled/recommended (MRI/ABD in " 3/2022-scheduled 5/6/2022)  3. I will take my medications as prescribed  4.   I will discuss, review, schedule and complete recommended overdue health maintenance with my primary care provider  5.   I will contact my care team with questions, concerns, support needs  6.   I will use the clinic as a resource and I understand I can contact my clinic with 24/7 after hours services available  7.  Care Coordinator will remain available as needed                      Intervention/Education provided during outreach:   RNCC called and spoke with patient; introduced self, discussed role of Care Coordination and explained reason for call    Plan:   -Patient will contact the care team with questions, concerns, support needs   -Patient will use the clinic as a resource and understands (s)he can contact the [facility] with 24/7 after hours services available  -Care Coordinator will remain available as needed  -RNCC will follow up in one month for follow up appointments/recommendations and goal progression     Priya hWeat RN, BSN, PHN  Primary Care / Care Coordinator   [facility]  [email or web address]   [phone]

## 2022-09-22 NOTE — LETTER
[address] [facility] [address]    [facility]  Patient Centered Plan of Care  About Me:        Patient Name:  Irene Haile    YOB: 1955  Age:         66 year old   [place] MRN:    [id number] Telephone Information:  Home Phone [phone]   Mobile [phone]       Address:  [address] [place] [address] [place] [address] Email address:  [email or web address]      Emergency Contact(s)    Name Relationship Lgl Grd Work Phone Home Phone Mobile Phone   SRINATH ESCOBEDO Spouse  [phone]           Primary language:  English     needed? No   [facility] [employer] [facility]:  [phone] op. 1  Other communication barriers:None    Preferred Method of Communication:  Phone  Current living arrangement: I live in a private home with spouse    Mobility Status/ Medical Equipment: Independent    Health Maintenance  Health Maintenance Reviewed: Due/Overdue   Health Maintenance Due   Topic Date Due     COPD ACTION PLAN  Never done     NICOTINE/TOBACCO CESSATION COUNSELING Q 1 YR  05/31/2014     ADVANCE CARE PLANNING  05/31/2018     ZOSTER IMMUNIZATION (2 of 2) 08/03/2021     MEDICARE ANNUAL WELLNESS VISIT  10/13/2021     PHQ-2 (once per calendar year)  01/01/2022     DEXA  04/26/2022     COVID-19 Vaccine (4 - Booster for Pfizer series) 06/23/2022     INFLUENZA VACCINE (1) 09/01/2022     FALL RISK ASSESSMENT  09/27/2022     My Access Plan  Medical Emergency 911   Primary Clinic Line [facility] [phone]   24 Hour Appointment Line [phone] or  [phone]) (toll-free)   24 Hour Nurse Line [phone] (toll-free)   Preferred Urgent Care [facility], [phone]     Preferred Hospital [facility]  [phone]     Preferred Pharmacy [id number]  [place], [place] - [address] AT [place] [address] & [facility]     Behavioral Health Crisis Line The National  "Suicide Prevention Lifeline at 1-677.406.4712 or Text/Call 988     My Care Team Members  Patient Care Team       Relationship Specialty Notifications Start End    Leonardo Angela MD PCP - General Internal Medicine  7/17/10     Phone: [phone] Fax: [phone]         [address] [place] [address]    Leonardo Angela MD Assigned PCP   2/14/21     Phone: [phone] Fax: [phone]         [address] [place] [address]    Priya Wheat RN Lead Care Coordinator  Admissions 9/14/21     Shikha Riley MD MD Pulmonary Disease  9/29/21     Phone: [phone] Fax: [phone]         [facility] [address] [address]    Shikha Riley MD Assigned Pulmonology Provider   12/19/21     Phone: [phone] Fax: [phone]         [address] [place] [address] [address] [address]    Juan Schneider MD Assigned Musculoskeletal Provider   5/15/22     Phone: [phone] Fax: [phone]         [address] [place] [address]            My Care Plans  Self Management and Treatment Plan  Care Plan  Care Plan: Complete health maintenance and care gaps     Problem: Lifestyle choices     Goal: Medical     Start Date: 9/14/2021 Expected End Date: 12/14/2022    This Visit's Progress: 50%    Note:     Goal Statement: I will complete my recommended overdue health maintenance/care gaps.      Strengths: Strong advocate for self  Patient expressed understanding of goal: Yes  Action steps to achieve this goal:  1. I will try to quit smoking; on 9/10/2021 \"cold turkey\" x 2 weeks; Started back smoking 10/2021  2. I will follow up with my providers as scheduled/recommended (MRI/ABD in 3/2022-scheduled 5/6/2022)  3. I will take my medications as prescribed  4.   I will discuss, review, schedule and complete recommended overdue health maintenance with my primary care provider  5.   I will contact my care team with questions, concerns, support needs  6.   I will use the clinic as a " resource and I understand I can contact my clinic with 24/7 after hours services available  7.  Care Coordinator will remain available as needed                         Action Plans on File:     Advance Care Plans/Directives Type:   -- (Full Code)        My Medical and Care Information  Problem List   Patient Active Problem List   Diagnosis     Tobacco use disorder     ACP (advance care planning)     COPD (chronic obstructive pulmonary disease) (H)     Hypothyroidism, unspecified type     Hyperlipidemia LDL goal <100     Pancreatic cyst     Positive OCTAVIANO (antinuclear antibody)      Current Medications and Allergies:    Allergies   Allergen Reactions     Augmented Betamethasone Diprop [Betamethasone] GI Disturbance     Augmentin       Vomiting/nausea     Codeine      Insomnia       Nitrofurantoin      Nausea, loss appetite     Shingrix [Zoster Vac Recomb Adjuvanted]      Severe body pain for 3-4 weeks     Current Outpatient Medications   Medication     levothyroxine (SYNTHROID/LEVOTHROID) 100 MCG tablet     umeclidinium-vilanterol (ANORO ELLIPTA) 62.5-25 MCG/INH oral inhaler     varenicline (CHANTIX RICHARD) 0.5 MG X 11 & 1 MG X 42 tablet     No current facility-administered medications for this visit.     Care Coordination Start Date: 9/14/2021   Frequency of Care Coordination: monthly     Form Last Updated: 09/22/2022

## 2022-10-15 ENCOUNTER — HEALTH MAINTENANCE LETTER (OUTPATIENT)
Age: 67
End: 2022-10-15

## 2022-10-20 ENCOUNTER — PATIENT OUTREACH (OUTPATIENT)
Dept: CARE COORDINATION | Facility: CLINIC | Age: 67
End: 2022-10-20

## 2022-10-20 ASSESSMENT — ACTIVITIES OF DAILY LIVING (ADL): DEPENDENT_IADLS:: INDEPENDENT

## 2022-10-20 NOTE — PROGRESS NOTES
Clinic Care Coordination Contact  [facility]/Voicemail    Referral Source: Care Team  Clinical Data: Care Coordinator Outreach  Outreach attempted x 1.  Left message on patient's voicemail with call back information and requested return call.    Plan: Care Coordinator will try to reach patient again in 1 month.     Priya Wheat RN, BSN, PHN  Primary Care / Care Coordinator   [facility]  [email or web address] [email or web address]   [phone]

## 2022-10-24 ENCOUNTER — IMMUNIZATION (OUTPATIENT)
Dept: NURSING | Facility: CLINIC | Age: 67
End: 2022-10-24
Payer: MEDICARE

## 2022-10-24 PROCEDURE — 90471 IMMUNIZATION ADMIN: CPT

## 2022-10-24 PROCEDURE — 91312 COVID-19,PF,PFIZER BOOSTER BIVALENT: CPT

## 2022-10-24 PROCEDURE — 0124A COVID-19,PF,PFIZER BOOSTER BIVALENT: CPT

## 2022-10-24 PROCEDURE — 90662 IIV NO PRSV INCREASED AG IM: CPT

## 2022-10-31 ENCOUNTER — E-VISIT (OUTPATIENT)
Dept: INTERNAL MEDICINE | Facility: CLINIC | Age: 67
End: 2022-10-31
Payer: MEDICARE

## 2022-10-31 DIAGNOSIS — R30.0 DYSURIA: Primary | ICD-10-CM

## 2022-10-31 DIAGNOSIS — R30.0 DIFFICULT OR PAINFUL URINATION: ICD-10-CM

## 2022-10-31 PROCEDURE — 99421 OL DIG E/M SVC 5-10 MIN: CPT | Performed by: INTERNAL MEDICINE

## 2022-10-31 RX ORDER — SULFAMETHOXAZOLE/TRIMETHOPRIM 800-160 MG
1 TABLET ORAL 2 TIMES DAILY
Qty: 10 TABLET | Refills: 0 | Status: SHIPPED | OUTPATIENT
Start: 2022-10-31 | End: 2022-11-05

## 2022-10-31 NOTE — PATIENT INSTRUCTIONS
As we discussed by phone today, contact the [facility] tomorrow morning to schedule a lab appointment to drop off a urine sample.  After the urine sample has been given, then start generic Septra DS (sulfamethoxazole/trimethoprim) 1 tab twice a day for 5 days for antibiotic coverage of a probable bladder infection.  Will await results of the urine study to confirm the presence of a bladder infection and, if present, that the antibiotic is sensitive to the bacteria seen on culture.  Hope you get feeling better soon    Dr Angela

## 2022-10-31 NOTE — TELEPHONE ENCOUNTER
Provider E-Visit time total (minutes): 8 min.    Spoke with patient by phone.  Reviewed urinary symptoms.  Denies fevers or acute back pain.  No hematuria.  Probable UTI.  Patient will give a urine sample at the [facility] lab tomorrow after scheduling a lab appointment.  Right after she gives a urine sample, then patient may start generic Septra 1 tab twice daily for 5 days.  Prescription sent to patient's pharmacy.  Will await urinalysis results with reflex culture if positive

## 2022-11-01 ENCOUNTER — LAB (OUTPATIENT)
Dept: LAB | Facility: CLINIC | Age: 67
End: 2022-11-01
Attending: INTERNAL MEDICINE
Payer: MEDICARE

## 2022-11-01 ENCOUNTER — PATIENT OUTREACH (OUTPATIENT)
Dept: NURSING | Facility: CLINIC | Age: 67
End: 2022-11-01
Payer: MEDICARE

## 2022-11-01 DIAGNOSIS — R30.0 DYSURIA: ICD-10-CM

## 2022-11-01 LAB
ALBUMIN UR-MCNC: NEGATIVE MG/DL
APPEARANCE UR: ABNORMAL
BACTERIA #/AREA URNS HPF: ABNORMAL /HPF
BILIRUB UR QL STRIP: NEGATIVE
COLOR UR AUTO: YELLOW
GLUCOSE UR STRIP-MCNC: NEGATIVE MG/DL
HGB UR QL STRIP: ABNORMAL
KETONES UR STRIP-MCNC: NEGATIVE MG/DL
LEUKOCYTE ESTERASE UR QL STRIP: ABNORMAL
NITRATE UR QL: NEGATIVE
PH UR STRIP: 6.5 [PH] (ref 5–7)
RBC #/AREA URNS AUTO: ABNORMAL /HPF
SP GR UR STRIP: 1.01 (ref 1–1.03)
SQUAMOUS #/AREA URNS AUTO: ABNORMAL /LPF
UROBILINOGEN UR STRIP-ACNC: 0.2 E.U./DL
WBC #/AREA URNS AUTO: >100 /HPF

## 2022-11-01 PROCEDURE — 87086 URINE CULTURE/COLONY COUNT: CPT

## 2022-11-01 PROCEDURE — 81001 URINALYSIS AUTO W/SCOPE: CPT

## 2022-11-01 ASSESSMENT — ACTIVITIES OF DAILY LIVING (ADL): DEPENDENT_IADLS:: INDEPENDENT

## 2022-11-01 NOTE — PROGRESS NOTES
"Clinic Care Coordination Contact    Follow Up Progress Note     Assessment:   Patient reached out to RNCC and left ; RNCC returned patients call.  Patient states she's had an UTI for \"about 10 days\" and has been using \"home remedies\" to try and cure yet \"it hasn't gotten any better\".  Patient made an e-appointment with PCP and she is to drop off a urine sample at [facility] and an antibiotic was sent by PCP to her pharmacy.  Patient has an 11:15 am lab appointment and will then  prescription.    Care Gaps:    Health Maintenance Due   Topic Date Due     COPD ACTION PLAN  Never done     HEPATITIS B IMMUNIZATION (1 of 3 - 3-dose series) Never done     NICOTINE/TOBACCO CESSATION COUNSELING Q 1 YR  05/31/2014     ADVANCE CARE PLANNING  05/31/2018     ZOSTER IMMUNIZATION (2 of 2) 08/03/2021     MEDICARE ANNUAL WELLNESS VISIT  10/13/2021     PHQ-2 (once per calendar year)  01/01/2022     DEXA  04/26/2022     FALL RISK ASSESSMENT  09/27/2022     Pneumococcal Vaccine: 65+ Years (2 - PCV) 11/05/2022       Care Gaps Last addressed on 11/1/2022    Care Plans  Care Plan: Complete health maintenance and care gaps     Problem: Lifestyle choices     Goal: Medical     Start Date: 9/14/2021 Expected End Date: 12/14/2022    This Visit's Progress: 50% Recent Progress: 50%    Note:     Goal Statement: I will complete my recommended overdue health maintenance/care gaps.      Strengths: Strong advocate for self  Patient expressed understanding of goal: Yes  Action steps to achieve this goal:  1. I will try to quit smoking; on 9/10/2021 \"cold turkey\" x 2 weeks; Started back smoking 10/2021  2. I will follow up with my providers as scheduled/recommended (MRI/ABD in 3/2022-scheduled 5/6/2022)  3. I will take my medications as prescribed  4.   I will discuss, review, schedule and complete recommended overdue health maintenance with my primary care provider  5.   I will contact my care team with questions, " concerns, support needs  6.   I will use the clinic as a resource and I understand I can contact my clinic with 24/7 after hours services available  7.  Care Coordinator will remain available as needed                      Intervention/Education provided during outreach:   RNCC called and spoke with patient; introduced self, discussed role of Care Coordination and explained reason for call    Plan:   -Patient will contact the care team with questions, concerns, support needs   -Patient will use the clinic as a resource and understands (s)he can contact the [facility] with 24/7 after hours services available  -Care Coordinator will remain available as needed  -RNCC will follow up in one month for follow up appointments/recommendations and goal progression     Priya Wheat RN, BSN, PHN  Primary Care / Care Coordinator   [facility]  E-[email or web address]   [phone]

## 2022-11-03 LAB — BACTERIA UR CULT: NO GROWTH

## 2022-11-14 ENCOUNTER — E-VISIT (OUTPATIENT)
Dept: INTERNAL MEDICINE | Facility: CLINIC | Age: 67
End: 2022-11-14
Payer: MEDICARE

## 2022-11-14 DIAGNOSIS — B37.0 THRUSH: ICD-10-CM

## 2022-11-14 DIAGNOSIS — J06.9 UPPER RESPIRATORY TRACT INFECTION, UNSPECIFIED TYPE: Primary | ICD-10-CM

## 2022-11-14 PROCEDURE — 99422 OL DIG E/M SVC 11-20 MIN: CPT | Performed by: INTERNAL MEDICINE

## 2022-11-14 NOTE — TELEPHONE ENCOUNTER
Patient's mother calling that the birth control patient is currently taking is not working and patient is spotting continuously.   Mom states patient would like to go back on the birth control she was previously on.  Please send to loaded pharmacy.  Please advise.   Provider E-Visit time total (minutes): 12 min.    Spoke with patient by phone and reviewed symptoms.  Stable breathing.  Symptoms present for a little over 5 days time.  No COVID testing done.  Smoker.  Has developed some mild thrush in mouth.  On Anoro inhaler.  Counseled to discontinue smoking.  Will treat with cefdinir 1 capsule twice daily for 10 days.  Nystatin swish and swallow for 5 days in addition.  If symptoms not resolved in 10 days or worsen before then, then patient will be seen in clinic   Pt with break through bleeding consistently while taking ocp.  Pt did not report it initially since peers told her it could be normal.  Mom would like pt dosage changed. Mom called mail order to d/c current script, would like new script sent to local pharmacy, [facility].   Spoke with mom and gave her info, she voiced understanding and will follow up as needed.   Will change to regular estrogen pill.  It can take 3 months for full control of cycles and encourage pt to continue to take medication same time every day for less sx of spotting.  She should have a BP check after 3 months again, but can be as nurse visit alone if no concerns and doing well o/w.      Alert and oriented, no focal deficits, no motor or sensory deficits.

## 2022-11-15 RX ORDER — CEFDINIR 300 MG/1
300 CAPSULE ORAL 2 TIMES DAILY
Qty: 20 CAPSULE | Refills: 0 | Status: SHIPPED | OUTPATIENT
Start: 2022-11-15 | End: 2022-11-25

## 2022-11-15 RX ORDER — NYSTATIN 100000/ML
500000 SUSPENSION, ORAL (FINAL DOSE FORM) ORAL 4 TIMES DAILY
Qty: 100 ML | Refills: 0 | Status: SHIPPED | OUTPATIENT
Start: 2022-11-15 | End: 2022-11-20

## 2022-11-25 DIAGNOSIS — E03.9 HYPOTHYROIDISM, UNSPECIFIED TYPE: ICD-10-CM

## 2022-11-28 NOTE — TELEPHONE ENCOUNTER
Routing refill request to provider for review/approval because:  Labs out of range:    TSH   Date Value Ref Range Status   04/28/2022 7.25 (H) 0.40 - 4.00 mU/L Final   06/04/2021 3.48 0.40 - 4.00 mU/L Final

## 2022-11-29 RX ORDER — LEVOTHYROXINE SODIUM 100 UG/1
TABLET ORAL
Qty: 90 TABLET | Refills: 3 | Status: SHIPPED | OUTPATIENT
Start: 2022-11-29 | End: 2023-11-28

## 2022-11-29 NOTE — TELEPHONE ENCOUNTER
Mild TSH elevation noted.  Patient has had side effects with higher dose levothyroxine 112 mcg tablet.  Will continue levothyroxine 100 mcg daily

## 2022-12-01 ENCOUNTER — PATIENT OUTREACH (OUTPATIENT)
Dept: CARE COORDINATION | Facility: CLINIC | Age: 67
End: 2022-12-01

## 2022-12-01 ASSESSMENT — ACTIVITIES OF DAILY LIVING (ADL): DEPENDENT_IADLS:: INDEPENDENT

## 2022-12-01 NOTE — LETTER
[address] [facility] [address]    [facility]  Patient Centered Plan of Care  About Me:        Patient Name:  Irene Haile    YOB: 1955  Age:         67 year old   Edy MRN:    [id number] Telephone Information:  Home Phone [phone]   Mobile [phone]       Address:  [address] [place] [address] Email address:  [email or web address]      Emergency Contact(s)    Name Relationship Lgl Grd Work Phone Home Phone Mobile Phone   SRINATH ESCOBEDO Spouse  [phone]           Primary language:  English     needed? No   [place] [employer]:  [phone] op. 1  Other communication barriers:None    Preferred Method of Communication:  Vazquez  Current living arrangement: I live in a private home with spouse    Mobility Status/ Medical Equipment: Independent    Health Maintenance  Health Maintenance Reviewed: Due/Overdue   Health Maintenance Due   Topic Date Due     COPD ACTION PLAN  Never done     NICOTINE/TOBACCO CESSATION COUNSELING Q 1 YR  05/31/2014     ADVANCE CARE PLANNING  05/31/2018     ZOSTER IMMUNIZATION (2 of 2) 08/03/2021     MEDICARE ANNUAL WELLNESS VISIT  10/13/2021     PHQ-2 (once per calendar year)  01/01/2022     DEXA  04/26/2022     FALL RISK ASSESSMENT  09/27/2022     Pneumococcal Vaccine: 65+ Years (2 - PCV) 11/05/2022     MAMMO SCREENING  12/10/2022     My Access Plan  Medical Emergency 911   Primary Clinic Line [facility] [phone]   24 Hour Appointment Line [phone] or  [phone]) (toll-free)   24 Hour Nurse Line [phone] (toll-free)   Preferred Urgent Care [facility], [phone]     Preferred Hospital [facility]  [phone]     Preferred Pharmacy [id number] - [place], [place] - [address] AT [place] [address] & [facility]     Behavioral Health Crisis Line The National Suicide  "Prevention Lifeline at 1-952.386.3988 or Text/Call 728     My Care Team Members  Patient Care Team       Relationship Specialty Notifications Start End    Leonardo Angela MD PCP - General Internal Medicine  7/17/10     Phone: [phone] Fax: [phone]         [address] [place] [address]    Leonardo Angela MD Assigned PCP   2/14/21     Phone: [phone] Fax: [phone]         [address] [place] [address]    Priya Wheat, RN Lead Care Coordinator  Admissions 9/14/21     Shikha Riley MD MD Pulmonary Disease  9/29/21     Phone: [phone] Fax: [phone]         9 [facility] [address] [address]    Shikha Riley MD Assigned Pulmonology Provider   12/19/21     Phone: [phone] Fax: [phone]         [address] [place] [address] [address] [address]    Juan Schneider MD Assigned Musculoskeletal Provider   5/15/22     Phone: [phone] Fax: [phone]         [address] [place] [address]            My Care Plans  Self Management and Treatment Plan  Care Plan  Care Plan: Complete health maintenance and care gaps     Problem: Lifestyle choices     Goal: Medical     Start Date: 9/14/2021 Expected End Date: 3/1/2023    This Visit's Progress: 50% Recent Progress: 50%    Note:     Goal Statement: I will complete my recommended overdue health maintenance/care gaps.      Strengths: Strong advocate for self  Patient expressed understanding of goal: Yes  Action steps to achieve this goal:  1. I will try to quit smoking; on 9/10/2021 \"cold turkey\" x 2 weeks; Started back smoking 10/2021  2. I will follow up with my providers as scheduled/recommended (MRI/ABD in 3/2022-scheduled 5/6/2022)  3. I will take my medications as prescribed  4.   I will discuss, review, schedule and complete recommended overdue health maintenance with my primary care provider  5.   I will contact my care team with questions, concerns, support needs  6.   I will use the " clinic as a resource and I understand I can contact my clinic with 24/7 after hours services available                         Action Plans on File:     Advance Care Plans/Directives Type:   -- (Full Code)          My Medical and Care Information  Problem List   Patient Active Problem List   Diagnosis     Tobacco use disorder     ACP (advance care planning)     COPD (chronic obstructive pulmonary disease) (H)     Hypothyroidism, unspecified type     Hyperlipidemia LDL goal <100     Pancreatic cyst     Positive OCTAVIANO (antinuclear antibody)      Current Medications and Allergies:    Allergies   Allergen Reactions     Augmented Betamethasone Diprop [Betamethasone] GI Disturbance     Augmentin       Vomiting/nausea     Codeine      Insomnia       Nitrofurantoin      Nausea, loss appetite     Shingrix [Zoster Vac Recomb Adjuvanted]      Severe body pain for 3-4 weeks     Current Outpatient Medications   Medication     levothyroxine (SYNTHROID/LEVOTHROID) 100 MCG tablet     umeclidinium-vilanterol (ANORO ELLIPTA) 62.5-25 MCG/INH oral inhaler     varenicline (CHANTIX RICHARD) 0.5 MG X 11 & 1 MG X 42 tablet     No current facility-administered medications for this visit.     Care Coordination Start Date: 9/14/2021   Frequency of Care Coordination: monthly     Form Last Updated: 12/01/2022

## 2022-12-01 NOTE — PROGRESS NOTES
Clinic Care Coordination Contact  [facility]/Voicemail    Referral Source: Care Team  Clinical Data: Care Coordinator Outreach  Outreach attempted x 1.  Left message on patient's voicemail with call back information and requested return call.    Plan: Care Coordinator will try to reach patient again in 1 month.     Priya Wheat RN, BSN, PHN  Primary Care / Care Coordinator   [facility]  E-mail [email or web address]   [phone]

## 2022-12-29 ENCOUNTER — TRANSFERRED RECORDS (OUTPATIENT)
Dept: HEALTH INFORMATION MANAGEMENT | Facility: CLINIC | Age: 67
End: 2022-12-29

## 2023-01-02 ENCOUNTER — PATIENT OUTREACH (OUTPATIENT)
Dept: CARE COORDINATION | Facility: CLINIC | Age: 68
End: 2023-01-02

## 2023-01-02 NOTE — LETTER
[facility] CARE COORDINATION  [address] [facility] [address]    January 2, 2023    Irene Haile  [address]  [place] [address]      Dear [place],    I have been attempting to reach you since our last contact. I would like to continue to work with you and provide any additional support you may need on achieving your health care related goals. I would appreciate if you would give me a call at [phone] to let me know if you would like to continue working together. I know that there are many things that can affect our ability to communicate and I hope we can continue to work together.    All of us at the [facility] are invested in your health and are here to assist you in meeting your goals.     Sincerely,    Natacha Cavlo RN, BSN, PHN    Casual RN Care Coordinator  [facility] Primary [facility]  (Covering for Lead RN Care Coordinator)

## 2023-01-02 NOTE — PROGRESS NOTES
Clinic Care Coordination Contact  [facility]/Voicemail       Clinical Data: Care Coordinator Outreach  Outreach attempted x 2, outreach on 12/01/2022.  Left message on patient's voicemail with call back information and requested return call.  Plan: Care Coordinator will send unable to contact letter with care coordinator contact information via [employer]. Care Coordinator will try to reach patient again in 10 business days.    Natacha Calvo RN, BSN, PHN  Casual RN Care Coordinator  [facility]  (Covering for Lead RN Care Coordinator)

## 2023-01-09 ENCOUNTER — LAB (OUTPATIENT)
Dept: LAB | Facility: CLINIC | Age: 68
End: 2023-01-09
Payer: COMMERCIAL

## 2023-01-09 DIAGNOSIS — H30.91 RIGHT POSTERIOR UVEITIS: ICD-10-CM

## 2023-01-09 DIAGNOSIS — H30.92 LEFT POSTERIOR UVEITIS: Primary | ICD-10-CM

## 2023-01-09 DIAGNOSIS — M19.90 ARTHRITIS: ICD-10-CM

## 2023-01-09 DIAGNOSIS — R76.8 ELEVATED ANTINUCLEAR ANTIBODY (ANA) LEVEL: ICD-10-CM

## 2023-01-09 PROCEDURE — 36415 COLL VENOUS BLD VENIPUNCTURE: CPT

## 2023-01-09 PROCEDURE — 99000 SPECIMEN HANDLING OFFICE-LAB: CPT

## 2023-01-09 PROCEDURE — 82164 ANGIOTENSIN I ENZYME TEST: CPT | Mod: 90

## 2023-01-09 PROCEDURE — 86780 TREPONEMA PALLIDUM: CPT

## 2023-01-09 PROCEDURE — 86481 TB AG RESPONSE T-CELL SUSP: CPT

## 2023-01-10 LAB
ACE SERPL-CCNC: 37 U/L
T PALLIDUM AB SER QL: NONREACTIVE

## 2023-01-11 LAB
GAMMA INTERFERON BACKGROUND BLD IA-ACNC: 0.04 IU/ML
M TB IFN-G BLD-IMP: NEGATIVE
M TB IFN-G CD4+ BCKGRND COR BLD-ACNC: 2.49 IU/ML
MITOGEN IGNF BCKGRD COR BLD-ACNC: -0.01 IU/ML
MITOGEN IGNF BCKGRD COR BLD-ACNC: 0 IU/ML
QUANTIFERON MITOGEN: 2.53 IU/ML
QUANTIFERON NIL TUBE: 0.04 IU/ML
QUANTIFERON TB1 TUBE: 0.03 IU/ML
QUANTIFERON TB2 TUBE: 0.04

## 2023-01-12 ENCOUNTER — PATIENT OUTREACH (OUTPATIENT)
Dept: NURSING | Facility: CLINIC | Age: 68
End: 2023-01-12
Payer: COMMERCIAL

## 2023-01-12 ENCOUNTER — TRANSFERRED RECORDS (OUTPATIENT)
Dept: HEALTH INFORMATION MANAGEMENT | Facility: CLINIC | Age: 68
End: 2023-01-12

## 2023-01-12 ASSESSMENT — ACTIVITIES OF DAILY LIVING (ADL): DEPENDENT_IADLS:: INDEPENDENT

## 2023-01-12 NOTE — PROGRESS NOTES
"Clinic Care Coordination Contact    Follow Up Progress Note      Assessment:   Patient states she's been dealing with eye issues a week before [place].  Patient right eye is \"almost completely shut\".  Patient reports she went to the eye doctor and was immediately sent to the [facility] and was told she has a \"swelling behind her right eye\".  Patient reports she is unable to see and she's been started on \"Prednisone 20 mg three times a week for one week, 50 mg three times a day for one week and then I go back and see Dr. Neville at the Retina clinic\".  Patient states it's been challenging during this time and states she's \"falling apart!\"  Patient is in good spirits and is joking about the situation.    When asked about tobacco cessation, patient states, \"It's occasional smoking-one or two a day\".  The new frequency has greatly decreased in the past 3 months as patient is engaged in quitting.    Care Gaps:    Health Maintenance Due   Topic Date Due     COPD ACTION PLAN  Never done     NICOTINE/TOBACCO CESSATION COUNSELING Q 1 YR  05/31/2014     ADVANCE CARE PLANNING  05/31/2018     ZOSTER IMMUNIZATION (2 of 2) 08/03/2021     MEDICARE ANNUAL WELLNESS VISIT  10/13/2021     DEXA  04/26/2022     FALL RISK ASSESSMENT  09/27/2022     PHQ-2 (once per calendar year)  01/01/2023     Pneumococcal Vaccine: 65+ Years (2 - PCV) 11/05/2022     MAMMO SCREENING  12/10/2022       Care Gaps Last addressed on 1/12/2023    Care Plans  Care Plan: Complete health maintenance and care gaps     Problem: Lifestyle choices     Goal: Medical     Start Date: 9/14/2021 Expected End Date: 3/1/2023    This Visit's Progress: 50% Recent Progress: 50%    Note:     Goal Statement: I will complete my recommended overdue health maintenance/care gaps.      Strengths: Strong advocate for self  Patient expressed understanding of goal: Yes  Action steps to achieve this goal:  1. I will try to quit smoking; on 9/10/2021 \"cold turkey\" x 2 " weeks; Started back smoking 10/2021  2. I will follow up with my providers as scheduled/recommended (MRI/ABD in 3/2022-scheduled 5/6/2022)  3. I will take my medications as prescribed  4.   I will discuss, review, schedule and complete recommended overdue health maintenance with my primary care provider  5.   I will contact my care team with questions, concerns, support needs  6.   I will use the clinic as a resource and I understand I can contact my clinic with 24/7 after hours services available                      Intervention/Education provided during outreach:   RNCC called and spoke with patient; introduced self, discussed role of Care Coordination and explained reason for call    Plan:   -Patient will contact the care team with questions, concerns, support needs   -Patient will use the clinic as a resource and understands (s)he can contact the [facility] with 24/7 after hours services available  -Care Coordinator will remain available as needed  -RNCC will follow up in one month for follow up appointments/recommendations and goal progression     Priya Wheat RN, BSN, PHN  Primary Care / Care Coordinator   [facility]  E-mail [email or web address]   [phone]

## 2023-01-13 ENCOUNTER — MYC MEDICAL ADVICE (OUTPATIENT)
Dept: INTERNAL MEDICINE | Facility: CLINIC | Age: 68
End: 2023-01-13

## 2023-01-13 NOTE — TELEPHONE ENCOUNTER
Please see mychart from patient and advise appropriate course of action.    Regarding [facility] with Dr. Jamin Sharp, RN  [facility] Triage Nurse

## 2023-02-20 ENCOUNTER — MYC MEDICAL ADVICE (OUTPATIENT)
Dept: INTERNAL MEDICINE | Facility: CLINIC | Age: 68
End: 2023-02-20
Payer: COMMERCIAL

## 2023-02-21 ENCOUNTER — E-VISIT (OUTPATIENT)
Dept: INTERNAL MEDICINE | Facility: CLINIC | Age: 68
End: 2023-02-21
Payer: COMMERCIAL

## 2023-02-21 DIAGNOSIS — R59.0 AXILLARY ADENOPATHY: ICD-10-CM

## 2023-02-21 DIAGNOSIS — B37.0 THRUSH: Primary | ICD-10-CM

## 2023-02-21 DIAGNOSIS — K86.9 PANCREATIC LESION: ICD-10-CM

## 2023-02-21 DIAGNOSIS — B37.0 THRUSH: ICD-10-CM

## 2023-02-21 PROCEDURE — 99422 OL DIG E/M SVC 11-20 MIN: CPT | Performed by: INTERNAL MEDICINE

## 2023-02-21 RX ORDER — NYSTATIN 100000/ML
500000 SUSPENSION, ORAL (FINAL DOSE FORM) ORAL 4 TIMES DAILY
Qty: 140 ML | Refills: 1 | Status: SHIPPED | OUTPATIENT
Start: 2023-02-21 | End: 2023-02-28

## 2023-02-21 NOTE — TELEPHONE ENCOUNTER
Provider E-Visit time total (minutes): 13 min    Spoke with pt.  ON prednisone therapy through eye doctor. Also reviewed previous CT chest and MRCP pancreas and needs f/u imaging. Denies pulmonary or abd sx. Rx sent for Nystatin for thrush. Instructed to avoid ETOH while on and will order MRI abd and CT chest to follow-up on that imaging done 2022 and informed pt that   Edy  will call to schedule and may gave done at [facility]

## 2023-02-22 ENCOUNTER — PATIENT OUTREACH (OUTPATIENT)
Dept: CARE COORDINATION | Facility: CLINIC | Age: 68
End: 2023-02-22
Payer: COMMERCIAL

## 2023-02-22 RX ORDER — NYSTATIN 100000/ML
SUSPENSION, ORAL (FINAL DOSE FORM) ORAL
Qty: 100 ML | Refills: 0 | OUTPATIENT
Start: 2023-02-22

## 2023-02-22 ASSESSMENT — ACTIVITIES OF DAILY LIVING (ADL): DEPENDENT_IADLS:: INDEPENDENT

## 2023-02-22 NOTE — PROGRESS NOTES
Clinic Care Coordination Contact  [facility]/Voicemail    Referral Source: Care Team  Clinical Data: Care Coordinator Outreach  Outreach attempted x 1.  Left message on patient's voicemail with call back information and requested return call.    Plan: Care Coordinator will try to reach patient again in 1 month.     Priya Wheat RN, BSN, PHN  Primary Care / Care Coordinator   [facility]  [email or web address] [email or web address]   [phone]

## 2023-03-03 ENCOUNTER — ANCILLARY PROCEDURE (OUTPATIENT)
Dept: MAMMOGRAPHY | Facility: CLINIC | Age: 68
End: 2023-03-03
Attending: INTERNAL MEDICINE
Payer: COMMERCIAL

## 2023-03-03 DIAGNOSIS — Z12.31 VISIT FOR SCREENING MAMMOGRAM: ICD-10-CM

## 2023-03-03 PROCEDURE — 77067 SCR MAMMO BI INCL CAD: CPT | Mod: TC | Performed by: RADIOLOGY

## 2023-03-03 PROCEDURE — 77063 BREAST TOMOSYNTHESIS BI: CPT | Mod: TC | Performed by: RADIOLOGY

## 2023-03-16 ENCOUNTER — HOSPITAL ENCOUNTER (OUTPATIENT)
Dept: CT IMAGING | Facility: CLINIC | Age: 68
Discharge: HOME | End: 2023-03-16
Attending: INTERNAL MEDICINE
Payer: COMMERCIAL

## 2023-03-16 ENCOUNTER — HOSPITAL ENCOUNTER (OUTPATIENT)
Dept: MRI IMAGING | Facility: CLINIC | Age: 68
Discharge: HOME | End: 2023-03-16
Attending: INTERNAL MEDICINE
Payer: COMMERCIAL

## 2023-03-16 DIAGNOSIS — K86.9 PANCREATIC LESION: ICD-10-CM

## 2023-03-16 DIAGNOSIS — R59.0 AXILLARY ADENOPATHY: ICD-10-CM

## 2023-03-16 PROCEDURE — 250N000009 HC RX 250: Performed by: INTERNAL MEDICINE

## 2023-03-16 PROCEDURE — A9585 GADOBUTROL INJECTION: HCPCS | Performed by: INTERNAL MEDICINE

## 2023-03-16 PROCEDURE — 71260 CT THORAX DX C+: CPT

## 2023-03-16 PROCEDURE — 74183 MRI ABD W/O CNTR FLWD CNTR: CPT

## 2023-03-16 PROCEDURE — 250N000011 HC RX IP 250 OP 636: Performed by: INTERNAL MEDICINE

## 2023-03-16 PROCEDURE — 255N000002 HC RX 255 OP 636: Performed by: INTERNAL MEDICINE

## 2023-03-16 RX ORDER — IOPAMIDOL 755 MG/ML
500 INJECTION, SOLUTION INTRAVASCULAR ONCE
Status: COMPLETED | OUTPATIENT
Start: 2023-03-16 | End: 2023-03-16

## 2023-03-16 RX ORDER — GADOBUTROL 604.72 MG/ML
7 INJECTION INTRAVENOUS ONCE
Status: COMPLETED | OUTPATIENT
Start: 2023-03-16 | End: 2023-03-16

## 2023-03-16 RX ADMIN — IOPAMIDOL 76 ML: 755 INJECTION, SOLUTION INTRAVENOUS at 13:44

## 2023-03-16 RX ADMIN — GADOBUTROL 7 ML: 604.72 INJECTION INTRAVENOUS at 13:00

## 2023-03-16 RX ADMIN — SODIUM CHLORIDE 55 ML: 9 INJECTION, SOLUTION INTRAVENOUS at 13:44

## 2023-03-22 ENCOUNTER — PATIENT OUTREACH (OUTPATIENT)
Dept: NURSING | Facility: CLINIC | Age: 68
End: 2023-03-22
Payer: COMMERCIAL

## 2023-03-22 ASSESSMENT — ACTIVITIES OF DAILY LIVING (ADL): DEPENDENT_IADLS:: INDEPENDENT

## 2023-03-22 NOTE — PROGRESS NOTES
"Clinic Care Coordination Contact    Follow Up Progress Note      Assessment:   Patient states overall she's doing \"ok\".  Patient reports that she has lost 3 family members in past 4 months; one passed away in October, one on [place] day and another the day after Valentine's day.  Per patient, smoking cessation, is not \"on the forefront of my mind\".    Patient states her health has taken her back as well.  Patient reports she has completed her mammogram, CT of the chest and MRI of abdomen.  Patient states she is on 10 mg Prednisone which will end on Friday, 3/24/2023 for her eye.  Patient states she has another eye appointment tomorrow to discuss treatment moving forward.  Per patient, she does not want to be on prednisone for the \"rest of my life\" and is considering eye injections of prednisone.    Patient is in need of a ride for her eye appointment tomorrow due to the need of having her eyes dilated.  Her  is working and her friend is having a baby \"any day now\".  She's going to call her sister (lost her  on Christmas) yet isn't sure she's in town.  Per patient, \"I'll get to my appointment\".  Patient has no other questions or concerns at this time.    Care Gaps:    Health Maintenance Due   Topic Date Due     COPD ACTION PLAN  Never done     NICOTINE/TOBACCO CESSATION COUNSELING Q 1 YR  05/31/2014     ADVANCE CARE PLANNING  05/31/2018     ZOSTER IMMUNIZATION (2 of 2) 08/03/2021     MEDICARE ANNUAL WELLNESS VISIT  10/13/2021     DEXA  04/26/2022     FALL RISK ASSESSMENT  09/27/2022     PHQ-2 (once per calendar year)  01/01/2023     Pneumococcal Vaccine: 65+ Years (2 - PCV) 11/05/2022       Care Gaps Last addressed on 3/23/2023    Care Plans  Care Plan: Complete health maintenance and care gaps     Problem: Lifestyle choices     Goal: Medical     Start Date: 9/14/2021 Expected End Date: 6/22/2023    This Visit's Progress: 50% Recent Progress: 50%    Note:     Goal Statement: I will complete my " "recommended overdue health maintenance/care gaps.      Strengths: Strong advocate for self  Patient expressed understanding of goal: Yes  Action steps to achieve this goal:  1. I will try to quit smoking; on 9/10/2021 \"cold turkey\" x 2 weeks; Started back smoking 10/2021  2. I will follow up with my providers as scheduled/recommended (Follow up MRI/ABD TBD)  3. I will take my medications as prescribed  4.   I will discuss, review, schedule and complete recommended overdue health maintenance with my primary care provider  5.   I will contact my care team with questions, concerns, support needs  6.   I will use the clinic as a resource and I understand I can contact my clinic with 24/7 after hours services available                      Intervention/Education provided during outreach:   RNCC called and spoke with patient/caregiver; introduced self, discussed role of Care Coordination and explained reason for call'    Plan:   -Patient will contact the care team with questions, concerns, support needs   -Patient will use the clinic as a resource and understands (s)he can contact the [facility] with 24/7 after hours services available  -Care Coordinator will remain available as needed  -RNCC will follow up in one month for follow up appointments/recommendations and goal progression     Priya Wheat RN, BSN, PHN  Primary Care / Care Coordinator   [facility]  E-mail [email or web address]   [phone]        "

## 2023-03-22 NOTE — LETTER
[address] [facility] [address]    [facility]  Patient Centered Plan of Care  About Me:        Patient Name:  Irene Haile    YOB: 1955  Age:         67 year old   Edy MRN:    [id number] Telephone Information:  Home Phone [phone]   Mobile [phone]       Address:  [address] [place] [address] Email address:  [email or web address]      Emergency Contact(s)    Name Relationship Lgl Grd Work Phone Home Phone Mobile Phone   SRINATH ESCOBEDO Spouse  [phone]           Primary language:  English     needed? No   [place] Language Services:  [phone] op. 1  Other communication barriers:None    Preferred Method of Communication:  Vazquez  Current living arrangement: I live in a private home with spouse    Mobility Status/ Medical Equipment: Independent    Health Maintenance  Health Maintenance Reviewed: Due/Overdue   Health Maintenance Due   Topic Date Due     COPD ACTION PLAN  Never done     NICOTINE/TOBACCO CESSATION COUNSELING Q 1 YR  05/31/2014     ADVANCE CARE PLANNING  05/31/2018     ZOSTER IMMUNIZATION (2 of 2) 08/03/2021     MEDICARE ANNUAL WELLNESS VISIT  10/13/2021     DEXA  04/26/2022     FALL RISK ASSESSMENT  09/27/2022     PHQ-2 (once per calendar year)  01/01/2023     Pneumococcal Vaccine: 65+ Years (2 - PCV) 11/05/2022     My Access Plan  Medical Emergency 911   Primary Clinic Line [facility] [phone]   24 Hour Appointment Line [phone] or  [phone]) (toll-free)   24 Hour Nurse Line [phone] (toll-free)   Preferred Urgent Care [facility], [phone]     Preferred Hospital [facility]  [phone]     Preferred Pharmacy [id number] [place] - [address] AT [place] [address] & [facility]     Behavioral Health Crisis Line The National Suicide Prevention Lifeline at  "9-907-225-3649 or Text/Call 368     My Care Team Members  Patient Care Team       Relationship Specialty Notifications Start End    Leonardo Angela MD PCP - General Internal Medicine  7/17/10     Phone: [phone] Fax: [phone]         [address] [place] [address]    Leonardo Angela MD Assigned PCP   2/14/21     Phone: [phone] Fax: [phone]         [address] [place] [address]    Priya Wheat RN Lead Care Coordinator  Admissions 9/14/21     Shikha Riley MD MD Pulmonary Disease  9/29/21     Phone: [phone] Fax: [phone]         9 [facility] [address] [address]    Shikha Riley MD Assigned Pulmonology Provider   12/19/21     Phone: [phone] Fax: [phone]         [address] [place] [address] [address] [address]    Juan Schneider MD Assigned Musculoskeletal Provider   5/15/22     Phone: [phone] Fax: [phone]         [address] [place] [address] [address] [facility] [address]            My Care Plans  Self Management and Treatment Plan  Care Plan  Care Plan: Complete health maintenance and care gaps     Problem: Lifestyle choices     Goal: Medical     Start Date: 9/14/2021 Expected End Date: 6/22/2023    This Visit's Progress: 50% Recent Progress: 50%    Note:     Goal Statement: I will complete my recommended overdue health maintenance/care gaps.      Strengths: Strong advocate for self  Patient expressed understanding of goal: Yes  Action steps to achieve this goal:  1. I will try to quit smoking; on 9/10/2021 \"cold turkey\" x 2 weeks; Started back smoking 10/2021  2. I will follow up with my providers as scheduled/recommended (Follow up MRI/ABD TBD)  3. I will take my medications as prescribed  4.   I will discuss, review, schedule and complete recommended overdue health maintenance with my primary care provider  5.   I will contact my care team with questions, concerns, support needs  6.   I will use the clinic as a resource and I understand I " can contact my clinic with 24/7 after hours services available                         Action Plans on File:     Advance Care Plans/Directives Type:   -- (Full Code)            My Medical and Care Information  Problem List   Patient Active Problem List   Diagnosis     Tobacco use disorder     ACP (advance care planning)     COPD (chronic obstructive pulmonary disease) (H)     Hypothyroidism, unspecified type     Hyperlipidemia LDL goal <100     Pancreatic cyst     Positive OCTAVIANO (antinuclear antibody)      Current Medications and Allergies:    Allergies   Allergen Reactions     Augmented Betamethasone Diprop [Betamethasone] GI Disturbance     Augmentin       Vomiting/nausea     Codeine      Insomnia       Nitrofurantoin      Nausea, loss appetite     Shingrix [Zoster Vac Recomb Adjuvanted]      Severe body pain for 3-4 weeks     Current Outpatient Medications   Medication     levothyroxine (SYNTHROID/LEVOTHROID) 100 MCG tablet     umeclidinium-vilanterol (ANORO ELLIPTA) 62.5-25 MCG/INH oral inhaler     varenicline (CHANTIX RICHARD) 0.5 MG X 11 & 1 MG X 42 tablet     No current facility-administered medications for this visit.     Care Coordination Start Date: 9/14/2021   Frequency of Care Coordination: monthly     Form Last Updated: 03/22/2023

## 2023-03-26 ENCOUNTER — HEALTH MAINTENANCE LETTER (OUTPATIENT)
Age: 68
End: 2023-03-26

## 2023-03-27 DIAGNOSIS — J44.9 CHRONIC OBSTRUCTIVE PULMONARY DISEASE, UNSPECIFIED COPD TYPE (H): ICD-10-CM

## 2023-03-28 RX ORDER — UMECLIDINIUM BROMIDE AND VILANTEROL TRIFENATATE 62.5; 25 UG/1; UG/1
POWDER RESPIRATORY (INHALATION)
Qty: 60 EACH | Refills: 9 | Status: SHIPPED | OUTPATIENT
Start: 2023-03-28 | End: 2024-04-15

## 2023-03-31 ENCOUNTER — E-VISIT (OUTPATIENT)
Dept: INTERNAL MEDICINE | Facility: CLINIC | Age: 68
End: 2023-03-31
Payer: COMMERCIAL

## 2023-03-31 DIAGNOSIS — J06.9 UPPER RESPIRATORY TRACT INFECTION, UNSPECIFIED TYPE: Primary | ICD-10-CM

## 2023-04-12 ENCOUNTER — NURSE TRIAGE (OUTPATIENT)
Dept: NURSING | Facility: CLINIC | Age: 68
End: 2023-04-12

## 2023-04-12 ENCOUNTER — MYC MEDICAL ADVICE (OUTPATIENT)
Dept: INTERNAL MEDICINE | Facility: CLINIC | Age: 68
End: 2023-04-12

## 2023-04-12 NOTE — TELEPHONE ENCOUNTER
Triage nurse incorrectly scheduled pt for Friday 4/14  Instead of Thursday 4/13/23 but did tell pt to see me tomorrow/Thursday in separate [facility] message today. Spoke with central scheduling and had appts changed in schedule so that pt correctly scheduled to see me tomorrow

## 2023-04-12 NOTE — TELEPHONE ENCOUNTER
Pt scheduled per provider recommendation.       Appointments in Next Year    Apr 12, 2023  3:10 PM  SHORT with MITALI  PROVIDER  [facility] [place] ([facility] [place] ) [phone]   Apr 14, 2023  9:30 AM  (Arrive by 9:10 AM)  Provider Visit with Leonardo Angela MD  [facility] ([facility] ) [phone]   May 02, 2023  2:00 PM  (Arrive by 1:40 PM)  Annual Wellness Visit with Leonardo Angela MD  [facility] ([facility] ) [phone]        Gia Sharp, RN

## 2023-04-12 NOTE — TELEPHONE ENCOUNTER
Pt to see me tomorrow Thursday 4/13/23 at 10:00am in current chart review time slot. Schedule appt and inform pt. If worsens between now and then, be seen in UC or ER

## 2023-04-12 NOTE — TELEPHONE ENCOUNTER
Triage call  Patient calling she has been having fatigue and no get up and go for about 2 weeks she is a little short of breath and she has been coughing up greenish grey sputum.  She also doesn't have much of a appetite. She states she was told to get a pneumococcal vaccine but she does not wasn't to get it til she feels better. No fever .    Per protocol go to office now she really wants to see Dr Angela no appointments available till June.  Routing to PCP.    Isabella Pérez RN   [facility] Nurse Advisor  12:58 PM 4/12/2023        Reason for Disposition    MODERATE weakness (i.e., interferes with work, school, normal activities) and cause unknown  (Exceptions: Weakness with acute minor illness, or weakness from poor fluid intake.)    Additional Information    Negative: SEVERE difficulty breathing (e.g., struggling for each breath, speaks in single words)    Negative: Shock suspected (e.g., cold/pale/clammy skin, too weak to stand, low BP, rapid pulse)    Negative: Difficult to awaken or acting confused (e.g., disoriented, slurred speech)    Negative: Fainted > 15 minutes ago and still feels too weak or dizzy to stand    Negative: SEVERE weakness (i.e., unable to walk or barely able to walk, requires support) and new-onset or worsening    Negative: Sounds like a life-threatening emergency to the triager    Negative: Weakness of the face, arm or leg on one side of the body    Negative: Has diabetes mellitus and weakness from low blood sugar (i.e., < 60 mg/dL or 3.5 mmol/L)    Negative: Recent heat exposure, suspected cause of weakness    Negative: Vomiting is main symptom    Negative: Diarrhea is main symptom    Negative: Difficulty breathing    Negative: Heart beating < 50 beats per minute OR > 140 beats per minute    Negative: Extra heartbeats OR irregular heart beating (i.e., 'palpitations')    Negative: Follows bleeding (e.g., from vomiting, rectum, vagina)  (Exception: Small transient weakness from  sight of a small amount blood.)    Negative: Bloody, black, or tarry bowel movements  (Exception: Chronic-unchanged black-grey bowel movements and is taking iron pills or Pepto-Bismol.)    Negative: MODERATE weakness from poor fluid intake with no improvement after 2 hours of rest and fluids    Negative: Drinking very little and dehydration suspected (e.g., no urine > 12 hours, very dry mouth, very lightheaded)    Negative: Patient sounds very sick or weak to the triager    Protocols used: WEAKNESS (GENERALIZED) AND FATIGUE-A-OH

## 2023-04-12 NOTE — TELEPHONE ENCOUNTER
ED / Discharge Outreach Protocol    Patient Contact    Attempt # 1    Was call answered?  No.  Left message on voicemail with information to call me back.    Sent [email or web address] message as well.     On call back please assist pt in scheduling.     Gia Sharp RN

## 2023-04-13 ENCOUNTER — ANCILLARY PROCEDURE (OUTPATIENT)
Dept: GENERAL RADIOLOGY | Facility: CLINIC | Age: 68
End: 2023-04-13
Attending: INTERNAL MEDICINE
Payer: COMMERCIAL

## 2023-04-13 ENCOUNTER — OFFICE VISIT (OUTPATIENT)
Dept: INTERNAL MEDICINE | Facility: CLINIC | Age: 68
End: 2023-04-13
Payer: COMMERCIAL

## 2023-04-13 VITALS
DIASTOLIC BLOOD PRESSURE: 68 MMHG | HEIGHT: 68 IN | TEMPERATURE: 98.1 F | SYSTOLIC BLOOD PRESSURE: 126 MMHG | WEIGHT: 160.1 LBS | OXYGEN SATURATION: 96 % | BODY MASS INDEX: 24.26 KG/M2 | HEART RATE: 96 BPM

## 2023-04-13 DIAGNOSIS — R53.83 OTHER FATIGUE: ICD-10-CM

## 2023-04-13 DIAGNOSIS — H20.9 UVEITIS: ICD-10-CM

## 2023-04-13 DIAGNOSIS — F17.200 TOBACCO USE DISORDER: ICD-10-CM

## 2023-04-13 DIAGNOSIS — K86.2 PANCREATIC CYST: ICD-10-CM

## 2023-04-13 DIAGNOSIS — R76.8 POSITIVE ANA (ANTINUCLEAR ANTIBODY): ICD-10-CM

## 2023-04-13 DIAGNOSIS — J44.9 CHRONIC OBSTRUCTIVE PULMONARY DISEASE, UNSPECIFIED COPD TYPE (H): ICD-10-CM

## 2023-04-13 DIAGNOSIS — J06.9 UPPER RESPIRATORY TRACT INFECTION, UNSPECIFIED TYPE: ICD-10-CM

## 2023-04-13 DIAGNOSIS — E03.9 HYPOTHYROIDISM, UNSPECIFIED TYPE: ICD-10-CM

## 2023-04-13 LAB
ALBUMIN SERPL BCG-MCNC: 4.1 G/DL (ref 3.5–5.2)
ALP SERPL-CCNC: 91 U/L (ref 35–104)
ALT SERPL W P-5'-P-CCNC: 20 U/L (ref 10–35)
ANION GAP SERPL CALCULATED.3IONS-SCNC: 13 MMOL/L (ref 7–15)
AST SERPL W P-5'-P-CCNC: 33 U/L (ref 10–35)
BILIRUB SERPL-MCNC: 0.4 MG/DL
BUN SERPL-MCNC: 9.7 MG/DL (ref 8–23)
CALCIUM SERPL-MCNC: 10.1 MG/DL (ref 8.8–10.2)
CHLORIDE SERPL-SCNC: 104 MMOL/L (ref 98–107)
CORTIS SERPL-MCNC: 9.2 UG/DL
CREAT SERPL-MCNC: 0.69 MG/DL (ref 0.51–0.95)
DEPRECATED HCO3 PLAS-SCNC: 22 MMOL/L (ref 22–29)
ERYTHROCYTE [DISTWIDTH] IN BLOOD BY AUTOMATED COUNT: 13.8 % (ref 10–15)
GFR SERPL CREATININE-BSD FRML MDRD: >90 ML/MIN/1.73M2
GLUCOSE SERPL-MCNC: 103 MG/DL (ref 70–99)
HCT VFR BLD AUTO: 47.3 % (ref 35–47)
HGB BLD-MCNC: 15.7 G/DL (ref 11.7–15.7)
MCH RBC QN AUTO: 30.5 PG (ref 26.5–33)
MCHC RBC AUTO-ENTMCNC: 33.2 G/DL (ref 31.5–36.5)
MCV RBC AUTO: 92 FL (ref 78–100)
PLATELET # BLD AUTO: 325 10E3/UL (ref 150–450)
POTASSIUM SERPL-SCNC: 4.3 MMOL/L (ref 3.4–5.3)
PROT SERPL-MCNC: 7.4 G/DL (ref 6.4–8.3)
RBC # BLD AUTO: 5.14 10E6/UL (ref 3.8–5.2)
SODIUM SERPL-SCNC: 139 MMOL/L (ref 136–145)
TSH SERPL DL<=0.005 MIU/L-ACNC: 3.68 UIU/ML (ref 0.3–4.2)
WBC # BLD AUTO: 11.1 10E3/UL (ref 4–11)

## 2023-04-13 PROCEDURE — 80053 COMPREHEN METABOLIC PANEL: CPT | Performed by: INTERNAL MEDICINE

## 2023-04-13 PROCEDURE — 71046 X-RAY EXAM CHEST 2 VIEWS: CPT | Mod: TC | Performed by: RADIOLOGY

## 2023-04-13 PROCEDURE — 85027 COMPLETE CBC AUTOMATED: CPT | Performed by: INTERNAL MEDICINE

## 2023-04-13 PROCEDURE — 36415 COLL VENOUS BLD VENIPUNCTURE: CPT | Performed by: INTERNAL MEDICINE

## 2023-04-13 PROCEDURE — 82533 TOTAL CORTISOL: CPT | Performed by: INTERNAL MEDICINE

## 2023-04-13 PROCEDURE — 84443 ASSAY THYROID STIM HORMONE: CPT | Performed by: INTERNAL MEDICINE

## 2023-04-13 PROCEDURE — 99214 OFFICE O/P EST MOD 30 MIN: CPT | Performed by: INTERNAL MEDICINE

## 2023-04-13 RX ORDER — ALBUTEROL SULFATE 90 UG/1
2 AEROSOL, METERED RESPIRATORY (INHALATION) EVERY 6 HOURS PRN
Qty: 18 G | Refills: 11 | Status: SHIPPED | OUTPATIENT
Start: 2023-04-13

## 2023-04-13 RX ORDER — CEFUROXIME AXETIL 500 MG/1
500 TABLET ORAL 2 TIMES DAILY
Qty: 14 TABLET | Refills: 0 | Status: SHIPPED | OUTPATIENT
Start: 2023-04-13 | End: 2023-04-20

## 2023-04-13 RX ORDER — AZITHROMYCIN 250 MG/1
TABLET, FILM COATED ORAL
Qty: 6 TABLET | Refills: 0 | Status: SHIPPED | OUTPATIENT
Start: 2023-04-13 | End: 2023-04-18

## 2023-04-13 ASSESSMENT — PAIN SCALES - GENERAL: PAINLEVEL: MILD PAIN (3)

## 2023-04-13 NOTE — PROGRESS NOTES
ASSESSMENT:   1. Upper respiratory tract infection, unspecified type  Pulmonary symptoms not typical for COVID.  With smoking history and symptoms, will check chest x-ray and cover more broadly for bacterial coverage, especially with recent use of steroids.  Albuterol as needed  - albuterol (PROAIR HFA/PROVENTIL HFA/VENTOLIN HFA) 108 (90 Base) MCG/ACT inhaler; Inhale 2 puffs into the lungs every 6 hours as needed for shortness of breath, wheezing or cough  Dispense: 18 g; Refill: 11  - XR Chest 2 Views; Future  - azithromycin (ZITHROMAX) 250 MG tablet; Take 2 tablets (500 mg) by mouth daily for 1 day, THEN 1 tablet (250 mg) daily for 4 days.  Dispense: 6 tablet; Refill: 0  - cefuroxime (CEFTIN) 500 MG tablet; Take 1 tablet (500 mg) by mouth 2 times daily for 7 days  Dispense: 14 tablet; Refill: 0    2. Chronic obstructive pulmonary disease, unspecified COPD type (H)  Using Anoro inhaler daily and generally controlled though sx worsened with URI.  Continue inhaler therapy.  Albuterol as needed.  Smoking cessation counseling as below  - albuterol (PROAIR HFA/PROVENTIL HFA/VENTOLIN HFA) 108 (90 Base) MCG/ACT inhaler; Inhale 2 puffs into the lungs every 6 hours as needed for shortness of breath, wheezing or cough  Dispense: 18 g; Refill: 11    3. Tobacco use disorder  Counseled regarding smoking cessation.  Patient will try to do this.  Albuterol as needed  - albuterol (PROAIR HFA/PROVENTIL HFA/VENTOLIN HFA) 108 (90 Base) MCG/ACT inhaler; Inhale 2 puffs into the lungs every 6 hours as needed for shortness of breath, wheezing or cough  Dispense: 18 g; Refill: 11    4. Hypothyroidism, unspecified type  Previous mild TSH elevation.  With fatigue, recheck thyroid function.  Currently on levothyroxine 100 mcg daily.  Has had previous side effects with higher dosage  - TSH with free T4 reflex; Future  - TSH with free T4 reflex    5. Other fatigue  Concern for adrenal insufficiency with recent prednisone use versus other  metabolic cause versus general effects of recent illness.  Labs as ordered.  URI treatment as above  - CBC with platelets; Future  - TSH with free T4 reflex; Future  - Comprehensive metabolic panel; Future  - Cortisol; Future  - CBC with platelets  - TSH with free T4 reflex  - Comprehensive metabolic panel  - Cortisol    6. Pancreatic cyst  Radiology recommending repeat imaging 1 year.  May benefit from opinion of GI however.  Patient has follow-up with me in a couple weeks.  We will further discuss possible referral to Dr Lim at [facility] for opinion re:  EUS and biopsy vs continued ob servation    7. Uveitis  Status post significant steroid treatment by ophthalmology.  Improved.  History positive OCTAVIANO antibody without signs of biliary cirrhosis or hepatitis however will discuss rheumatology referral also at follow-up appointment in a couple weeks      PLAN:   Chest x-ray today  Labs as ordered  Cefuroxime 500 mg tablet, 1 tablet twice a day for 7 days plus azithromycin 2 tablets today, then 1 tablet daily for 4 days for antibiotic coverage  Continue current medications  Try to avoid cigarette use as able  Keep appointment with me in early May.  Call  [phone] or use 1-800-DOCTORS to schedule a future lab appointment   fasting a few days before the appt with me so can review results with you at  that time. For fasting labs, please refrain from eating for 8 hours or more.   Drink 2 glasses of water before your lab appointment. It is fine to take your  oral medications on the morning of the lab test as usual   Will address referral to [place]  [facility]  re: pancreas, orders for CT chest imaging due in mid June  and whether rheumatology referral needed when see you back  in May       (Chart documentation was completed, in part, with [facility] voice-recognition software. Even though reviewed, some grammatical, spelling, and word errors may remain.)    Leonardo Angela MD  Internal Medicine Department  [facility]  JASON Norris is a 67 year old, presenting for the following health issues:  Fatigue and Breathing Problem (SOB)    History of Present Illness       Reason for visit:  Fatigue shortness of breath  Symptom onset:  3-7 days ago  Symptom intensity:  Moderate  Symptom progression:  Staying the same  Had these symptoms before:  No  What makes it better:  Taking my inhaler at night    She eats 2-3 servings of fruits and vegetables daily.She consumes 0 sweetened beverage(s) daily.She exercises with enough effort to increase her heart rate 10 to 19 minutes per day.  She exercises with enough effort to increase her heart rate 3 or less days per week.   She is taking medications regularly.        Most recent lab results reviewed with pt.      Has been working with retinal specialist.  Diagnosed with uveitis.  See notes in chart.  Restarted in January on prednisone 60 mg daily with dose being reduced by 10 mg every 2 weeks.  Recently finished prednisone a few weeks ago and then had a steroid injection into her right eye.  Has follow-up with ophthalmology 5/31/2023.  Previous negative work-up for sarcoid, TB, syphilis.  Ophthalmology is suggested patient possibly see rheumatology in the future.  History  antimitochondrial antibody elevation and previous mild CRP elevation with normal sed rate and negative OCTAVIANO.  MRI of the abdomen has shown some pancreatic cyst minimally increased in size with last scan but no liver disease.  Cholelithiasis without cholecystitis noted.  Previous LFTs have been normal.  Regarding recent URI, patient currently smoking 3 cigarettes/day.  Grayish-greenish sputum recently.  Previous mild fever but none today.  Minimal shortness of breath.  O2 sats okay.  Appetite okay       Additional ROS:   Constitutional, HEENT, Cardiovascular, Pulmonary, GI and , Neuro, MSK and Psych review of systems/symptoms are otherwise negative or unchanged from previous, except as noted above.   "    OBJECTIVE:  /68   Pulse 96   Temp 98.1  F (36.7  C) (Oral)   Ht 1.727 m (5' 8\")   Wt 72.6 kg (160 lb 1.6 oz)   LMP  (LMP Unknown)   SpO2 96%   Breastfeeding No   BMI 24.34 kg/m     Estimated body mass index is 24.34 kg/m  as calculated from the following:    Height as of this encounter: 1.727 m (5' 8\").    Weight as of this encounter: 72.6 kg (160 lb 1.6 oz).  Eye: PERRL, EOMI..  Bilateral conjunctival erythema without discharge  HENT: ear canals and TM's normal and nose and mouth without ulcers or lesions   Neck: no adenopathy. Thyroid normal to palpation. No bruits  Pulm: Lungs clear to auscultation posterior.  Mild anterior midline rhonchi which clears with cough  CV: Regular rates and rhythm  GI: Soft, nontender, Normal active bowel sounds, No hepatosplenomegaly or masses palpable  Ext: Peripheral pulses intact. No edema.                 "

## 2023-04-13 NOTE — PATIENT INSTRUCTIONS
Chest x-ray today  Labs as ordered  Cefuroxime 500 mg tablet, 1 tablet twice a day for 7 days plus azithromycin 2 tablets today, then 1 tablet daily for 4 days for antibiotic coverage  Continue current medications  Try to avoid cigarette use as able  Keep appointment with me in early May.  Call  [phone] or use [employer] to schedule a future lab appointment   fasting a few days before the appt with me so can review results with you at  that time. For fasting labs, please refrain from eating for 8 hours or more.   Drink 2 glasses of water before your lab appointment. It is fine to take your  oral medications on the morning of the lab test as usual   Will address referral to [place]  [facility]  re: pancreas and whether rheumatology referral needed when see you in May

## 2023-04-20 ENCOUNTER — PATIENT OUTREACH (OUTPATIENT)
Dept: NURSING | Facility: CLINIC | Age: 68
End: 2023-04-20
Payer: COMMERCIAL

## 2023-04-20 ASSESSMENT — ACTIVITIES OF DAILY LIVING (ADL): DEPENDENT_IADLS:: INDEPENDENT

## 2023-04-20 NOTE — PROGRESS NOTES
"Clinic Care Coordination Contact    Follow Up Progress Note      Assessment:   Patient states she has completed both antibiotics \"yesterday\" and is feeling much better and breathing easier; patient denies shortness of breath.  Patient is aware of her upcoming appointment on 5/2/2023 with Primary Care Provider and is hoping to have lab work as well. Patient reports the appointment is for the Medicare Annual Wellbeing visit.    Patient states she is trying to quit and is doing better with smoking less and less yet \"I'm still smoking\".  Patient reports she has applied for a part time  position and is waiting to hear back.  Patient states her  is urging her to get ou of the house and feels this is why she has been on and off ill the past few months.  Patient states her  is \"always on my case to quit smoking\" so she knows she needs to do something.    Patient requests ACP/HCD to be sent to her home via mail which RN[facility] did.  Patient has no concerns or questions at this time.    Care Gaps:    Health Maintenance Due   Topic Date Due     COPD ACTION PLAN  Never done     NICOTINE/TOBACCO CESSATION COUNSELING Q 1 YR  05/31/2014     ADVANCE CARE PLANNING  05/31/2018     ZOSTER IMMUNIZATION (2 of 2) 08/03/2021     MEDICARE ANNUAL WELLNESS VISIT  10/13/2021     DEXA  04/26/2022     Pneumococcal Vaccine: 65+ Years (2 - PCV) 11/05/2022     Care Gaps Last addressed on 4/20/2023    Care Plans  Care Plan: Complete health maintenance and care gaps     Problem: Lifestyle choices     Goal: Medical     Start Date: 9/14/2021 Expected End Date: 6/22/2023    This Visit's Progress: 60% Recent Progress: 50%    Note:     Goal Statement: I will complete my recommended overdue health maintenance/care gaps.      Strengths: Strong advocate for self  Patient expressed understanding of goal: Yes  Action steps to achieve this goal:  1. I will try to quit smoking; on 9/10/2021 \"cold turkey\" x 2 weeks; Started back smoking " 10/2021  2. I will follow up with my providers as scheduled/recommended (Follow up MRI/ABD TBD)  3. I will take my medications as prescribed  4.   I will discuss, review, schedule and complete recommended overdue health maintenance with my primary care provider  5.   I will contact my care team with questions, concerns, support needs  6.   I will use the clinic as a resource and I understand I can contact my clinic with 24/7 after hours services available                      Intervention/Education provided during outreach:   RN[facility] called and spoke with patient/caregiver; introduced self, discussed role of Care Coordination and explained reason for call    Plan:   -Patient will contact the care team with questions, concerns, support needs   -Patient will use the clinic as a resource and understands (s)he can contact the [facility] with 24/7 after hours services available  -Care Coordinator will remain available as needed  -RNCC will follow up in one month for follow up appointments/recommendations and goal progression     Priya Wheat RN, BSN, PHN  Primary Care / Care Coordinator   [facility]  E-mail [email or web address]   [phone]

## 2023-04-28 ENCOUNTER — LAB (OUTPATIENT)
Dept: LAB | Facility: CLINIC | Age: 68
End: 2023-04-28
Payer: COMMERCIAL

## 2023-04-28 DIAGNOSIS — Z13.6 CARDIOVASCULAR SCREENING; LDL GOAL LESS THAN 100: Primary | ICD-10-CM

## 2023-04-28 DIAGNOSIS — Z13.6 CARDIOVASCULAR SCREENING; LDL GOAL LESS THAN 100: ICD-10-CM

## 2023-04-28 LAB
CHOLEST SERPL-MCNC: 196 MG/DL
HDLC SERPL-MCNC: 36 MG/DL
LDLC SERPL CALC-MCNC: 130 MG/DL
NONHDLC SERPL-MCNC: 160 MG/DL
TRIGL SERPL-MCNC: 152 MG/DL

## 2023-04-28 PROCEDURE — 36415 COLL VENOUS BLD VENIPUNCTURE: CPT

## 2023-04-28 PROCEDURE — 80061 LIPID PANEL: CPT

## 2023-05-02 ENCOUNTER — OFFICE VISIT (OUTPATIENT)
Dept: INTERNAL MEDICINE | Facility: CLINIC | Age: 68
End: 2023-05-02
Payer: COMMERCIAL

## 2023-05-02 VITALS
SYSTOLIC BLOOD PRESSURE: 144 MMHG | DIASTOLIC BLOOD PRESSURE: 81 MMHG | HEIGHT: 68 IN | OXYGEN SATURATION: 97 % | BODY MASS INDEX: 24.16 KG/M2 | TEMPERATURE: 97.6 F | WEIGHT: 159.4 LBS | HEART RATE: 98 BPM

## 2023-05-02 DIAGNOSIS — R03.0 ELEVATED BP WITHOUT DIAGNOSIS OF HYPERTENSION: ICD-10-CM

## 2023-05-02 DIAGNOSIS — Z00.00 MEDICARE ANNUAL WELLNESS VISIT, INITIAL: Primary | ICD-10-CM

## 2023-05-02 DIAGNOSIS — R91.8 PULMONARY NODULES: ICD-10-CM

## 2023-05-02 DIAGNOSIS — K86.2 PANCREATIC CYST: ICD-10-CM

## 2023-05-02 DIAGNOSIS — Z23 NEED FOR PROPHYLACTIC VACCINATION AGAINST STREPTOCOCCUS PNEUMONIAE (PNEUMOCOCCUS): ICD-10-CM

## 2023-05-02 DIAGNOSIS — R76.8 ANTIMITOCHONDRIAL ANTIBODY POSITIVE: ICD-10-CM

## 2023-05-02 DIAGNOSIS — J44.9 CHRONIC OBSTRUCTIVE PULMONARY DISEASE, UNSPECIFIED COPD TYPE (H): ICD-10-CM

## 2023-05-02 DIAGNOSIS — E78.5 HYPERLIPIDEMIA LDL GOAL <100: ICD-10-CM

## 2023-05-02 DIAGNOSIS — F17.200 TOBACCO USE DISORDER: ICD-10-CM

## 2023-05-02 PROCEDURE — 99214 OFFICE O/P EST MOD 30 MIN: CPT | Mod: 25 | Performed by: INTERNAL MEDICINE

## 2023-05-02 PROCEDURE — G0402 INITIAL PREVENTIVE EXAM: HCPCS | Performed by: INTERNAL MEDICINE

## 2023-05-02 PROCEDURE — 90677 PCV20 VACCINE IM: CPT | Performed by: INTERNAL MEDICINE

## 2023-05-02 PROCEDURE — G0009 ADMIN PNEUMOCOCCAL VACCINE: HCPCS | Performed by: INTERNAL MEDICINE

## 2023-05-02 RX ORDER — ATORVASTATIN CALCIUM 20 MG/1
20 TABLET, FILM COATED ORAL DAILY
Qty: 30 TABLET | Refills: 11 | Status: SHIPPED | OUTPATIENT
Start: 2023-05-02 | End: 2023-07-25 | Stop reason: SINTOL

## 2023-05-02 ASSESSMENT — ENCOUNTER SYMPTOMS
ARTHRALGIAS: 0
DYSURIA: 0
SORE THROAT: 0
BREAST MASS: 0
JOINT SWELLING: 0
HEMATOCHEZIA: 0
FEVER: 0
NERVOUS/ANXIOUS: 0
PALPITATIONS: 0
NAUSEA: 0
SHORTNESS OF BREATH: 1
HEMATURIA: 0
ABDOMINAL PAIN: 0
DIZZINESS: 0
COUGH: 0
WEAKNESS: 0
HEADACHES: 0
PARESTHESIAS: 0
EYE PAIN: 0
CHILLS: 0
FREQUENCY: 0
HEARTBURN: 0
CONSTIPATION: 0
MYALGIAS: 0
DIARRHEA: 0

## 2023-05-02 ASSESSMENT — ACTIVITIES OF DAILY LIVING (ADL): CURRENT_FUNCTION: NO ASSISTANCE NEEDED

## 2023-05-02 NOTE — PROGRESS NOTES
"SUBJECTIVE:   Myrna is a 67 year old who presents for Preventive Visit and follow-up regarding multiple medical issues as below.     Patient has been advised of split billing requirements and indicates understanding: Yes     Are you in the first 12 months of your Medicare coverage?  Yes,  Visual Acuity:  Right Eye: 20/40    Left Eye: 20/30  Both Eyes: 20/40    Healthy Habits:     In general, how would you rate your overall health?  Good    Frequency of exercise:  2-3 days/week    Duration of exercise:  45-60 minutes    Do you usually eat at least 4 servings of fruit and vegetables a day, include whole grains    & fiber and avoid regularly eating high fat or \"junk\" foods?  Yes    Taking medications regularly:  Yes    Ability to successfully perform activities of daily living:  No assistance needed    Home Safety:  No safety concerns identified    Hearing Impairment:  Difficulty following a conversation in a noisy restaurant or crowded room and difficulty understanding soft or whispered speech    In the past 6 months, have you been bothered by leaking of urine?  No    In general, how would you rate your overall mental or emotional health?  Good      PHQ-2 Total Score: 0    Additional concerns today:  Yes         Have you ever done Advance Care Planning? (For example, a Health Directive, POLST, or a discussion with a medical provider or your loved ones about your wishes): No, advance care planning information given to patient to review.  Patient plans to discuss their wishes with loved ones or provider.         Fall risk  Fallen 2 or more times in the past year?: No  Any fall with injury in the past year?: No    Cognitive Screening   1) Repeat 3 items (Leader, Season, Table)    2) Clock draw: NORMAL  3) 3 item recall: Recalls 3 objects  Results: 3 items recalled: COGNITIVE IMPAIRMENT LESS LIKELY    Mini-CogTM Copyright S Mckinley. Licensed by the author for use in [facility]; reprinted with permission " ([email or web address]). All rights reserved.      Do you have sleep apnea, excessive snoring or daytime drowsiness?: no    Reviewed and updated as needed this visit by clinical staff                  Reviewed and updated as needed this visit by Provider                 Social History     Tobacco Use     Smoking status: Some Days     Packs/day: 0.50     Years: 20.00     Pack years: 10.00     Types: Cigarettes     Last attempt to quit: 9/10/2021     Years since quittin.6     Smokeless tobacco: Never     Tobacco comments:      3 cigs daily   Vaping Use     Vaping status: Never Used   Substance Use Topics     Alcohol use: No            2023     7:34 AM   Alcohol Use   Prescreen: >3 drinks/day or >7 drinks/week? No     Do you have a current opioid prescription? No  Do you use any other controlled substances or medications that are not prescribed by a provider? None              Current providers sharing in care for this patient include:   Patient Care Team:  Leonardo Angela MD as PCP - General (Internal Medicine)  Leonardo Angela MD as Assigned PCP  Priya Wheat RN as Lead Care Coordinator  Shikha Riley MD as MD (Pulmonary Disease)  Shikha Riley MD as Assigned Pulmonology Provider  Juan Schneider MD as Assigned Musculoskeletal Provider    The following health maintenance items are reviewed in Epic and correct as of today:  Health Maintenance   Topic Date Due     COPD ACTION PLAN  Never done     NICOTINE/TOBACCO CESSATION COUNSELING Q 1 YR  2014     ADVANCE CARE PLANNING  2018     ZOSTER IMMUNIZATION (2 of 2) 2021     MEDICARE ANNUAL WELLNESS VISIT  10/13/2021     DEXA  2022     ANNUAL REVIEW OF HM ORDERS  2022     Pneumococcal Vaccine: 65+ Years (2 - PCV) 2022     MAMMO SCREENING  2024     LUNG CANCER SCREENING  2024     TSH W/FREE T4 REFLEX  2024     FALL RISK ASSESSMENT  2024     COLORECTAL CANCER SCREENING  2026     DTAP/TDAP/TD  IMMUNIZATION (2 - Td or Tdap) 03/08/2028     LIPID  04/28/2028     SPIROMETRY  Completed     HEPATITIS C SCREENING  Completed     PHQ-2 (once per calendar year)  Completed     INFLUENZA VACCINE  Completed     COVID-19 Vaccine  Completed     IPV IMMUNIZATION  Aged Out     MENINGITIS IMMUNIZATION  Aged Out     PAP  Discontinued     Labs reviewed in EPIC      FHS-7:       12/10/2021    11:01 AM 12/10/2021    11:04 AM 5/2/2023     7:38 AM   Breast CA Risk Assessment (FHS-7)   Did any of your first-degree relatives have breast or ovarian cancer? No Yes Yes   Did any of your relatives have bilateral breast cancer? No Unknown Unknown   Did any man in your family have breast cancer? No No No   Did any woman in your family have breast and ovarian cancer? No No Yes   Did any woman in your family have breast cancer before age 50 y? No Yes Yes   Do you have 2 or more relatives with breast and/or ovarian cancer? No No No   Do you have 2 or more relatives with breast and/or bowel cancer? No No No       Mammogram Screening: Recommended mammography every 1-2 years with patient discussion and risk factor consideration  Pertinent mammograms are reviewed under the imaging tab.    Review of Systems   Constitutional: Negative for chills and fever.   HENT: Positive for hearing loss. Negative for congestion, ear pain and sore throat.    Eyes: Positive for visual disturbance. Negative for pain.   Respiratory: Positive for shortness of breath. Negative for cough.    Cardiovascular: Negative for chest pain, palpitations and peripheral edema.   Gastrointestinal: Negative for abdominal pain, constipation, diarrhea, heartburn, hematochezia and nausea.   Breasts:  Negative for tenderness, breast mass and discharge.   Genitourinary: Negative for dysuria, frequency, genital sores, hematuria, pelvic pain, urgency, vaginal bleeding and vaginal discharge.   Musculoskeletal: Negative for arthralgias, joint swelling and myalgias.   Skin: Negative for  rash.   Neurological: Negative for dizziness, weakness, headaches and paresthesias.   Psychiatric/Behavioral: Negative for mood changes. The patient is not nervous/anxious.            Eye  exam scheduled 5/31/23   Deaf right ear. Reduced left. Has hearting aids at home but not wearing today  Continues to smoke 3/4 pack of cigarettes per day.  Not interested in quitting.  Mild chronic shortness of breath.  Nodular opacity seen left lower lobe lung on most recent CT chest below.  Needs follow-up 3 months later in mid June.  No cough, fevers, chills  History hyperintense foci in tail of pancreas.  Slight increase in size per MRI follow-up.  Radiology recommending follow-up 1 year but would like to have GI give recommendation in addition.  Denies current abdominal pain.  Weight up 7 pounds compared to 1 year ago    CT CHEST WITH CONTRAST  3/16/2023 1:54 PM     HISTORY: Follow-up adenopathy seen axilla bilaterally on CT chest  8/9/2022. Axillary adenopathy.     TECHNIQUE: Scans obtained from the apices through the diaphragm with  IV contrast. 76mL Isovue-370 IV injected. Radiation dose for this scan  was reduced using automated exposure control, adjustment of the mA  and/or kV according to patient size, or iterative reconstruction  technique. 2D and 3D MIP reconstructions were performed by the CT  technologist     COMPARISON:  CT abdomen and pelvis on 8/9/2022     FINDINGS:  Chest/mediastinum: Heterogenous thyroid gland. No cardiomegaly. Trace  pericardial effusion. Moderate atherosclerotic vascular calcification  of the coronary arteries. No significant mediastinal or hilar  lymphadenopathy. Interval decrease in size of the previously noted  enlarged axillary lymph nodes on 8/9/2022 exam. Small hiatal hernia.     Lungs and pleura: No pleural effusion or pneumothorax. Upper lobe  predominant emphysematous changes. Multiple nodular pulmonary  opacities in the posterior aspect of the left lower lobe measuring up  to 1.4  cm, indeterminate, could be infectious. Multiple other  scattered pulmonary nodules including, for example, 5 mm left lower  lobe nodule (series 6 image 142), significantly changed as compared to  8/9/2022 exam.     Upper abdomen: Limited evaluation of the upper abdomen due to lack of  coverage. Please refer to concurrently performed abdominal MRI for  findings related to upper abdomen.     Bones and soft tissue: Multilevel degenerative changes of the spine.  No suspicious osseous lesion.                                                                      IMPRESSION:   1. Interval decrease in the size of the previously noted enlarged  axillary lymph nodes as compared to 8/9/2022 exam, likely represent  improving reactive lymph nodes.  2. Multiple new nodular pulmonary opacities in the posterior aspect of  the left lower lobe measuring up to 1.4 cm, indeterminate, could be  infectious, recommend follow-up exam with dedicated chest CT in three  months to ensure resolution.  3. Small hiatal hernia  4. Heterogenous atrophic appearance of the thyroid gland.  5. Mild upper lobe predominant emphysematous changes.     ELIA GALVAN MD       MR ABDOMEN MRCP WITHOUT AND WITH CONTRAST   3/16/2023 1:59 PM      HISTORY: Follow-up cystic pancreatic lesions seen on MRCP May 2022.  Pancreatic lesion.     TECHNIQUE: Multiplanar, multiecho MRI was performed using T1, T2, and  in- and out-of-phase imaging. Pre- and postcontrast T1 images were  acquired after the administration of 7mL Gadavist IV. MRCP images and  coronal 3-D thin section MRCP images, were acquired on the scanner  through the biliary tree. 3-D image reformatting was performed on the  acquisition scanner.     COMPARISON: Abdominal MRI on 5/6/2022.     FINDINGS: The exam is mildly limited by motion/respiratory artifacts,  within this limitation, there is:     Hepatobiliary: Small T2 hyperintense area along the posterior aspect  of the right hepatic lobe (series 9  image 11), not significantly  changed as compared to 5/6/2022 exam, of indeterminate etiology, could  represent small liver cyst. Otherwise no suspicious focal hepatic  lesion. Cholelithiasis without MRI evidence of acute cholecystitis.     Pancreas: No main pancreatic ductal dilatation. Multiple T2  hyperintense foci in the pancreas measuring 1.4 cm in the pancreatic  tail, slightly increased in size as compared to 5/6/2022 exam where it  measured 1.2 cm. Multiple other subcentimeter T2 hyperintense foci are  also noted most prominent at the pancreatic tail, grossly unchanged as  compared to 5/6/2022 exam.     Spleen: No splenomegaly.     Adrenal glands: No adrenal nodules.     Kidneys: No hydronephrosis or solid renal mass.     Remainder of the abdomen: Multiple prominent upper abdominal lymph  nodes, indeterminate, could be reactive. No abnormally dilated bowel  loops. Partially visualized distended urinary bladder. No significant  free fluid in the visualized abdomen.      Bones and soft tissue: No suspicious osseous lesion.                                                                      IMPRESSION:  1. Multiple T2 hyperintense foci throughout the pancreas, including  1.4 cm focus in the pancreatic tail which demonstrates slight interval  increase in the size as compared to 5/6/2022 exam when it measured 1.2  cm, other smaller subcentimeter foci appear similar to prior.  Recommend follow-up exam with contrast enhanced MRI/MRCP in 1 year to  confirm long-term stability.  2. Cholelithiasis without MRI evidence of acute cholecystitis.       ELIA GALVAN MD       Component      Latest Ref Rng 1/9/2023  1:57 PM 4/13/2023  10:36 AM   Sodium      136 - 145 mmol/L  139    Potassium      3.4 - 5.3 mmol/L  4.3    Chloride      98 - 107 mmol/L  104    Carbon Dioxide (CO2)      22 - 29 mmol/L  22    Anion Gap      7 - 15 mmol/L  13    Urea Nitrogen      8.0 - 23.0 mg/dL  9.7    Creatinine      0.51 - 0.95 mg/dL   0.69    Calcium      8.8 - 10.2 mg/dL  10.1    Glucose      70 - 99 mg/dL  103 (H)    Alkaline Phosphatase      35 - 104 U/L  91    AST      10 - 35 U/L  33    ALT      10 - 35 U/L  20    Protein Total      6.4 - 8.3 g/dL  7.4    Albumin      3.5 - 5.2 g/dL  4.1    Bilirubin Total      <=1.2 mg/dL  0.4    GFR Estimate      >60 mL/min/1.73m2  >90    WBC      4.0 - 11.0 10e3/uL  11.1 (H)    RBC Count      3.80 - 5.20 10e6/uL  5.14    Hemoglobin      11.7 - 15.7 g/dL  15.7    Hematocrit      35.0 - 47.0 %  47.3 (H)    MCV      78 - 100 fL  92    MCH      26.5 - 33.0 pg  30.5    MCHC      31.5 - 36.5 g/dL  33.2    RDW      10.0 - 15.0 %  13.8    Platelet Count      150 - 450 10e3/uL  325    Quantiferon-TB Gold Plus Result      Negative  Negative     TB1 Ag minus Nil Value      IU/mL -0.01     TB2 Ag minus Nil Value      IU/mL 0.00     Mitogen minus Nil Result      IU/mL 2.49     Nil Result      IU/mL 0.04     Cholesterol      <200 mg/dL     Triglycerides      <150 mg/dL     HDL Cholesterol      >=50 mg/dL     LDL Cholesterol Calculated      <=100 mg/dL     Non HDL Cholesterol      <130 mg/dL     Angiotensin Converting Enzyme      16 - 85 U/L 37     Treponema Antibodies      Nonreactive  Nonreactive     Quantiferon Nil Tube      IU/mL 0.04     Quantiferon TB1 Tube      IU/mL 0.03     Quantiferon TB2 Tube 0.04     QUANTIFERON MITOGEN      IU/mL 2.53     TSH      0.30 - 4.20 uIU/mL  3.68    Cortisol Serum        ug/dL  9.2      Component      Latest Ref [facility] 4/28/2023  9:56 AM   Sodium      136 - 145 mmol/L    Potassium      3.4 - 5.3 mmol/L    Chloride      98 - 107 mmol/L    Carbon Dioxide (CO2)      22 - 29 mmol/L    Anion Gap      7 - 15 mmol/L    Urea Nitrogen      8.0 - 23.0 mg/dL    Creatinine      0.51 - 0.95 mg/dL    Calcium      8.8 - 10.2 mg/dL    Glucose      70 - 99 mg/dL    Alkaline Phosphatase      35 - 104 U/L    AST      10 - 35 U/L    ALT      10 - 35 U/L    Protein Total      6.4 - 8.3 g/dL    Albumin       3.5 - 5.2 g/dL    Bilirubin Total      <=1.2 mg/dL    GFR Estimate      >60 mL/min/1.73m2    WBC      4.0 - 11.0 10e3/uL    RBC Count      3.80 - 5.20 10e6/uL    Hemoglobin      11.7 - 15.7 g/dL    Hematocrit      35.0 - 47.0 %    MCV      78 - 100 fL    MCH      26.5 - 33.0 pg    MCHC      31.5 - 36.5 g/dL    RDW      10.0 - 15.0 %    Platelet Count      150 - 450 10e3/uL    Quantiferon-TB Gold Plus Result      Negative     TB1 Ag minus Nil Value      IU/mL    TB2 Ag minus Nil Value      IU/mL    Mitogen minus Nil Result      IU/mL    Nil Result      IU/mL    Cholesterol      <200 mg/dL 196    Triglycerides      <150 mg/dL 152 (H)    HDL Cholesterol      >=50 mg/dL 36 (L)    LDL Cholesterol Calculated      <=100 mg/dL 130 (H)    Non HDL Cholesterol      <130 mg/dL 160 (H)    Angiotensin Converting Enzyme      16 - 85 U/L    Treponema Antibodies      Nonreactive     Quantiferon Nil Tube      IU/mL    Quantiferon TB1 Tube      IU/mL    Quantiferon TB2 Tube    QUANTIFERON MITOGEN      IU/mL    TSH      0.30 - 4.20 uIU/mL    Cortisol Serum        ug/dL       Component      Latest Ref Rng 9/27/2021  9:35 AM 4/28/2022  3:02 PM   Sodium      133 - 144 mmol/L  138    Potassium      3.4 - 5.3 mmol/L  3.9    Chloride      94 - 109 mmol/L  107    Carbon Dioxide      20 - 32 mmol/L  24    Anion Gap      3 - 14 mmol/L  7    Urea Nitrogen      7 - 30 mg/dL  16    Creatinine      0.52 - 1.04 mg/dL  0.65    Calcium      8.5 - 10.1 mg/dL  9.3    Glucose      70 - 99 mg/dL  98    Alkaline Phosphatase      40 - 150 U/L  119    AST      0 - 45 U/L  26    ALT      0 - 50 U/L  36    Protein Total      6.8 - 8.8 g/dL  7.8    Albumin      3.4 - 5.0 g/dL  3.8    Bilirubin Total      0.2 - 1.3 mg/dL  0.4    GFR Estimate      >60 mL/min/1.73m2  >90    Mitochondrial M2 Antibody IgG      <4.0 U/mL 140.0 (H)  210.0 (H)    OCTAVIANO interpretation      Negative   Negative    Sed Rate      0 - 30 mm/hr  12    CRP Inflammation      0.0 - 8.0 mg/L  11.0  "(H)       Legend:  (H) High       OBJECTIVE:   BP (!) 144/81   Pulse 98   Temp 97.6  F (36.4  C) (Temporal)   Ht 1.727 m (5' 8\")   Wt 72.3 kg (159 lb 6.4 oz)   LMP  (LMP Unknown)   SpO2 97%   BMI 24.24 kg/m   Estimated body mass index is 24.34 kg/m  as calculated from the following:    Height as of 4/13/23: 1.727 m (5' 8\").    Weight as of 4/13/23: 72.6 kg (160 lb 1.6 oz).  Physical Exam  General appearance -   alert, no distress  Skin - No rashes or lesions.  Head - normocephalic, atraumatic  Eyes - JAVI, EOMI, fundi exam with nondilated pupils negative.  Ears - External ears normal. Canals clear. TM's normal.  Nose/Sinuses - Nares normal. Septum midline. Mucosa normal. No drainage or sinus tenderness.  Oropharynx - No erythema, no adenopathy, no exudates.  Neck - Supple without adenopathy or thyromegaly. No bruits.  Lungs - Clear to auscultation without wheezes/rhonchi.  Slight prolonged expiratory phase.    Heart - Regular rate and rhythm without murmurs, clicks, or gallops.  Nodes - No supraclavicular, axillary, or inguinal adenopathy palpable.  Breasts - deferred  Abdomen - Abdomen soft, non-tender. BS normal. No masses or hepatosplenomegaly palpable. No bruits.  Extremities -No cyanosis, clubbing or edema.    Musculoskeletal - Spine ROM normal. Muscular strength intact.  DIP and PIP joints of hands with mild osteoarthritis thickening.  No increased warmth erythema  Peripheral pulses - radial=4/4, femoral=4/4, posterior tibial=4/4, dorsalis pedis=4/4,  Neuro - Gait normal. Reflexes normal and symmetric. Sensation grossly WNL.  Genital/Rectal - deferred          ASSESSMENT / PLAN:   1. Medicare annual wellness visit, initial  See plan discussion below for healthcare maintenance.  Otherwise up-to-date.  Has hearing aids but not using at this time.    2. Hyperlipidemia LDL goal <100  Uncontrolled.  Increase CAD risk.  Will start atorvastatin and repeat labs in 6 weeks  - atorvastatin (LIPITOR) 20 MG " tablet; Take 1 tablet (20 mg) by mouth daily  Dispense: 30 tablet; Refill: 11  - Lipid panel reflex to direct LDL Fasting; Future  - Hepatic function panel; Future  - CK total; Future    3. Chronic obstructive pulmonary disease, unspecified COPD type (H)  Controlled.  Continue Anoro along with as needed albuterol.  Smoking cessation counseling as below    4. Pulmonary nodules  Left lung abnormality seen on previous CT.  Will repeat in mid June per radiology recommendation.  - CT Chest w/o Contrast; Future    5. Antimitochondrial antibody positive  Mitochondrial antibody elevation but normal liver on MRI and normal alkaline phosphatase.  Repeat future lab as ordered  - Mitochondrial M2 Antibody IgG; Future  - CRP, inflammation; Future    6. Pancreatic cyst  Radiology recommending repeat MRI abdomen in March 2024 but will also have patient see [facility] for consultation  In addition. Pt to see Dr Lim. Pt will call for appt  - Adult GI  Referral - Consult Only; Future    7. Tobacco use disorder  Counseled regarding smoking cessation.  Declines at this time    8. Elevated BP without diagnosis of hypertension  Pressure elevated today.  Recheck with pharmacy in 6 weeks when due for fasting labs    9. Need for prophylactic vaccination against Streptococcus pneumoniae (pneumococcus)  Candidate for pneumococcal vaccination  - PNEUMOCOCCAL 20 VALENT CONJUGATE (PREVNAR 20)      Patient has been advised of split billing requirements and indicates understanding: Yes      COUNSELING:  Reviewed preventive health counseling, as reflected in patient instructions        She reports that she has been smoking cigarettes. She has a 10.00 pack-year smoking history. She has never used smokeless tobacco.  Nicotine/Tobacco Cessation Plan:   Information offered: Patient not interested at this time      Appropriate preventive services were discussed with this patient, including applicable screening as appropriate for cardiovascular  disease, diabetes, osteopenia/osteoporosis, and glaucoma.  As appropriate for age/gender, discussed screening for colorectal cancer, prostate cancer, breast cancer, and cervical cancer. Checklist reviewing preventive services available has been given to the patient.    Reviewed patients plan of care and provided an AVS. The Basic Care Plan (routine screening as documented in Health Maintenance) for [place] meets the Care Plan requirement. This Care Plan has been established and reviewed with the Patient.         PLAN:  Continue current medications  Start atorvastatin 20 mg tablet, 1 tablet daily in the evening for cholesterol management  Use MyChart or call the appointment line at [phone] to schedule a fasting lab appointment at the nearest [facility] in 5 to 6 weeks  Prevnar 20 vaccination today for pneumonia risk reduction  Consider COVID booster vaccination at any pharmacy in the next few weeks  CT of the chest in mid June.  May have done at [facility].  Central scheduling should call you to schedule or you may call [phone]  Referral to [facility] regarding pancreatic cyst.  Call [phone] to schedule appointment with Dr. Marissa Lim  I encourage you to stop all smoking.  If  willing to quit in the future,  contact  [employer] [facility] toll-free number (4-923-QUIT-NOW) or through the internet  ([employer]) for free nicotine supplementation treatment and/or counseling  Get a blood pressure recheck   in 6 weeks at the  [facility] pharmacy when here for follow-up lab appointment also. You will need to go to [email or web address] to schedule the blood pressure check appointment.  Pt was informed regarding extra E&M billing for management of new or established medical issues not related to today's wellness/screening visit         Leonardo Angela MD  [facility]    Identified Health Risks:    I have reviewed Opioid Use Disorder and  Substance Use Disorder risk factors and  NA      The patient was provided with written information regarding signs of hearing loss.

## 2023-05-02 NOTE — PATIENT INSTRUCTIONS
Continue current medications  Start atorvastatin 20 mg tablet, 1 tablet daily in the evening for cholesterol management  Use IronGatehart or call the appointment line at [phone] to schedule a fasting lab appointment at the nearest [place] clinic in 5 to 6 weeks  Prevnar 20 vaccination today for pneumonia risk reduction  Consider COVID booster vaccination at any pharmacy in the next few weeks  CT of the chest in mid June.  May have done at [facility].  Central scheduling should call you to schedule or you may call [phone]  Referral to [facility] regarding pancreatic cyst.  Call [phone] to schedule appointment with Dr. Marissa Lim  I encourage you to stop all smoking.  If  willing to quit in the future,  contact  [facility] [facility] toll-free number (9-451-QUIT-NOW) or through the internet  (quitpartnermn.com) for free nicotine supplementation treatment and/or counseling   Get a blood pressure recheck   in 6 weeks at the  [facility] pharmacy when here for follow-up lab appointment also. You will need to go to [email or web address] to schedule the blood pressure check appointment.  Pt was informed regarding extra E&M billing for management of new or established medical issues not related to today's wellness/screening visit  Patient Education   Personalized Prevention Plan  You are due for the preventive services outlined below.  Your care team is available to assist you in scheduling these services.  If you have already completed any of these items, please share that information with your care team to update in your medical record.  Health Maintenance Due   Topic Date Due     COPD Action Plan  Never done     Zoster (Shingles) Vaccine (2 of 2) 08/03/2021     Osteoporosis Screening  04/26/2022     ANNUAL REVIEW OF HM ORDERS  06/09/2022     COVID-19 Vaccine (5 - Pfizer series) 02/24/2023       Signs of Hearing Loss  Hearing loss is a problem shared by many people. In fact, it's one of  the most common health problems, particularly as people age. Most people aged 65 and older have some hearing loss. By age 80, almost everyone does. Hearing loss often occurs slowly over the years. So, you may not realize your hearing has gotten worse.   When sudden hearing loss occurs, it's important to contact your healthcare provider right away. Your provider will do a medical exam and a hearing exam as soon as possible. This is to help find the cause and type of your sudden hearing loss. Based on your diagnosis, your healthcare provider will discuss possible treatments.      Hearing much better with one ear can be a sign of hearing loss.     Have your hearing checked  Call your healthcare provider if you:     Have to strain to hear normal conversation    Have to watch other people s faces very carefully to follow what they re saying    Need to ask people to repeat what they ve said    Often misunderstand what people are saying    Turn the volume of the television or radio up so high that others complain    Feel that people are mumbling when they re talking to you    Find that the effort to hear leaves you feeling tired and irritated    Notice, when using the phone, that you hear better with one ear than the other  [employer] last reviewed this educational content on 6/1/2022 2000-2022 [employer]. All rights reserved. This information is not intended as a substitute for professional medical care. Always follow your healthcare professional's instructions.

## 2023-05-24 ENCOUNTER — PATIENT OUTREACH (OUTPATIENT)
Dept: CARE COORDINATION | Facility: CLINIC | Age: 68
End: 2023-05-24
Payer: COMMERCIAL

## 2023-05-24 ASSESSMENT — ACTIVITIES OF DAILY LIVING (ADL): DEPENDENT_IADLS:: INDEPENDENT

## 2023-05-24 NOTE — PROGRESS NOTES
Clinic Care Coordination Contact  [facility]/Voicemail    Referral Source: Care Team  Clinical Data: Care Coordinator Outreach  Outreach attempted x 1.  Left message on patient's voicemail with call back information and requested return call.    Plan: Care Coordinator will try to reach patient again in 1 month.     Priya Wheat RN, BSN, PHN  Primary Care / Care Coordinator   [facility]  [email or web address]   [phone]

## 2023-05-24 NOTE — LETTER
[facility] [place]  Patient Centered Plan of Care  About Me:        Patient Name:  Irene Haile    YOB: 1955  Age:         67 year old   Edy MRN:    [id number] Telephone Information:  Home Phone [phone]   Mobile [phone]       Address:  [address] [place] [address] Email address:  [email or web address]      Emergency Contact(s)    Name Relationship Lgl Grd Work Phone Home Phone Mobile Phone   SRINATH ESCOBEDO Spouse  [phone]           Primary language:  English     needed? No   [place] [employer]:  [phone] op. 1  Other communication barriers:None    Preferred Method of Communication:  Vazquez  Current living arrangement: I live in a private home with spouse    Mobility Status/ Medical Equipment: Independent    Health Maintenance  Health Maintenance Reviewed: Due/Overdue   Health Maintenance Due   Topic Date Due     COPD ACTION PLAN  Never done     ZOSTER IMMUNIZATION (2 of 2) 08/03/2021     DEXA  04/26/2022     COVID-19 Vaccine (5 - Pfizer series) 02/24/2023     My Access Plan  Medical Emergency 911   Primary Clinic Line [facility]* - [phone]   24 Hour Appointment Line [phone] or  [phone]) (toll-free)   24 Hour Nurse Line [phone] (toll-free)   Preferred Urgent Care [facility], [phone]       Preferred Hospital [facility]  [phone]       Preferred Pharmacy [id number] [place], [place] - [address] AT [place] [address] & [facility]     Behavioral Health Crisis Line The National Suicide Prevention Lifeline at 1-493.726.3992 or Text/Call 988     My Care Team Members  Patient Care Team         Relationship Specialty Notifications Start End    Leonardo Angela MD PCP - General Internal Medicine  7/17/10     Phone: [phone] Fax: [phone] [address] [place] [address]  "[id number]    Leonardo Angela MD Assigned PCP   2/14/21     Phone: [phone] Fax: [phone]         [address] [facility] [address]    Priya Wheat, RN Lead Care Coordinator  Admissions 9/14/21     Shikha Riley MD MD Pulmonary Disease  9/29/21     Phone: [phone] Fax: [phone]         [address] [facility] [address]    Shikha Riley MD Assigned Pulmonology Provider   12/19/21     Phone: [phone] Fax: [phone]         [address] [facility] [address]    Juan Schneider MD Assigned Musculoskeletal Provider   5/15/22     Phone: [phone] Fax: [phone]         [address] [facility] [address] [facility] [address]              My Care Plans  Self Management and Treatment Plan  Care Plan  Care Plan: Complete health maintenance and care gaps       Problem: Lifestyle choices       Goal: Medical       Start Date: 9/14/2021 Expected End Date: 6/20/2023    This Visit's Progress: 70% Recent Progress: 60%    Note:     Goal Statement: I will complete my recommended overdue health maintenance/care gaps.      Strengths: Strong advocate for self  Patient expressed understanding of goal: Yes  Action steps to achieve this goal:  1. I will try to quit smoking; on 9/10/2021 \"cold turkey\" x 2 weeks; Started back smoking 10/2021  2. I will follow up with my providers as scheduled/recommended (Follow up MRI/MRCP TBD Due 3/2024)  3. I will take my medications as prescribed  4.   I will discuss, review, schedule and complete recommended overdue health maintenance with my primary care provider  5.   I will contact my care team with questions, concerns, support needs  6.   I will use the clinic as a resource and I understand I can contact my clinic with 24/7 after hours services available                            Care Plan: Advance Care Plan       Problem: Patient does not have a valid Health Care Directive       Goal: Complete Health Care Directive       Start Date: " 5/24/2023 Expected End Date: 8/24/2023    Note:     Goal Statement: I will complete a Health Care Directive and have this scanned into my Medical Record.  Barriers: None  Strengths: Strong advocate for self  Patient expressed understanding of goal: yes  Action steps to achieve this goal:  1. Care Coordination will mail me a Health Care Directive and any requested resources (goals worksheets, education sheets, class flyer).   2. I will complete the Health Care Directive. My signature must be either notarized or witnessed by two adults who are not named as health care agents in the document. Additionally only one of the witnesses can be employed by my health care system. If I need a notary I can check with my bank, my place of Jewish, [employer] stores, or look online at [email or web address] .  3. I will provide a copy of my Health Care Directive to my care team and/or email directly to [email or web address].   4. I can visit [email or web address] website, email [email or web address] or call [facility] at [phone] (M-Friday 6a to 5p) for more information including free classes on completing a Health Care Directive.  5. I will contact my care team with any barriers, questions or assistance needed with this goal. Care coordinator will remain available as needed.                                Action Plans on File:     Advance Care Plans/Directives Type:   -- (Full Code)      My Medical and Care Information  Problem List   Patient Active Problem List   Diagnosis     Tobacco use disorder     ACP (advance care planning)     COPD (chronic obstructive pulmonary disease) (H)     Hypothyroidism, unspecified type     Hyperlipidemia LDL goal <100     Pancreatic cyst     Positive OCTAVIANO (antinuclear antibody)      Current Medications and Allergies:    Allergies   Allergen Reactions     Amoxicillin-Pot Clavulanate       Vomiting/nausea     Augmented Betamethasone Diprop [Betamethasone] GI  Disturbance     Codeine      Insomnia       Nitrofurantoin      Nausea, loss appetite     Shingrix [Zoster Vac Recomb Adjuvanted]      Severe body pain for 3-4 weeks     Current Outpatient Medications   Medication     albuterol (PROAIR HFA/PROVENTIL HFA/VENTOLIN HFA) 108 (90 Base) MCG/ACT inhaler     ANORO ELLIPTA 62.5-25 MCG/ACT oral inhaler     atorvastatin (LIPITOR) 20 MG tablet     levothyroxine (SYNTHROID/LEVOTHROID) 100 MCG tablet     No current facility-administered medications for this visit.     Care Coordination Start Date: 9/14/2021   Frequency of Care Coordination: monthly       Form Last Updated: 05/24/2023

## 2023-06-07 ENCOUNTER — LAB (OUTPATIENT)
Dept: LAB | Facility: CLINIC | Age: 68
End: 2023-06-07
Payer: COMMERCIAL

## 2023-06-07 ENCOUNTER — TRANSFERRED RECORDS (OUTPATIENT)
Dept: HEALTH INFORMATION MANAGEMENT | Facility: CLINIC | Age: 68
End: 2023-06-07

## 2023-06-07 DIAGNOSIS — E78.5 HYPERLIPIDEMIA LDL GOAL <100: ICD-10-CM

## 2023-06-07 DIAGNOSIS — R76.8 ANTIMITOCHONDRIAL ANTIBODY POSITIVE: ICD-10-CM

## 2023-06-07 LAB
ALBUMIN SERPL BCG-MCNC: 4 G/DL (ref 3.5–5.2)
ALP SERPL-CCNC: 107 U/L (ref 35–104)
ALT SERPL W P-5'-P-CCNC: 37 U/L (ref 10–35)
AST SERPL W P-5'-P-CCNC: 41 U/L (ref 10–35)
BILIRUB DIRECT SERPL-MCNC: <0.2 MG/DL (ref 0–0.3)
BILIRUB SERPL-MCNC: 0.6 MG/DL
CHOLEST SERPL-MCNC: 135 MG/DL
CK SERPL-CCNC: 72 U/L (ref 26–192)
CRP SERPL-MCNC: 10.7 MG/L
HDLC SERPL-MCNC: 39 MG/DL
LDLC SERPL CALC-MCNC: 77 MG/DL
NONHDLC SERPL-MCNC: 96 MG/DL
PROT SERPL-MCNC: 7.1 G/DL (ref 6.4–8.3)
TRIGL SERPL-MCNC: 94 MG/DL

## 2023-06-07 PROCEDURE — 86381 MITOCHONDRIAL ANTIBODY EACH: CPT

## 2023-06-07 PROCEDURE — 86140 C-REACTIVE PROTEIN: CPT

## 2023-06-07 PROCEDURE — 36415 COLL VENOUS BLD VENIPUNCTURE: CPT

## 2023-06-07 PROCEDURE — 80076 HEPATIC FUNCTION PANEL: CPT

## 2023-06-07 PROCEDURE — 80061 LIPID PANEL: CPT

## 2023-06-07 PROCEDURE — 82550 ASSAY OF CK (CPK): CPT

## 2023-06-09 LAB — MITOCHONDRIA M2 IGG SER-ACNC: 45 U/ML

## 2023-06-15 ENCOUNTER — HOSPITAL ENCOUNTER (OUTPATIENT)
Dept: CT IMAGING | Facility: CLINIC | Age: 68
Discharge: HOME | End: 2023-06-15
Attending: INTERNAL MEDICINE | Admitting: INTERNAL MEDICINE
Payer: COMMERCIAL

## 2023-06-15 DIAGNOSIS — R91.8 PULMONARY NODULES: ICD-10-CM

## 2023-06-15 PROCEDURE — 71250 CT THORAX DX C-: CPT

## 2023-06-20 ENCOUNTER — PATIENT OUTREACH (OUTPATIENT)
Dept: CARE COORDINATION | Facility: CLINIC | Age: 68
End: 2023-06-20
Payer: COMMERCIAL

## 2023-06-20 ASSESSMENT — ACTIVITIES OF DAILY LIVING (ADL): DEPENDENT_IADLS:: INDEPENDENT

## 2023-06-20 NOTE — PROGRESS NOTES
Clinic Care Coordination Contact  [facility]/Voicemail    Referral Source: Care Team  Clinical Data: Care Coordinator Outreach  Outreach attempted x 1.  Left message on patient's voicemail with call back information and requested return call.    Plan: Care Coordinator will try to reach patient again in 1 month.     Priya Wheat RN, BSN, PHN  Primary Care / Care Coordinator   [facility]  [email or web address]   [phone]

## 2023-06-20 NOTE — LETTER
[facility]  Patient Centered Plan of Care  About Me:        Patient Name:  Irene Haile    YOB: 1955  Age:         67 year old   Edy MRN:    [id number] Telephone Information:  Home Phone [phone]   Mobile [phone]       Address:  [address] [place] [address] Email address:  [email or web address]      Emergency Contact(s)    Name Relationship Lgl Grd Work Phone Home Phone Mobile Phone   SRINATH ESCOBEDO Spouse  [phone]           Primary language:  English     needed? No   [place] [employer]:  [phone] op. 1  Other communication barriers:None    Preferred Method of Communication:  Vazquez  Current living arrangement: I live in a private home with spouse (Srinath, )    Mobility Status/ Medical Equipment: Independent    Health Maintenance  Health Maintenance Reviewed: Due/Overdue   Health Maintenance Due   Topic Date Due     COPD ACTION PLAN  Never done     ZOSTER IMMUNIZATION (2 of 2) 08/03/2021     DEXA  04/26/2022     COVID-19 Vaccine (5 - Pfizer series) 02/24/2023     My Access Plan  Medical Emergency 911   Primary Clinic Line [facility]* - [phone]   24 Hour Appointment Line [phone] or  [phone] (toll-free)   24 Hour Nurse Line [phone] (toll-free)   Preferred Urgent Care [facility], [phone]       [facility]  [phone]       Preferred Pharmacy [id number] - [place], [place] - [address] AT [place] [address] & [facility]     Behavioral Health Crisis Line The National Suicide Prevention Lifeline at 1-943.347.5408 or Text/Call 318     My Care Team Members  Patient Care Team         Relationship Specialty Notifications Start End    VeLeonardo tejada MD PCP - General Internal Medicine  7/17/10     Phone: [phone] Fax: [phone] [address]   "[facility] [address]    Leonardo Angela MD Assigned PCP   2/14/21     Phone: [phone] Fax: [phone]         [address] [facility] [address]    Priya Wheat, RN Lead Care Coordinator  Admissions 9/14/21     Shikha Riley MD MD Pulmonary Disease  9/29/21     Phone: [phone] Fax: [phone]         [address] [facility] [address] [facility] [address]    Shikha Riley MD Assigned Pulmonology Provider   12/19/21     Phone: [phone] Fax: [phone]         [address] [facility] [facility] [address]    Juan Schneider MD Assigned Musculoskeletal Provider   5/15/22     Phone: [phone] Fax: [phone]         [address] [facility] [address] [facility] [address]              My Care Plans  Self Management and Treatment Plan  Care Plan  Care Plan: Complete health maintenance and care gaps       Problem: Lifestyle choices       Goal: Medical       Start Date: 9/14/2021 Expected End Date: 9/20/2023    This Visit's Progress: 70% Recent Progress: 70%    Note:     Goal Statement: I will complete my recommended overdue health maintenance/care gaps.      Strengths: Strong advocate for self  Patient expressed understanding of goal: Yes  Action steps to achieve this goal:  1. I will try to quit smoking; on 9/10/2021 \"cold turkey\" x 2 weeks; Started back smoking 10/2021  2. I will follow up with my providers as scheduled/recommended (Follow up MRI/MRCP TBD Due 3/2024)  3. I will take my medications as prescribed  4.   I will discuss, review, schedule and complete recommended overdue health maintenance with my primary care provider  5.   I will contact my care team with questions, concerns, support needs  6.   I will use the clinic as a resource and I understand I can contact my clinic with 24/7 after hours services available                            Care Plan: Advance Care Plan       Problem: Patient does not have a valid Health Care Directive       Goal: Complete Health Care Directive       " Start Date: 5/24/2023 Expected End Date: 9/20/2023    This Visit's Progress: 10%    Note:     Goal Statement: I will complete a Health Care Directive and have this scanned into my Medical Record.  Barriers: None  Strengths: Strong advocate for self  Patient expressed understanding of goal: yes  Action steps to achieve this goal:  1. Care Coordination will mail me a Health Care Directive and any requested resources (goals worksheets, education sheets, class flyer).   2. I will complete the Health Care Directive. My signature must be either notarized or witnessed by two adults who are not named as health care agents in the document. Additionally only one of the witnesses can be employed by my health care system. If I need a notary I can check with my bank, my place of [employer], [employer] stores, or look online at www.World [email or web address] .  3. I will provide a copy of my Health Care Directive to my care team and/or email directly to [email or web address].   4. I can visit [email or web address] website, email [email or web address] or call [facility] at [phone] (M-Friday 6a to 5p) for more information including free classes on completing a Health Care Directive.  5. I will contact my care team with any barriers, questions or assistance needed with this goal. Care coordinator will remain available as needed.                                Action Plans on File:     Advance Care Plans/Directives Type:   -- (Full Code)      My Medical and Care Information  Problem List   Patient Active Problem List   Diagnosis     Tobacco use disorder     ACP (advance care planning)     COPD (chronic obstructive pulmonary disease) (H)     Hypothyroidism, unspecified type     Hyperlipidemia LDL goal <100     Pancreatic cyst     Positive OCTAVIANO (antinuclear antibody)      Current Medications and Allergies:    Allergies   Allergen Reactions     Amoxicillin-Pot Clavulanate       Vomiting/nausea     Augmented  Betamethasone Diprop [Betamethasone] GI Disturbance     Codeine      Insomnia       Nitrofurantoin      Nausea, loss appetite     Shingrix [Zoster Vac Recomb Adjuvanted]      Severe body pain for 3-4 weeks     Current Outpatient Medications   Medication     albuterol (PROAIR HFA/PROVENTIL HFA/VENTOLIN HFA) 108 (90 Base) MCG/ACT inhaler     ANORO ELLIPTA 62.5-25 MCG/ACT oral inhaler     atorvastatin (LIPITOR) 20 MG tablet     levothyroxine (SYNTHROID/LEVOTHROID) 100 MCG tablet     No current facility-administered medications for this visit.         Care Coordination Start Date: 9/14/2021   Frequency of Care Coordination: monthly       Form Last Updated: 06/20/2023

## 2023-06-28 ENCOUNTER — TRANSFERRED RECORDS (OUTPATIENT)
Dept: HEALTH INFORMATION MANAGEMENT | Facility: CLINIC | Age: 68
End: 2023-06-28
Payer: COMMERCIAL

## 2023-07-10 ENCOUNTER — NURSE TRIAGE (OUTPATIENT)
Dept: INTERNAL MEDICINE | Facility: CLINIC | Age: 68
End: 2023-07-10

## 2023-07-10 NOTE — TELEPHONE ENCOUNTER
"Patient's symptoms are completely resolved. Writer began triage protocol before realizing symptoms had completely resolved - triage info gathered for PCP review.      Onset: after taking Lipitor for 20-30 days; started taking prescription in 05/2023  Location: \"all my joints, my knees, my elbows\"  Pain: \"stiff, achy\"; 6-7/10 constant  Cause: \"I'm pretty sure it was the meds\"; patient reports no new medications, work or exercise  Resolution: One week after discontinuing medication; patient reports that the pain has completely resolved - \"I feel great\"  Other symptoms: denies    Patient wondering if PCP would like to prescribe an alternative medication.     Callback: [phone] ok to leave a detailed vm    Graciela Mcmahon RN  -[facility]      Additional Information    Negative: Shock suspected (e.g., cold/pale/clammy skin, too weak to stand, low BP, rapid pulse)    Negative: Similar pain previously and it was from 'heart attack'    Negative: Similar pain previously and it was from 'angina', and not relieved by nitroglycerin    Negative: Sounds like a life-threatening emergency to the triager    Negative: Chest pain    Negative: Followed an elbow injury    Negative: Wound looks infected    Negative: Difficulty breathing or unusual sweating (e.g., sweating without exertion)    Negative: Age > 40 and associated chest or jaw pain, and pain lasting > 5 minutes    Negative: Red area or streak and fever    Negative: Swollen joint and fever    Negative: Entire arm is swollen    Negative: Patient sounds very sick or weak to the triager    Negative: SEVERE pain (e.g., excruciating, unable to do any normal activities)    Negative: Weakness (i.e., loss of strength) in hand or fingers  (Exception: Not truly weak; hand feels weak because of pain.)    Negative: Swollen joint    Negative: Fluid-filled sack located directly over point of elbow    Negative: Looks like a boil, infected sore, deep ulcer, or other " infected rash (spreading redness, pus)    Negative: Painful rash with multiple small blisters grouped together (i.e., dermatomal distribution or 'band' or 'stripe')    Negative: Numbness (i.e., loss of sensation) in hand or fingers    Negative: Can't move joint normally (bend and straighten completely)    Negative: MODERATE pain (e.g., interferes with normal activities) and present > 3 days    Negative: Patient wants to be seen    Negative: MILD pain (e.g., does not interfere with normal activities) and present > 7 days    Negative: Elbow pain is a chronic symptom (recurrent or ongoing AND lasting > 4 weeks)    Negative: Elbow pain    Negative: Caused by bumping elbow and had brief (now gone) burning pain shooting (radiating) into hand and fingers    Negative: Caused by overuse from recent vigorous activity (e.g., golf, tennis, throwing a baseball)    Protocols used: ELBOW PAIN-A-OH

## 2023-07-25 ENCOUNTER — PATIENT OUTREACH (OUTPATIENT)
Dept: NURSING | Facility: CLINIC | Age: 68
End: 2023-07-25
Payer: COMMERCIAL

## 2023-07-25 DIAGNOSIS — E78.5 HYPERLIPIDEMIA LDL GOAL <100: ICD-10-CM

## 2023-07-25 DIAGNOSIS — E78.5 HYPERLIPIDEMIA LDL GOAL <100: Primary | ICD-10-CM

## 2023-07-25 RX ORDER — ROSUVASTATIN CALCIUM 10 MG/1
10 TABLET, COATED ORAL DAILY
Qty: 30 TABLET | Refills: 11 | Status: SHIPPED | OUTPATIENT
Start: 2023-07-25 | End: 2023-07-26

## 2023-07-25 ASSESSMENT — ACTIVITIES OF DAILY LIVING (ADL): DEPENDENT_IADLS:: INDEPENDENT

## 2023-07-25 NOTE — TELEPHONE ENCOUNTER
Called and spoke to the patient. Relayed message from the provider. Assisted in getting the patient scheduled for a fasting lab appointment.     Appointments in Next Year      Aug 29, 2023  7:45 AM  [facility]  [facility] ([facility] ) [phone]     Patient expressed understanding and had no additional questions at this time.     Rabia Chiang RN

## 2023-07-25 NOTE — PROGRESS NOTES
"Clinic Care Coordination Contact  Follow Up Progress Note      Assessment:   Patient states that she called approximately 2 weeks ago regarding possibly changing her Lipitor to another statin due to side effects of Lipitor (\"all joints in my body ache\") and since she stopped taking approximately 2-3 weeks ago.  RNCC will reach out to Primary Care Provider for direction.    Patient has a Podiotry appointment on 7/27/2023 for \"warts\" on her feet.  Patient has a [facility] appointment on 7/28/2023 for endoscopy pre-op and has [facility] TBD as she has not heard from them to date.      Patient has continued to struggle with smoking cessation and will continue to do.  Patient has no other questions or concerns at this time.    Care Gaps:    Health Maintenance Due   Topic Date Due    COPD ACTION PLAN  Never done    ZOSTER IMMUNIZATION (2 of 2) 08/03/2021    DEXA  04/26/2022    COVID-19 Vaccine (5 - Pfizer series) 02/24/2023       Care Gaps Last addressed on 7/25/2023    Care Plans  Care Plan: Complete health maintenance and care gaps       Problem: Lifestyle choices       Goal: Medical       Start Date: 9/14/2021 Expected End Date: 9/25/2023    This Visit's Progress: 70% Recent Progress: 70%    Note:     Goal Statement: I will complete my recommended overdue health maintenance/care gaps.      Strengths: Strong advocate for self  Patient expressed understanding of goal: Yes  Action steps to achieve this goal:  I will try to quit smoking; on 9/10/2021 \"cold turkey\" x 2 weeks; Started back smoking 10/2021  I will follow up with my providers as scheduled/recommended (Follow up MRI/MRCP TBD Due 3/2024)  I will take my medications as prescribed  4.   I will discuss, review, schedule and complete recommended overdue health maintenance with my primary care provider  5.   I will contact my care team with questions, concerns, support needs  6.   I will use the clinic as a resource and I understand I can contact my clinic " with 24/7 after hours services available                            Care Plan: Advance Care Plan       Problem: Patient does not have a valid Health Care Directive       Goal: Complete Health Care Directive       Start Date: 5/24/2023 Expected End Date: 9/20/2023    This Visit's Progress: 10%    Note:     Goal Statement: I will complete a Health Care Directive and have this scanned into my Medical Record.  Barriers: None  Strengths: Strong advocate for self  Patient expressed understanding of goal: yes  Action steps to achieve this goal:  Care Coordination will mail me a Health Care Directive and any requested resources (goals worksheets, education sheets, class flyer).   I will complete the Health Care Directive. My signature must be either notarized or witnessed by two adults who are not named as health care agents in the document. Additionally only one of the witnesses can be employed by my health care system. If I need a notary I can check with my bank, my place of Islam, [employer] stores, or look online at www.[email or web address] .  I will provide a copy of my Health Care Directive to my care team and/or email directly to [email or web address].   I can visit [email or web address] website, email [email or web address] or call [facility] [employer] at [phone] (M-Friday 6a to 5p) for more information including free classes on completing a Health Care Directive.  I will contact my care team with any barriers, questions or assistance needed with this goal. Care coordinator will remain available as needed.                             Intervention/Education provided during outreach:   RNCC called and spoke with patient/caregiver; introduced self, discussed role of Care Coordination and explained reason for call    Plan:   -Patient will contact the care team with questions, concerns, support needs   -Patient will use the clinic as a resource and understands (s)he can contact the [facility]  with 24/7 after hours services available  -Care Coordinator will remain available as needed  -RNCC will follow up in one month for follow up appointments/recommendations and goal progression     Priya Wheat RN, BSN, PHN  Primary Care / Care Coordinator   [facility]  [email or web address]   [phone]

## 2023-07-25 NOTE — TELEPHONE ENCOUNTER
Call pt.   Has been off of Atorvastatin due to previous muscle pains that resolved.  Needs other treatment for cholesterol management   Pt to instead try Rosuvastatin 10mg tab, 1 tab daily for cholesterol management  Rx sent to pt's [facility] pharmacy  Pt to call  [phone] or use [employer] to schedule a future lab appointment  fasting in 5 weeks.   For fasting labs, please refrain from eating for 8 hours or more.   Drink 2 glasses of water before your lab appointment. It is fine to take your  oral medications on the morning of the lab test as usual

## 2023-07-26 RX ORDER — ROSUVASTATIN CALCIUM 10 MG/1
TABLET, COATED ORAL
Qty: 90 TABLET | Refills: 2 | Status: SHIPPED | OUTPATIENT
Start: 2023-07-26 | End: 2024-04-24

## 2023-07-26 NOTE — TELEPHONE ENCOUNTER
Prescription approved per [facility] Refill Protocol.  Rabia Chiang, RN  [facility] Triage Nurse

## 2023-07-27 ENCOUNTER — OFFICE VISIT (OUTPATIENT)
Dept: PODIATRY | Facility: CLINIC | Age: 68
End: 2023-07-27
Payer: COMMERCIAL

## 2023-07-27 VITALS
SYSTOLIC BLOOD PRESSURE: 120 MMHG | WEIGHT: 159 LBS | BODY MASS INDEX: 24.1 KG/M2 | HEIGHT: 68 IN | DIASTOLIC BLOOD PRESSURE: 78 MMHG | HEART RATE: 98 BPM

## 2023-07-27 DIAGNOSIS — L84 CALLUS: Primary | ICD-10-CM

## 2023-07-27 PROCEDURE — 99204 OFFICE O/P NEW MOD 45 MIN: CPT | Performed by: PODIATRIST

## 2023-07-27 NOTE — LETTER
"    7/27/2023         RE: Irene Haile  [address]  [place] [address]        Dear Colleague,    Thank you for referring your patient, Irene Haile, to the [facility] POD[facility]. Please see a copy of my visit note below.    Foot & Ankle Surgery  July 27, 2023    CC: \"Wart?\"    I was asked to see Irene Haile regarding the chief complaint by: Self    HPI:  Pt is a 67 year old female who presents with above complaint.  \"Year or so\" history of bilateral lower extremity issues.  Describes shooting pain \"when walking it is like walking on a rock\".  Pain 6 out of 10 \"when walking\", worse with \"walking on level surface\".  \"At this point just try to keep it moisturized\" for treatment.  She also states it feels \"tight\" along the plantar foot bilateral.  She denies back issues.  This is better with shoes.  Regarding her bunion, \"do those over-the-counter things work?\"      ROS:   Pos for CC.  The patient denies current nausea, vomiting, chills, fevers, belly pain, calf pain, chest pain or SOB.  Complete remainder of ROS is otherwise neg.    VITALS:    Vitals:    07/27/23 1337   BP: 120/78   Pulse: 98   Weight: 72.1 kg (159 lb)   Height: 1.727 m (5' 8\")       PMH:    Past Medical History:   Diagnosis Date     Axillary adenopathy 06/05/2013     COPD (chronic obstructive pulmonary disease) (H) 06/05/2013     Hypothyroidism, unspecified type 12/30/2017     Personal history of tobacco use, presenting hazards to health      Pulmonary hemorrhage 06/05/2013     UTI (urinary tract infection)        SXHX:    Past Surgical History:   Procedure Laterality Date     APPENDECTOMY  7/8/2010     BIOPSY  6/14/2013    Breast Biopsy Ultrasound-guided biopsy no malignancy     COLONOSCOPY  6/01/2021     EYE SURGERY  10/25/2017    right eye - Cataract Lens Replacement     ZC NONSPECIFIC PROCEDURE  06/01/2010    Laparoscopic appendectomy     ZZC TOTAL ABDOM HYSTERECTOMY  01/01/2002     BSO      "   MEDS:    Current Outpatient Medications   Medication     albuterol (PROAIR HFA/PROVENTIL HFA/VENTOLIN HFA) 108 (90 Base) MCG/ACT inhaler     ANORO ELLIPTA 62.5-25 MCG/ACT oral inhaler     levothyroxine (SYNTHROID/LEVOTHROID) 100 MCG tablet     rosuvastatin (CRESTOR) 10 MG tablet     No current facility-administered medications for this visit.       ALL:     Allergies   Allergen Reactions     Amoxicillin-Pot Clavulanate       Vomiting/nausea     Atorvastatin      Muscle achiness       Augmented Betamethasone Diprop [Betamethasone] GI Disturbance     Codeine      Insomnia       Nitrofurantoin      Nausea, loss appetite     Shingrix [Zoster Vac Recomb Adjuvanted]      Severe body pain for 3-4 weeks       FMH:    Family History   Problem Relation Age of Onset     Diabetes Brother         Diag. age 41     Breast Cancer Mother      Cancer Mother         All over her body     Alcohol/Drug Father         Has Liver Problems     Cancer - colorectal Brother         before age 50     Cancer Brother         liver cancer      Family History Negative Brother         ulcerative colitis     Family History Negative Sister      Sjogren's Daughter      Colon Cancer Brother         Was caught through another surgery and removed successful       SocHx:    Social History     Socioeconomic History     Marital status:      Spouse name: Larry     Number of children: 2     Years of education: 15     Highest education level: Not on file   Occupational History     Occupation: Clerical     Employer: [employer]    Tobacco Use     Smoking status: Every Day     Packs/day: 0.75     Years: 30.00     Pack years: 22.50     Types: Cigarettes     Last attempt to quit: 9/10/2021     Years since quittin.8     Smokeless tobacco: Never     Tobacco comments:     Very hard to quit since Covid in 2020   Vaping Use     Vaping Use: Never used   Substance and Sexual Activity     Alcohol use: No     Drug use: No     Sexual activity: Not  Currently     Partners: Male     Birth control/protection: Female Surgical, None     Comment: hysterectomy-2000   Other Topics Concern     Parent/sibling w/ CABG, MI or angioplasty before 65F 55M? Not Asked   Social History Narrative     Not on file     Social Determinants of Health     Financial Resource Strain: Low Risk  (10/4/2021)    Overall Financial Resource Strain (CARDIA)      Difficulty of Paying Living Expenses: Not very hard   Food Insecurity: No Food Insecurity (10/4/2021)    Hunger Vital Sign      Worried About Running Out of Food in the Last Year: Never true      Ran Out of Food in the Last Year: Never true   Transportation Needs: No Transportation Needs (10/4/2021)    PRAPARE - Transportation      Lack of Transportation (Medical): No      Lack of Transportation (Non-Medical): No   Physical Activity: Not on file   Stress: Stress Concern Present (10/4/2021)    [employer] - Occupational Stress Questionnaire      Feeling of Stress : To some extent   Social Connections: Moderately Isolated (10/4/2021)    Social Connection and Isolation Panel [NHANES]      Frequency of Communication with Friends and Family: More than three times a week      Frequency of Social Gatherings with Friends and Family: Once a week      Attends Episcopal Services: Never      Active Member of Clubs or Organizations: No      Attends Club or Organization Meetings: Never      Marital Status:    Intimate Partner Violence: Not on file   Housing Stability: Low Risk  (10/4/2021)    Housing Stability Vital Sign      Unable to Pay for Housing in the Last Year: No      Number of Places Lived in the Last Year: 1      Unstable Housing in the Last Year: No           EXAMINATION:  Gen:   No apparent distress  Neuro:   A&Ox3, no deficits  Psych:    Answering questions appropriately for age and situation with normal affect  Head:    NCAT  Eye:    Visual scanning without deficit  Ear:    Response to auditory  stimuli wnl  Lung:    Non-labored breathing on RA noted  Abd:    NTND per patient report  Lymph:    Neg for pitting/non-pitting edema BLE  Vasc:    Pulses palpable, CFT minimally delayed  Neuro:    Light touch sensation intact to all sensory nerve distributions without paresthesias  Derm:    IPK subfifth and plantar medial left first MPJ  MSK:    Normal MPJ pain bilateral.  Right 3rd = 2nd > 1st, 4th interspace pain.  Bunion left with bump and adjacent callus pain.  Calf:    Neg for redness, swelling or tenderness      Assessment:  67 year old female with painful nucleated hyperkeratotic lesion bilateral; right forefoot/interspace pain; left bunion      Medical Decision Making/Plan:  Discussed etiologies, anatomy and options  1.  Painful nucleated hyperkeratotic lesion bilateral  -I personally reviewed and interpreted the patient's lower extremity history pertinent to today's visit, including imaging/labs, in preparation for initiating a treatment program.  -We discussed that many hyper-keratotic lesions are caused by pressure or shearing forces on the skin, and these can often be treated sufficiently with shoes, inserts and local tissue debridement.  Some lesions, however, can have a deep core, and while home treatments are helpful, occasional clinical debridement may be necessary.  In certain cases, surgical excision may be required if no other treatments have proven sufficient.  -Our home instruction handout was dispensed, detailing home therapies to minimize regrowth of the hyperkeratotic tissue.    -we discussed a rough estimate of the cost of debridement in clinic, and how insurance may not cover the cost meaning the patient may be responsible.    -This is not covered by her insurance.  She declined debridement today  -Our routine footcare handout was dispensed for future nail/callus care    2.  Right forefoot/interspace pain  -Advised comfortable shoes; minimize shoeless walking based on symptoms  -OTC insert  for arch support and stress relief on foot.  Discontinue if this exacerbates symptoms  -RICE/NSAID versus Tylenol as needed based on pain  -Consider prescription topical/oral NSAID, diagnostic/therapeutic injection    3.  Bunion left foot  -Conservatively, we discussed accommodative shoes, padding, RICE/NSAID versus Tylenol as needed based on pain  -We discussed that the padding and spacers/splints will help to alleviate pressure and decrease symptoms while utilizing them.  However, do not use them with anticipation that they will correct the bunion  -Surgically, we discussed osteotomy versus midfoot fusion as well as a generic recovery for patient information    Follow up: As needed or sooner with acute issues      Patient's medical history was reviewed today      Polo Moreau DPM FACFAS FACFAOM  Podiatric Foot & Ankle Surgeon  [facility]  [phone]    Disclaimer: This note consists of symbols derived from keyboarding, dictation and/or voice recognition software. As a result, there may be errors in the script that have gone undetected. Please consider this when interpreting information found in this chart.          Again, thank you for allowing me to participate in the care of your patient.        Sincerely,        Polo Moreau DPM, SULMA

## 2023-07-27 NOTE — PROGRESS NOTES
"Foot & Ankle Surgery  July 27, 2023    CC: \"Wart?\"    I was asked to see Irene Mcnairsiriapaxton regarding the chief complaint by: Self    HPI:  Pt is a 67 year old female who presents with above complaint.  \"Year or so\" history of bilateral lower extremity issues.  Describes shooting pain \"when walking it is like walking on a rock\".  Pain 6 out of 10 \"when walking\", worse with \"walking on level surface\".  \"At this point just try to keep it moisturized\" for treatment.  She also states it feels \"tight\" along the plantar foot bilateral.  She denies back issues.  This is better with shoes.  Regarding her bunion, \"do those over-the-counter things work?\"      ROS:   Pos for CC.  The patient denies current nausea, vomiting, chills, fevers, belly pain, calf pain, chest pain or SOB.  Complete remainder of ROS is otherwise neg.    VITALS:    Vitals:    07/27/23 1337   BP: 120/78   Pulse: 98   Weight: 72.1 kg (159 lb)   Height: 1.727 m (5' 8\")       PMH:    Past Medical History:   Diagnosis Date    Axillary adenopathy 06/05/2013    COPD (chronic obstructive pulmonary disease) (H) 06/05/2013    Hypothyroidism, unspecified type 12/30/2017    Personal history of tobacco use, presenting hazards to health     Pulmonary hemorrhage 06/05/2013    UTI (urinary tract infection)        SXHX:    Past Surgical History:   Procedure Laterality Date    APPENDECTOMY  7/8/2010    BIOPSY  6/14/2013    Breast Biopsy Ultrasound-guided biopsy no malignancy    COLONOSCOPY  6/01/2021    EYE SURGERY  10/25/2017    right eye - Cataract Lens Replacement    [facility] NONSPECIFIC PROCEDURE  06/01/2010    Laparoscopic appendectomy    [facility] TOTAL ABDOM HYSTERECTOMY  01/01/2002     BSO        MEDS:    Current Outpatient Medications   Medication    albuterol (PROAIR HFA/PROVENTIL HFA/VENTOLIN HFA) 108 (90 Base) MCG/ACT inhaler    ANORO ELLIPTA 62.5-25 MCG/ACT oral inhaler    levothyroxine (SYNTHROID/LEVOTHROID) 100 MCG tablet    rosuvastatin (CRESTOR) 10 MG tablet     No " current facility-administered medications for this visit.       ALL:     Allergies   Allergen Reactions    Amoxicillin-Pot Clavulanate       Vomiting/nausea    Atorvastatin      Muscle achiness      Augmented Betamethasone Diprop [Betamethasone] GI Disturbance    Codeine      Insomnia      Nitrofurantoin      Nausea, loss appetite    Shingrix [Zoster Vac Recomb Adjuvanted]      Severe body pain for 3-4 weeks       FMH:    Family History   Problem Relation Age of Onset    Diabetes Brother         Diag. age 41    Breast Cancer Mother     Cancer Mother         All over her body    Alcohol/Drug Father         Has Liver Problems    Cancer - colorectal Brother         before age 50    Cancer Brother         liver cancer     Family History Negative Brother         ulcerative colitis    Family History Negative Sister     Sjogren's Daughter     Colon Cancer Brother         Was caught through another surgery and removed successful       SocHx:    Social History     Socioeconomic History    Marital status:      Spouse name: Larry    Number of children: 2    Years of education: 15    Highest education level: Not on file   Occupational History    Occupation: Clerical     Employer: [employer]    Tobacco Use    Smoking status: Every Day     Packs/day: 0.75     Years: 30.00     Pack years: 22.50     Types: Cigarettes     Last attempt to quit: 9/10/2021     Years since quittin.8    Smokeless tobacco: Never    Tobacco comments:     Very hard to quit since Covid in    Vaping Use    Vaping Use: Never used   Substance and Sexual Activity    Alcohol use: No    Drug use: No    Sexual activity: Not Currently     Partners: Male     Birth control/protection: Female Surgical, None     Comment: hysterectomy-   Other Topics Concern    Parent/sibling w/ CABG, MI or angioplasty before 65F 55M? Not Asked   Social History Narrative    Not on file     Social Determinants of Health     Financial Resource Strain: Low Risk   (10/4/2021)    Overall Financial Resource Strain (CARDIA)     Difficulty of Paying Living Expenses: Not very hard   Food Insecurity: No Food Insecurity (10/4/2021)    Hunger Vital Sign     Worried About Running Out of Food in the Last Year: Never true     Ran Out of Food in the Last Year: Never true   Transportation Needs: No Transportation Needs (10/4/2021)    PRAPARE - Transportation     Lack of Transportation (Medical): No     Lack of Transportation (Non-Medical): No   Physical Activity: Not on file   Stress: Stress Concern Present (10/4/2021)    [employer] - Occupational Stress Questionnaire     Feeling of Stress : To some extent   Social Connections: Moderately Isolated (10/4/2021)    Social Connection and Isolation Panel [NHANES]     Frequency of Communication with Friends and Family: More than three times a week     Frequency of Social Gatherings with Friends and Family: Once a week     Attends Gnosticist Services: Never     Active Member of Clubs or Organizations: No     Attends Club or Organization Meetings: Never     Marital Status:    Intimate Partner Violence: Not on file   Housing Stability: Low Risk  (10/4/2021)    Housing Stability Vital Sign     Unable to Pay for Housing in the Last Year: No     Number of Places Lived in the Last Year: 1     Unstable Housing in the Last Year: No           EXAMINATION:  Gen:   No apparent distress  Neuro:   A&Ox3, no deficits  Psych:    Answering questions appropriately for age and situation with normal affect  Head:    NCAT  Eye:    Visual scanning without deficit  Ear:    Response to auditory stimuli wnl  Lung:    Non-labored breathing on RA noted  Abd:    NTND per patient report  Lymph:    Neg for pitting/non-pitting edema BLE  Vasc:    Pulses palpable, CFT minimally delayed  Neuro:    Light touch sensation intact to all sensory nerve distributions without paresthesias  Derm:    IPK subfifth and plantar medial left first  MPJ  MSK:    Normal MPJ pain bilateral.  Right 3rd = 2nd > 1st, 4th interspace pain.  Bunion left with bump and adjacent callus pain.  Calf:    Neg for redness, swelling or tenderness      Assessment:  67 year old female with painful nucleated hyperkeratotic lesion bilateral; right forefoot/interspace pain; left bunion      Medical Decision Making/Plan:  Discussed etiologies, anatomy and options  1.  Painful nucleated hyperkeratotic lesion bilateral  -I personally reviewed and interpreted the patient's lower extremity history pertinent to today's visit, including imaging/labs, in preparation for initiating a treatment program.  -We discussed that many hyper-keratotic lesions are caused by pressure or shearing forces on the skin, and these can often be treated sufficiently with shoes, inserts and local tissue debridement.  Some lesions, however, can have a deep core, and while home treatments are helpful, occasional clinical debridement may be necessary.  In certain cases, surgical excision may be required if no other treatments have proven sufficient.  -Our home instruction handout was dispensed, detailing home therapies to minimize regrowth of the hyperkeratotic tissue.    -we discussed a rough estimate of the cost of debridement in clinic, and how insurance may not cover the cost meaning the patient may be responsible.    -This is not covered by her insurance.  She declined debridement today  -Our routine footcare handout was dispensed for future nail/callus care    2.  Right forefoot/interspace pain  -Advised comfortable shoes; minimize shoeless walking based on symptoms  -OTC insert for arch support and stress relief on foot.  Discontinue if this exacerbates symptoms  -RICE/NSAID versus Tylenol as needed based on pain  -Consider prescription topical/oral NSAID, diagnostic/therapeutic injection    3.  Bunion left foot  -Conservatively, we discussed accommodative shoes, padding, RICE/NSAID versus Tylenol as needed  based on pain  -We discussed that the padding and spacers/splints will help to alleviate pressure and decrease symptoms while utilizing them.  However, do not use them with anticipation that they will correct the bunion  -Surgically, we discussed osteotomy versus midfoot fusion as well as a generic recovery for patient information    Follow up: As needed or sooner with acute issues      Patient's medical history was reviewed today      Polo Moreau DPM [facility] FACFAOM  Podiatric Foot & Ankle Surgeon  [facility]  [phone]    Disclaimer: This note consists of symbols derived from keyboarding, dictation and/or voice recognition software. As a result, there may be errors in the script that have gone undetected. Please consider this when interpreting information found in this chart.

## 2023-07-27 NOTE — NURSING NOTE
"Chief Complaint   Patient presents with    Cyst     Bilateral foot below the pad on the fifth digit       Initial /78   Pulse 98   Ht 1.727 m (5' 8\")   Wt 72.1 kg (159 lb)   LMP  (LMP Unknown)   BMI 24.18 kg/m   Estimated body mass index is 24.18 kg/m  as calculated from the following:    Height as of this encounter: 1.727 m (5' 8\").    Weight as of this encounter: 72.1 kg (159 lb).  Medications and allergies reviewed.      Priya HANNA MA    "

## 2023-07-27 NOTE — PATIENT INSTRUCTIONS
Thank you for choosing [facility] Podiatry / Foot & Ankle Surgery!    DR. DUPREE'S CLINIC LOCATIONS:     [facility] (Friday) TRIAGE LINE: [phone] [address] [facility]  APPOINTMENTS: [phone]   [place] [address] RADIOLOGY: [phone]    PHYSICAL THERAPY: [phone]    SET UP SURGERY: [phone]   [facility] (Mon-Tues AM-Thurs) BILLING QUESTIONS: [phone] [address] FAX: [phone]   [place] [address]       CALLUS / CORNS / IPKs  When there is excessive friction or pressure on the skin, the body responds by making the skin thicker to protect the deeper structures from becoming exposed. While this works well to protect the deeper structures, the thickened skin can increase pressure and pain.    CALLUS: Flat, diffuse thickening are simple calluses and they are usually caused by friction. Often these are the result of rubbing on a shoe or going barefoot.    CORNS: Calluses with a central core between the toes are called corns. These result from prominent joints on adjacent toes rubbing together. Theses are a symptom of bone malalignment and will always recur unless the underlying bones are addressed surgically.    IPKs: Calluses with a central core on the ball of the foot are usually IPKs (intractable plantar keratosis). These are caused by excessive pressure from the metatarsals, the bones that make up the ball of the foot. Often one of these bones is too long or too prominent.  Again, these will always recur unless the underlying bone issue is addressed. There is no cure for these. They will either go away by themselves, recur, or more could develop.    ROUTINE MAINTENANCE  1. File them down with a pumice stone or callus file a couple times a week.   2. An electric callus removing device. Amope Pedi Perfect Electronic Pedicure Foot File and Callus Remover can be a good option.   3. Lotion can be applied to soften the callus. A urea based cream such as Kersal or  Vanicream or thicker cream with shea butter are good options.  4. Toe spacers or toe covers can be used for corns, gel pads can be used for other lesions on the bottom of the foot.   If there is a surgical pathology noted, such as a prominent bone, often this needs to be addressed surgically to minimize recurrence. However, sometimes the lesion simply migrates to another spot after surgery, so it is not a guaranteed cure.     **If you come back to clinic for treatment, insurance does not cover it, and you would be billed. This charge could range from $100 - $227**     Here is a list of routine foot care resources, which includes toenail trimming and callus/corn management.     This is not a referral. It is your responsibility to contact the organization and your insurance to confirm cost and coverage.      ROUTINE FOOT CARE (NAIL TRIMMING / CALLUSES)      [facility]  [phone]   [facility]  [phone]  Multiple locations   [facility]  [phone] Dr. Salinas and Dr. Couch  [address] [facility] [address] [place] [address]  [phone]      BUNION (HALLUX ABDUCTO VALGUS)  A bunion is caused by muscle imbalance. The great toe is pulled toward the smaller toes. The metatarsal head is pushed outward creating a lump on the side of your foot. Imbalance is the result of foot structure and instability.   Bunions do not improve with time. They usually enlarge, however this is a fairly slow process. Shoes do not necessarily cause bunions, however, they can hasten development and definitely cause bunions to hurt.   Bunions often run in families. We inherit a certain foot structure, which may be predisposed to bunion development.   Bunion pain is likely a combination of shoes rubbing on the bump, nerve irritation, compression between the toes, joint misalignment, arthritis and altered gait.   SYMPTOMS   Bunions are usually termed mild, moderate or severe. Just because you have a bunion does not mean you have  to have pain. There are some people with very severe bunions and no pain and people with mild bunions and a lot of pain.   - Pain on the inside of your foot at the big toe joint (1st MTPJ)   - Swelling on the inside of your foot at the big toe joint   - Redness on the inside of your foot at the big toe joint   - Numbness or burning in the big toe (hallux)   - Decreased motion at the big toe joint   - Painful bursa (fluid-filled sac) on the inside of your foot at the big toe joint   - Pain while wearing shoes -especially shoes too narrow or with high heels    - Pain during activities   - Corn in between the big toe and second toe   - Callous formation on the side or bottom of the big toe or big toe joint   - Callous under the second toe joint (2nd MTPJ)   - Pain in the second toe joint   TREATMENT  Conservative (non-surgical) treatment will not make the bunion go away, but it will hopefully decrease the signs and symptoms you have and help you get rid of the pain and get you back to your activities.   1.  Wider shoes or extra depth shoes: Most bunion pain can be improved simply by wearing compatible shoes. People with bunions cannot choose footwear simply because they like the style. Your bunion should determine which shoes are to be worn. Wide shoes with nonirritating seams,soft leather and a square toe box are most compatible with a bunion. Shoes should fit appropriately right out of the box but may need to be professionally stretched and modified to accommodate the bump. Heels, dress shoes and shoes with pointed toes will not be comfortable.   2. NSAIDs   3.  Arch supports, custom inserts, padding, splints, toe spacers : Most bunion pain can be improved simply by wearing compatible shoes. People with bunions cannot choose footwear simply because they like the style. Your bunion should determine which shoes are to be worn. Wide shoes with nonirritating seams,soft leather and a square toe box are most compatible  with a bunion. Shoes should fit appropriately right out of the box but may need to be professionally stretched and modified to accommodate the bump. Heels, dress shoes and shoes with pointed toes will not be comfortable.   4.  Change activities   5.  Physical therapy  SURGERY  Surgical treatment for bunions is sometimes needed. If you are limited by pain, cannot fit in shoes comfortably and are not able to do your daily activities then surgery may be a good option for you. There are many different surgical procedures to repair bunions. Your foot and ankle surgeon will review your foot exam findings, your x-rays, your age, your health, your lifestyle, your physical activity level and discuss with you which procedure he or she would recommend. Surgical procedures for bunions range from soft tissue repair to cutting and realigning the bones. It is not recommended that you have bunion surgery for cosmetic reasons (you do not like how your foot looks) or because you want to fit in a certain pair of shoes; There is the risk that even after surgery, the bunion will reoccur 9-10% of the time.   Bunion surgery involves cutting and repositioning the bones surrounding the bunion. Pins and screws are used to hold the bones in place during the healing process. The goal of bunion surgery is to reduce the size of the bunion bump. Realignment of the toe and joint is attempted.     Some first toes cannot be forced back into normal alignment even with surgery. Surgery is helpful in most cases but does not necessarily create a normal foot.   Healing after surgery requires about six weeks of protection. This allows the bone to heal. Maximum recovery takes about one year. The scar tissue and jOint structures require this amount of time to finish the healing process. Expect stiffness, swelling and numbness during that time frame. Bunion surgery does involve side effects. Some side effects are predictable and others are less common but do  occur. A scar will be visible and could be irritated by shoes. The shoe may rub on the screw or internal pin requiring surgical removal of these fixation devices. The screw and pin would likely be left in place for a full year. The first toe may loose motion after bunion surgery. The amount of stiffness is variable. Some people never regain normal motion of the first toe. This is due to scar tissue inherent to any surgery. The first toe may drift toward the second toe or away from the second toe. Spreading of the first and second toes is a rare occurrence after bunion surgery. This can be quite bothersome and would need to be surgically repaired. Toe drift toward the second toe could result in a recurrent bunion and revision surgery. Joint fusion is one option to correct an unstable, drifting toe. This procedure straightens the toe, however, no motion remains. Fusion may be necessary to correct complications of bunion surgery or as the original procedure in severe cases.   All surgical procedures involve risk of infection, numbness, pain, delayed healing, osteotomy dislocation, blood clots, continued foot pain, etc. Bunion surgery is quite complex and should not be taken lightly.   Any skin incision can lead to infection. Deep infection might involve the bone and thus repeat surgery and six weeks of IV antibiotics. Scar tissue can cause nerve pain or numbness. This is generally temporary but can be permanent. We do not have treatments that cure nerve problems. Second toe pain could be related to altered mechanics and pressure transferred to the second toe. Most feet with bunions have pre-existing second toe problems. Delayed bone healing would lengthen the healing time. Some bones simply do not heal. This requires repeat surgery, electronic bone stimulation and/or extended protection. Smokers have an approximate 20% chance of poor bone healing. This is double that of a non-smoker. The bone cut may displace. This may  need to be repaired with a second operation. Displacement can cause jOint malalignment. Immobility after surgery can cause blood clots in the legs and lungs. This could result in death.   Foot pain is complex. Most feet hurt for more than one reason. Fixing the bunion would not necessarily create a pain free foot. Appropriate shoes, healthy body weight, avoidance of bare foot walking and moderation of activity will always be necessary to enjoy foot comfort. Your bunion may involve arthritis, which is incurable even with surgery. Long standing bunions often involve chronic irritation to the surrounding nerves. Nerve pain may not resolve even with reducing the bunion bump since permanent nerve damage may be present   Bunion surgery is nevertheless quite successful. Most surgical patients are pleased with their foot following bunion surgery. Many of the issues described above can be controlled by taking proper care of your foot during the healing process.   Your surgeon would be happy to fully describe any of the above issues. You should pursue a full understanding of the operation,recovery process and any potential problems that could develop.   PREVENTION  1.  Do not wear high heels if there is a family history of bunions.  2.  Wear shoes that have enough width and depth in the toe box  Here are exercises that may benefit people with bunions:   Toe stretches - Stretching out your toes can help keep them limber and offset foot pain. To stretch your toes, point your toes straight ahead for 5 seconds and then curl them under for 5 seconds. Repeat these stretches 10 times. These exercises can be especially beneficial if you also have hammertoes, or chronically bent toes, in addition to a bunion.   Toe flexing and sukhwinder - Press your toes against a hard surface such as a wall, to flex and stretch them; hold the position for 10 seconds and repeat three to four times. Then flex your toes in the opposite direction; hold  the position for 10 seconds and repeat three to four times.   Stretching your big toe - Using your fingers to gently pull your big toe over into proper alignment can be helpful as well. Hold your toe in position for 10 seconds and repeat three to four times.   Resistance exercises - Wrap either a towel or belt around your big toe and use it to pull your big toe toward you while simultaneously pushing forward, against the towel, with your big toe.   Ball roll - To massage the bottom of your foot, sit down, place a golf ball on the floor under your foot, and roll it around under your foot for two minutes. This can help relieve foot strain and cramping.   Towel curls - You can strengthen your toes by spreading out a small towel on the floor, curling your toes around it, and pulling it toward you. Repeat five times. Gripping objects with your toes like this can help keep your foot flexible.   Picking up marbles - Another gripping exercise you can perform to keep your foot flexible is picking up marbles with your toes. Do this by placing 20 marbles on the floor in front of you and use your foot to pick the marbles up one by one and place them in a bowl.   Walking along the beach - Whenever possible, spend time walking on sand. This can give you a gentle foot massage and also help strengthen your toes. This is especially beneficial for people who have arthritis associated with their bunions.      OVER THE COUNTER INSERTS    Most of these can be found at your local [employer], [employer], or online:    [employer]et   Sofsole Fit [facility]   Power Step   Walk-Fit ([employer])  *For heel pain* Arch Cradles  *For heel pain*       ** A good high quality over the counter insert should cost around $40-$50    ** Capsulitis/Metarasalgia - try adding a metatarsal pad on your inserts or find an insert with one built in

## 2023-07-28 ENCOUNTER — TRANSFERRED RECORDS (OUTPATIENT)
Dept: HEALTH INFORMATION MANAGEMENT | Facility: CLINIC | Age: 68
End: 2023-07-28
Payer: COMMERCIAL

## 2023-08-23 ENCOUNTER — TRANSFERRED RECORDS (OUTPATIENT)
Dept: HEALTH INFORMATION MANAGEMENT | Facility: CLINIC | Age: 68
End: 2023-08-23
Payer: COMMERCIAL

## 2023-08-25 ENCOUNTER — PATIENT OUTREACH (OUTPATIENT)
Dept: NURSING | Facility: CLINIC | Age: 68
End: 2023-08-25
Payer: COMMERCIAL

## 2023-08-25 ASSESSMENT — ACTIVITIES OF DAILY LIVING (ADL): DEPENDENT_IADLS:: INDEPENDENT

## 2023-08-25 NOTE — PROGRESS NOTES
"Clinic Care Coordination Contact  Follow Up Progress Note      Assessment:   Patient states her \"eye's\" are giving her a \"run for her money\" as far as \"getting old\".  She has a cataract and \"issues\" with her left yet \"I'm staying on top of it\".    Patient states her [facility] appointment went well and the provider wants to \"wait a year\" with regards to the cysts in her pancreas.  Patient denies fever, GI/ symptoms, N/V, or chest pain, shortness of breath.  Patient has no other questions or concerns at this time.    Care Gaps:    Health Maintenance Due   Topic Date Due    COPD ACTION PLAN  Never done    ZOSTER IMMUNIZATION (2 of 2) 08/03/2021    DEXA  04/26/2022    COVID-19 Vaccine (5 - Pfizer series) 02/24/2023    INFLUENZA VACCINE (1) 09/01/2023     Care Gaps Last addressed on 8/25/2023    Care Plans  Care Plan: Complete health maintenance and care gaps       Problem: Lifestyle choices       Goal: Medical       Start Date: 9/14/2021 Expected End Date: 9/25/2023    This Visit's Progress: 70% Recent Progress: 70%    Note:     Goal Statement: I will complete my recommended overdue health maintenance/care gaps.      Strengths: Strong advocate for self  Patient expressed understanding of goal: Yes  Action steps to achieve this goal:  I will try to quit smoking; on 9/10/2021 \"cold turkey\" x 2 weeks; Started back smoking 10/2021  I will follow up with my providers as scheduled/recommended (Follow up MRI/MRCP TBD Due 3/2024)  I will take my medications as prescribed  4.   I will discuss, review, schedule and complete recommended overdue health maintenance with my primary care provider  5.   I will contact my care team with questions, concerns, support needs  6.   I will use the clinic as a resource and I understand I can contact my clinic with 24/7 after hours services available                            Care Plan: Advance Care Plan       Problem: Patient does not have a valid Health Care Directive       Goal: Complete " Health Care Directive       Start Date: 5/24/2023 Expected End Date: 9/20/2023    This Visit's Progress: 10%    Note:     Goal Statement: I will complete a Health Care Directive and have this scanned into my Medical Record.  Barriers: None  Strengths: Strong advocate for self  Patient expressed understanding of goal: yes  Action steps to achieve this goal:  Care Coordination will mail me a Health Care Directive and any requested resources (goals worksheets, education sheets, class flyer).   I will complete the Health Care Directive. My signature must be either notarized or witnessed by two adults who are not named as health care agents in the document. Additionally only one of the witnesses can be employed by my health care system. If I need a notary I can check with my bank, my place of Church, [employer] stores, or look online at [email or web address] .  I will provide a copy of my Health Care Directive to my care team and/or email directly to [email or web address].   I can visit [email or web address] website, email [email or web address] or call  [employer] [facility] [employer] at [phone] (M-Friday 6a to 5p) for more information including free classes on completing a Health Care Directive.  I will contact my care team with any barriers, questions or assistance needed with this goal. Care coordinator will remain available as needed.                             Intervention/Education provided during outreach:   RNCC called and spoke with patient/caregiver; introduced self, discussed role of Care Coordination and explained reason for call    Plan:   -Patient will contact the care team with questions, concerns, support needs   -Patient will use the clinic as a resource and understands (s)he can contact the [facility] clinic with 24/7 after hours services available  -Care Coordinator will remain available as needed  -RNCC will follow up in one month for follow up appointments/recommendations and goal  progression     Priya Wheat RN, BSN, PHN  Primary Care / Care Coordinator   [facility]  [email or web address]   [phone]

## 2023-08-29 ENCOUNTER — LAB (OUTPATIENT)
Dept: LAB | Facility: CLINIC | Age: 68
End: 2023-08-29
Payer: COMMERCIAL

## 2023-08-29 DIAGNOSIS — E78.5 HYPERLIPIDEMIA LDL GOAL <100: ICD-10-CM

## 2023-08-29 LAB
ALBUMIN SERPL BCG-MCNC: 4 G/DL (ref 3.5–5.2)
ALP SERPL-CCNC: 150 U/L (ref 35–104)
ALT SERPL W P-5'-P-CCNC: 38 U/L (ref 0–50)
AST SERPL W P-5'-P-CCNC: 44 U/L (ref 0–45)
BILIRUB DIRECT SERPL-MCNC: <0.2 MG/DL (ref 0–0.3)
BILIRUB SERPL-MCNC: 0.5 MG/DL
CHOLEST SERPL-MCNC: 124 MG/DL
CK SERPL-CCNC: 66 U/L (ref 26–192)
HDLC SERPL-MCNC: 41 MG/DL
LDLC SERPL CALC-MCNC: 65 MG/DL
NONHDLC SERPL-MCNC: 83 MG/DL
PROT SERPL-MCNC: 7.1 G/DL (ref 6.4–8.3)
TRIGL SERPL-MCNC: 88 MG/DL

## 2023-08-29 PROCEDURE — 80061 LIPID PANEL: CPT

## 2023-08-29 PROCEDURE — 82550 ASSAY OF CK (CPK): CPT

## 2023-08-29 PROCEDURE — 80076 HEPATIC FUNCTION PANEL: CPT

## 2023-08-29 PROCEDURE — 36415 COLL VENOUS BLD VENIPUNCTURE: CPT

## 2023-09-06 ENCOUNTER — TRANSFERRED RECORDS (OUTPATIENT)
Dept: HEALTH INFORMATION MANAGEMENT | Facility: CLINIC | Age: 68
End: 2023-09-06
Payer: COMMERCIAL

## 2023-09-25 ENCOUNTER — PATIENT OUTREACH (OUTPATIENT)
Dept: CARE COORDINATION | Facility: CLINIC | Age: 68
End: 2023-09-25
Payer: COMMERCIAL

## 2023-09-25 ASSESSMENT — ACTIVITIES OF DAILY LIVING (ADL): DEPENDENT_IADLS:: INDEPENDENT

## 2023-09-25 NOTE — PROGRESS NOTES
Clinic Care Coordination Contact  [facility]/Voicemail    Referral Source: Care Team  Clinical Data: Care Coordinator Outreach  Outreach attempted x 1.  Left message on patient's voicemail with call back information and requested return call.    Plan: Care Coordinator will try to reach patient again in 1 month.     Priya Wheat RN, BSN, PHN  Primary Care / Care Coordinator   [facility]  [email or web address]   [phone]

## 2023-09-25 NOTE — LETTER
[facility]  Patient Centered Plan of Care  About Me:        Patient Name:  Irene Haile    YOB: 1955  Age:         67 year old   Edy MRN:    [id number] Telephone Information:  Home Phone [phone]   Mobile [phone]       Address:  [address] [place] [address] Email address:  [email or web address]      Emergency Contact(s)    Name Relationship Lgl Grd Work Phone Home Phone Mobile Phone   SRINATH ESCOBEDO Spouse  [phone]           Primary language:  English     needed? No   [employer]:  [phone] op. 1  Other communication barriers:None    Preferred Method of Communication:  Vazquez  Current living arrangement: I live in a private home with spouse (Srinath, )    Mobility Status/ Medical Equipment: Independent    Health Maintenance  Health Maintenance Reviewed: Due/Overdue   Health Maintenance Due   Topic Date Due    COPD ACTION PLAN  Never done    ZOSTER IMMUNIZATION (2 of 2) 08/03/2021    DEXA  04/26/2022    COVID-19 Vaccine (5 - Pfizer series) 02/24/2023    INFLUENZA VACCINE (1) 09/01/2023     My Access Plan  Medical Emergency 911   Primary Clinic Line [facility] - [phone]   24 Hour Appointment Line [phone] or  [phone]) (toll-free)   24 Hour Nurse Line [phone] (toll-free)   Preferred Urgent Care [facility], [phone]     Preferred Hospital [facility]  [phone]     [id number] - [place], [place] - [address] AT [place] [address] & [facility]     Behavioral Health Crisis Line The National Suicide Prevention Lifeline at 1-370.829.9382 or Text/Call 388     My Care Team Members  Patient Care Team         Relationship Specialty Notifications Start End    Veum, Leonardo KULKARNI MD PCP - General Internal Medicine  7/17/10     Phone: [phone] Fax:  [phone]         [address] [place] [address]    Leonardo Angela MD Assigned PCP   2/14/21     Phone: [phone] Fax: [phone]         [address] [place] [address]    Priya Wheat RN Lead Care Coordinator  Admissions 9/14/21     Shikha Riley MD MD Pulmonary Disease  9/29/21     Phone: [phone] Fax: [phone]         [address] [facility] [address] [facility] [address]    Shikha Riley MD Assigned Pulmonology Provider   12/19/21     Phone: [phone] Fax: [phone]         [address] [facility] [address] [facility] [address]    Polo Moreau DPM Assigned Musculoskeletal Provider   8/12/23     Phone: [phone] Fax: [phone]         [address] [facility] [address] [facility] [address]              My Care Plans  Self Management and Treatment Plan  Care Plan  Care Plan: Complete health maintenance and care gaps       Problem: Lifestyle choices       Goal: Medical       Start Date: 9/14/2021 Expected End Date: 12/26/2023    This Visit's Progress: 70% Recent Progress: 70%    Note:     Goal Statement: I will complete my recommended overdue health maintenance/care gaps.      Strengths: Strong advocate for self  Patient expressed understanding of goal: Yes  Action steps to achieve this goal:  I will try to quit smoking; on 9/10/2021 \"cold turkey\" x 2 weeks; Started back smoking 10/2021  I will follow up with my providers as scheduled/recommended (Follow up MRI/MRCP TBD Due 3/2024)  I will take my medications as prescribed  4.   I will discuss, review, schedule and complete recommended overdue health maintenance with my primary care provider  5.   I will contact my care team with questions, concerns, support needs  6.   I will use the clinic as a resource and I understand I can contact my clinic with 24/7 after hours services available                            Care Plan: Advance Care Plan       Problem: Patient does not have a valid Health Care Directive       Goal: " Complete Health Care Directive       Start Date: 5/24/2023 Expected End Date: 12/26/2023    This Visit's Progress: 10% Recent Progress: 10%    Note:     Goal Statement: I will complete a Health Care Directive and have this scanned into my Medical Record.  Barriers: None  Strengths: Strong advocate for self  Patient expressed understanding of goal: yes  Action steps to achieve this goal:  Care Coordination will mail me a Health Care Directive and any requested resources (goals worksheets, education sheets, class flyer).   I will complete the Health Care Directive. My signature must be either notarized or witnessed by two adults who are not named as health care agents in the document. Additionally only one of the witnesses can be employed by my health care system. If I need a notary I can check with my bank, my place of [employer], [employer] stores, or look online at www.[email or web address] .  I will provide a copy of my Health Care Directive to my care team and/or email directly to [email or web address].   I can visit [email or web address] website, email [email or web address] or call [facility] [employer] at [phone] (M-Friday 6a to 5p) for more information including free classes on completing a Health Care Directive.  I will contact my care team with any barriers, questions or assistance needed with this goal. Care coordinator will remain available as needed.                                Action Plans on File:     Advance Care Plans/Directives Type:   -- (Full Code)      My Medical and Care Information  Problem List   Patient Active Problem List   Diagnosis    Tobacco use disorder    ACP (advance care planning)    COPD (chronic obstructive pulmonary disease) (H)    Hypothyroidism, unspecified type    Hyperlipidemia LDL goal <100    Pancreatic cyst    Positive OCTAVIANO (antinuclear antibody)      Current Medications and Allergies:    Allergies   Allergen Reactions    Amoxicillin-Pot Clavulanate        Vomiting/nausea    Atorvastatin      Muscle achiness      Augmented Betamethasone Diprop [Betamethasone] GI Disturbance    Codeine      Insomnia      Nitrofurantoin      Nausea, loss appetite    Shingrix [Zoster Vac Recomb Adjuvanted]      Severe body pain for 3-4 weeks     Current Outpatient Medications   Medication    albuterol (PROAIR HFA/PROVENTIL HFA/VENTOLIN HFA) 108 (90 Base) MCG/ACT inhaler    ANORO ELLIPTA 62.5-25 MCG/ACT oral inhaler    levothyroxine (SYNTHROID/LEVOTHROID) 100 MCG tablet    rosuvastatin (CRESTOR) 10 MG tablet     No current facility-administered medications for this visit.         Care Coordination Start Date: 9/14/2021   Frequency of Care Coordination: monthly     Form Last Updated: 09/25/2023

## 2023-09-27 ENCOUNTER — TRANSFERRED RECORDS (OUTPATIENT)
Dept: HEALTH INFORMATION MANAGEMENT | Facility: CLINIC | Age: 68
End: 2023-09-27
Payer: COMMERCIAL

## 2023-10-31 ENCOUNTER — PATIENT OUTREACH (OUTPATIENT)
Dept: CARE COORDINATION | Facility: CLINIC | Age: 68
End: 2023-10-31
Payer: COMMERCIAL

## 2023-10-31 ASSESSMENT — ACTIVITIES OF DAILY LIVING (ADL): DEPENDENT_IADLS:: INDEPENDENT

## 2023-10-31 NOTE — PROGRESS NOTES
Clinic Care Coordination Contact  [facility]/Voicemail    Clinical Data: Care Coordinator Outreach    Outreach Documentation Number of Outreach Attempt   10/31/2023  11:47 AM 2       Left message on patient's voicemail with call back information and requested return call.    Plan: Care Coordinator will try to reach patient again in 1 month.     Priya Wheat RN, BSN, PHN  Primary Care / Care Coordinator   [facility]  [email or web address]   [phone]

## 2023-11-28 DIAGNOSIS — E03.9 HYPOTHYROIDISM, UNSPECIFIED TYPE: ICD-10-CM

## 2023-11-28 RX ORDER — LEVOTHYROXINE SODIUM 100 UG/1
TABLET ORAL
Qty: 90 TABLET | Refills: 0 | Status: SHIPPED | OUTPATIENT
Start: 2023-11-28 | End: 2024-02-26

## 2023-11-29 ENCOUNTER — MYC REFILL (OUTPATIENT)
Dept: INTERNAL MEDICINE | Facility: CLINIC | Age: 68
End: 2023-11-29
Payer: COMMERCIAL

## 2023-11-29 DIAGNOSIS — E03.9 HYPOTHYROIDISM, UNSPECIFIED TYPE: ICD-10-CM

## 2023-11-29 RX ORDER — LEVOTHYROXINE SODIUM 100 UG/1
TABLET ORAL
Qty: 90 TABLET | Refills: 0 | OUTPATIENT
Start: 2023-11-29

## 2023-12-15 ENCOUNTER — TELEPHONE (OUTPATIENT)
Dept: INTERNAL MEDICINE | Facility: CLINIC | Age: 68
End: 2023-12-15

## 2023-12-15 ENCOUNTER — IMMUNIZATION (OUTPATIENT)
Dept: FAMILY MEDICINE | Facility: CLINIC | Age: 68
End: 2023-12-15
Payer: COMMERCIAL

## 2023-12-15 DIAGNOSIS — Z23 NEED FOR PROPHYLACTIC VACCINATION AND INOCULATION AGAINST INFLUENZA: Primary | ICD-10-CM

## 2023-12-15 PROCEDURE — 90662 IIV NO PRSV INCREASED AG IM: CPT

## 2023-12-15 PROCEDURE — G0008 ADMIN INFLUENZA VIRUS VAC: HCPCS

## 2023-12-15 PROCEDURE — 99207 PR NO CHARGE NURSE ONLY: CPT

## 2023-12-15 NOTE — TELEPHONE ENCOUNTER
Pt was scheduled for a flu shot at 9 and changed it to 3:30.  She is waiting to hear from Dr. Patel her rheumatologist to see if it is ok to get her flu shot.      This is a heads up.      Advised just let us know if she needs to cancel or she can cancel it on her mychart.

## 2023-12-19 ENCOUNTER — PATIENT OUTREACH (OUTPATIENT)
Dept: NURSING | Facility: CLINIC | Age: 68
End: 2023-12-19
Payer: COMMERCIAL

## 2023-12-19 ASSESSMENT — ACTIVITIES OF DAILY LIVING (ADL): DEPENDENT_IADLS:: INDEPENDENT

## 2023-12-19 NOTE — PROGRESS NOTES
"Clinic Care Coordination Contact  Follow Up Progress Note      Assessment:   Patient states she has \"vision issues\" that she is working on with her Ophthalmologist and is hopeful, her sight will get better.    Patient is still working on tobacco cessation; patient works out with \"the old ladies\" and enjoying her time.  Patient is thinking about tobacco cessation for a new years resolution.    Patient is enjoying [facility] and her  is out of town \"hunting\" and patient is enjoying her time alone.  Patient has no other questions or concerns at this time.    Care Gaps:    Health Maintenance Due   Topic Date Due    COPD ACTION PLAN  Never done    RSV VACCINE (Pregnancy & 60+) (1 - 1-dose 60+ series) Never done    ZOSTER IMMUNIZATION (2 of 2) 08/03/2021    DEXA  04/26/2022    COVID-19 Vaccine (5 - 2023-24 season) 09/01/2023     Care Gaps Last addressed on 12/18/2023    Care Plans  Care Plan: Complete health maintenance and care gaps       Problem: Lifestyle choices       Goal: Medical       Start Date: 9/14/2021 Expected End Date: 3/19/2024    This Visit's Progress: 70% Recent Progress: 70%    Note:     Goal Statement: I will complete my recommended overdue health maintenance/care gaps.      Strengths: Strong advocate for self  Patient expressed understanding of goal: Yes  Action steps to achieve this goal:  I will try to quit smoking; on 9/10/2021 \"cold turkey\" x 2 weeks; Started back smoking 10/2021  I will follow up with my providers as scheduled/recommended (Follow up MRI/MRCP TBD Due 3/2024)  I will take my medications as prescribed  4.   I will discuss, review, schedule and complete recommended overdue health maintenance with my primary care provider  5.   I will contact my care team with questions, concerns, support needs  6.   I will use the clinic as a resource and I understand I can contact my clinic with 24/7 after hours services available                            Care Plan: Advance Care Plan       " Problem: Patient does not have a valid Health Care Directive       Goal: Complete Health Care Directive       Start Date: 5/24/2023 Expected End Date: 3/19/2024    This Visit's Progress: 10% Recent Progress: 10%    Note:     Goal Statement: I will complete a Health Care Directive and have this scanned into my Medical Record.  Barriers: None  Strengths: Strong advocate for self  Patient expressed understanding of goal: yes  Action steps to achieve this goal:  Care Coordination will mail me a Health Care Directive and any requested resources (goals worksheets, education sheets, class flyer).   I will complete the Health Care Directive. My signature must be either notarized or witnessed by two adults who are not named as health care agents in the document. Additionally only one of the witnesses can be employed by my health care system. If I need a notary I can check with my bank, my place of [employer], [employer] stores, or look online at [email or web address] .  I will provide a copy of my Health Care Directive to my care team and/or email directly to Health [email or web address].   I can visit [email or web address] website, email [email or web address] or call [facility] vitaMedMD at [phone] (M-Friday 6a to 5p) for more information including free classes on completing a Health Care Directive.  I will contact my care team with any barriers, questions or assistance needed with this goal. Care coordinator will remain available as needed.                             Intervention/Education provided during outreach:   RNCC called and spoke with patient/caregiver; introduced self, discussed role of Care Coordination and explained reason for call    Plan:   -Patient will contact the care team with questions, concerns, support needs   -Patient will use the clinic as a resource and understands (s)he can contact the [facility] clinic with 24/7 after hours services available  -Care Coordinator will remain available as  needed  -RNCC will follow up in one month for follow up appointments/recommendations and goal progression     Priya Wheat RN, BSN, PHN  Primary Care / Care Coordinator   [facility]  [email or web address]   [phone]

## 2023-12-19 NOTE — LETTER
[facility]  Patient Centered Plan of Care  About Me:        Patient Name:  Irene Haile    YOB: 1955  Age:         68 year old   Edy MRN:    [id number] Telephone Information:  Home Phone [phone]   Mobile [phone]       Address:  [address] [place] [address] Email address:  [email or web address]      Emergency Contact(s)    Name Relationship Lgl Grd Work Phone Home Phone Mobile Phone   SRINATH ESCOBEDO Spouse  [phone]           Primary language:  English     needed? No   [place] [employer]:  [phone] op. 1  Other communication barriers:None    Preferred Method of Communication:  Vazquez  Current living arrangement: I live in a private home with spouse (Srinath, )    Mobility Status/ Medical Equipment: Independent    Health Maintenance  Health Maintenance Reviewed: Due/Overdue   Health Maintenance Due   Topic Date Due    COPD ACTION PLAN  Never done    RSV VACCINE (Pregnancy & 60+) (1 - 1-dose 60+ series) Never done    ZOSTER IMMUNIZATION (2 of 2) 08/03/2021    DEXA  04/26/2022    COVID-19 Vaccine (5 - 2023-24 season) 09/01/2023           My Access Plan  Medical Emergency 911   Primary Clinic Line [facility] - [phone]   24 Hour Appointment Line [phone] or  [phone]) (toll-free)   24 Hour Nurse Line [phone] (toll-free)   Preferred Urgent Care [facility], [phone]     Preferred Hospital [facility]  [phone]     Preferred Pharmacy [id number] - [place] - [address] AT [place] [address] & [facility]     Behavioral Health Crisis Line The National Suicide Prevention Lifeline at 1-367.569.2943 or Text/Call 993     My Care Team Members  Patient Care Team         Relationship Specialty Notifications Start End    Veum, Leonardo KULKARNI MD PCP - General Internal Medicine   "7/17/10     Phone: [phone] Fax: [phone]         [address] [place] [address]    Leonardo Angela MD Assigned PCP   2/14/21     Phone: [phone] Fax: [phone]         [address] [place] [address]    Priya Wheat, RN Lead Care Coordinator  Admissions 9/14/21     Shikha Riley MD MD Pulmonary Disease  9/29/21     Phone: [phone] Fax: [phone]         [address] [facility] [address] [facility] [address]    Shikha Riley MD Assigned Pulmonology Provider   12/19/21     Phone: [phone] Fax: [phone]         [address] [facility] [address] [facility] [address]    Polo Moreau DPM Assigned Musculoskeletal Provider   8/12/23     Phone: [phone] Fax: [phone]         [address] [place] [address] [address] [facility] [address]                My Care Plans  Self Management and Treatment Plan    Care Plan  Care Plan: Complete health maintenance and care gaps       Problem: Lifestyle choices       Goal: Medical       Start Date: 9/14/2021 Expected End Date: 3/19/2024    This Visit's Progress: 70% Recent Progress: 70%    Note:     Goal Statement: I will complete my recommended overdue health maintenance/care gaps.      Strengths: Strong advocate for self  Patient expressed understanding of goal: Yes  Action steps to achieve this goal:  I will try to quit smoking; on 9/10/2021 \"cold turkey\" x 2 weeks; Started back smoking 10/2021  I will follow up with my providers as scheduled/recommended (Follow up MRI/MRCP TBD Due 3/2024)  I will take my medications as prescribed  4.   I will discuss, review, schedule and complete recommended overdue health maintenance with my primary care provider  5.   I will contact my care team with questions, concerns, support needs  6.   I will use the clinic as a resource and I understand I can contact my clinic with 24/7 after hours services available                            Care Plan: Advance Care Plan       Problem: Patient does not have a valid " Health Care Directive       Goal: Complete Health Care Directive       Start Date: 5/24/2023 Expected End Date: 3/19/2024    This Visit's Progress: 10% Recent Progress: 10%    Note:     Goal Statement: I will complete a Health Care Directive and have this scanned into my Medical Record.  Barriers: None  Strengths: Strong advocate for self  Patient expressed understanding of goal: yes  Action steps to achieve this goal:  Care Coordination will mail me a Health Care Directive and any requested resources (goals worksheets, education sheets, class flyer).   I will complete the Health Care Directive. My signature must be either notarized or witnessed by two adults who are not named as health care agents in the document. Additionally only one of the witnesses can be employed by my health care system. If I need a notary I can check with my bank, my place of [employer], [employer] stores, or look online at [email or web address] .  I will provide a copy of my Health Care Directive to my care team and/or email directly to [email or web address].   I can visit [email or web address] website, email [email or web address] or call [facility] at [phone] (M-Friday 6a to 5p) for more information including free classes on completing a Health Care Directive.  I will contact my care team with any barriers, questions or assistance needed with this goal. Care coordinator will remain available as needed.                             Action Plans on File:     Advance Care Plans/Directives:   Advanced Care Plan/Directives on file:   No    Discussed with patient/caregiver(s): No data recorded           My Medical and Care Information  Problem List   Patient Active Problem List   Diagnosis    Tobacco use disorder    ACP (advance care planning)    COPD (chronic obstructive pulmonary disease) (H)    Hypothyroidism, unspecified type    Hyperlipidemia LDL goal <100    Pancreatic cyst    Positive OCTAVIANO (antinuclear  antibody)      Current Medications and Allergies:    Allergies   Allergen Reactions    Amoxicillin-Pot Clavulanate       Vomiting/nausea    Atorvastatin      Muscle achiness      Augmented Betamethasone Diprop [Betamethasone] GI Disturbance    Codeine      Insomnia      Nitrofurantoin      Nausea, loss appetite    Shingrix [Zoster Vac Recomb Adjuvanted]      Severe body pain for 3-4 weeks     Current Outpatient Medications   Medication    albuterol (PROAIR HFA/PROVENTIL HFA/VENTOLIN HFA) 108 (90 Base) MCG/ACT inhaler    ANORO ELLIPTA 62.5-25 MCG/ACT oral inhaler    levothyroxine (SYNTHROID/LEVOTHROID) 100 MCG tablet    rosuvastatin (CRESTOR) 10 MG tablet     No current facility-administered medications for this visit.         Care Coordination Start Date: 9/14/2021   Frequency of Care Coordination: monthly, more frequently as needed     Form Last Updated: 12/19/2023

## 2024-01-02 NOTE — TELEPHONE ENCOUNTER
Provider E-Visit time total (minutes): 5 min.   Previously spoke with pt at time of Evisit request but documentation was not completed at that time. Sx at that time were felt to represent viral issues and meds not prescribed at that time but instructions given for pt to be seen in clinic if sx did not resolved over next 112 weeks or worsened prior. Pt subsequently seen in clinic 4/1/4/23. See clinic visit note in chart. No e visit billing submitted since only brief advice at time of original encounter

## 2024-01-16 ENCOUNTER — TRANSFERRED RECORDS (OUTPATIENT)
Dept: HEALTH INFORMATION MANAGEMENT | Facility: CLINIC | Age: 69
End: 2024-01-16
Payer: COMMERCIAL

## 2024-01-16 LAB
ALT SERPL-CCNC: 38 IU/L (ref 5–35)
AST SERPL-CCNC: 38 U/L (ref 5–34)
CREATININE (EXTERNAL): 0.59 MG/DL (ref 0.5–1.3)
GFR ESTIMATED (EXTERNAL): 108.1 ML/MIN/1.73M2

## 2024-01-31 ENCOUNTER — TRANSFERRED RECORDS (OUTPATIENT)
Dept: HEALTH INFORMATION MANAGEMENT | Facility: CLINIC | Age: 69
End: 2024-01-31
Payer: COMMERCIAL

## 2024-02-02 ENCOUNTER — PATIENT OUTREACH (OUTPATIENT)
Dept: CARE COORDINATION | Facility: CLINIC | Age: 69
End: 2024-02-02
Payer: COMMERCIAL

## 2024-02-19 ENCOUNTER — PATIENT OUTREACH (OUTPATIENT)
Dept: CARE COORDINATION | Facility: CLINIC | Age: 69
End: 2024-02-19
Payer: COMMERCIAL

## 2024-02-19 ASSESSMENT — ACTIVITIES OF DAILY LIVING (ADL): DEPENDENT_IADLS:: INDEPENDENT

## 2024-02-19 NOTE — PROGRESS NOTES
Clinic Care Coordination Contact  [facility]/Voicemail    Clinical Data: Care Coordinator Outreach    Outreach Documentation Number of Outreach Attempt   2/19/2024   1:52 PM 1     Left message on patient's voicemail with call back information and requested return call.    Plan: Care Coordinator will try to reach patient again in 2 months     Priya Wheat RN, BSN, PHN  Primary Care / Care Coordinator   [facility]  [email or web address]   [phone]

## 2024-02-25 DIAGNOSIS — E03.9 HYPOTHYROIDISM, UNSPECIFIED TYPE: ICD-10-CM

## 2024-02-26 RX ORDER — LEVOTHYROXINE SODIUM 100 UG/1
TABLET ORAL
Qty: 90 TABLET | Refills: 0 | Status: SHIPPED | OUTPATIENT
Start: 2024-02-26 | End: 2024-05-20

## 2024-03-22 ENCOUNTER — ANCILLARY PROCEDURE (OUTPATIENT)
Dept: MAMMOGRAPHY | Facility: CLINIC | Age: 69
End: 2024-03-22
Attending: INTERNAL MEDICINE
Payer: COMMERCIAL

## 2024-03-22 DIAGNOSIS — Z12.31 VISIT FOR SCREENING MAMMOGRAM: ICD-10-CM

## 2024-03-22 PROCEDURE — 77063 BREAST TOMOSYNTHESIS BI: CPT | Mod: TC | Performed by: RADIOLOGY

## 2024-03-22 PROCEDURE — 77067 SCR MAMMO BI INCL CAD: CPT | Mod: TC | Performed by: RADIOLOGY

## 2024-04-04 ENCOUNTER — E-VISIT (OUTPATIENT)
Dept: INTERNAL MEDICINE | Facility: CLINIC | Age: 69
End: 2024-04-04
Payer: COMMERCIAL

## 2024-04-04 DIAGNOSIS — J06.9 UPPER RESPIRATORY TRACT INFECTION, UNSPECIFIED TYPE: Primary | ICD-10-CM

## 2024-04-04 PROCEDURE — 99207 PR NON-BILLABLE SERV PER CHARTING: CPT | Performed by: INTERNAL MEDICINE

## 2024-04-08 ENCOUNTER — PATIENT OUTREACH (OUTPATIENT)
Dept: NURSING | Facility: CLINIC | Age: 69
End: 2024-04-08
Payer: COMMERCIAL

## 2024-04-08 ASSESSMENT — ACTIVITIES OF DAILY LIVING (ADL): DEPENDENT_IADLS:: INDEPENDENT

## 2024-04-08 NOTE — LETTER
[facility]  Patient Centered Plan of Care  About Me:        Patient Name:  Irene Haile    YOB: 1955  Age:         68 year old   [place] MRN:    [id number] Telephone Information:  Home Phone [phone]   Mobile [phone]       Address:  [address] [place] [address] Email address:  [email or web address]      Emergency Contact(s)    Name Relationship Lgl Grd Work Phone Home Phone Mobile Phone   1. SRINATH HAILE Spouse  [phone]           Primary language:  English     needed? No   [facility] [employer]:  [phone] op. 1  Other communication barriers:None    Preferred Method of Communication:  Vazquez  Current living arrangement: I live in a private home with spouse (Srinath, )    Mobility Status/ Medical Equipment: Independent    Health Maintenance  Health Maintenance Reviewed: Due/Overdue   Health Maintenance Due   Topic Date Due    COPD ACTION PLAN  Never done    RSV VACCINE (Pregnancy & 60+) (1 - 1-dose 60+ series) Never done    ZOSTER IMMUNIZATION (2 of 2) 08/03/2021    DEXA  04/26/2022    COVID-19 Vaccine (5 - 2023-24 season) 09/01/2023    PHQ-2 (once per calendar year)  01/01/2024    TSH W/FREE T4 REFLEX  04/13/2024    NICOTINE/TOBACCO CESSATION COUNSELING Q 1 YR  05/02/2024    MEDICARE ANNUAL WELLNESS VISIT  05/02/2024           My Access Plan  Medical Emergency 911   Primary Clinic Line [facility]* - [phone]   24 Hour Appointment Line [phone] or  [phone]) (toll-free)   24 Hour Nurse Line [phone] (toll-free)   Preferred Urgent Care [facility], [phone]     Preferred Hospital [facility]  [phone]     Preferred Pharmacy [id number] - [place], [place] - [address] AT [place] [address] & [facility]     Behavioral Health Crisis Line The National Suicide Prevention Lifeline at  "0-675-577-3739 or Text/Call 988     My Care Team Members  Patient Care Team         Relationship Specialty Notifications Start End    Leonardo Angela MD PCP - General Internal Medicine  7/17/10     Phone: [phone] Fax: [phone]         [address] [place] [address]    Leonardo Angela MD Assigned PCP   2/14/21     Phone: [phone] Fax: [phone]         [address] [place] [address]    Priya Wheat, RN Lead Care Coordinator  Admissions 9/14/21     Shikha Riley MD MD Pulmonary Disease  9/29/21     Phone: [phone] Fax: [phone]         [address] [facility] [address] [facility] [address]    Polo Moreau DPM Assigned Musculoskeletal Provider   8/12/23     Phone: [phone] Fax: [phone] [address] [facility] [address] [facility] [address]                My Care Plans  Self Management and Treatment Plan    Care Plan  Care Plan: Complete health maintenance and care gaps       Problem: Lifestyle choices       Goal: Medical       Start Date: 9/14/2021 Expected End Date: 7/8/2024    This Visit's Progress: 70% Recent Progress: 70%    Note:     Goal Statement: I will complete my recommended overdue health maintenance/care gaps.      Strengths: Strong advocate for self  Patient expressed understanding of goal: Yes  Action steps to achieve this goal:  I will try to quit smoking; on 9/10/2021 \"cold turkey\" x 2 weeks; Started back smoking 10/2021  I will follow up with my providers as scheduled/recommended (Follow up MRI/MRCP TBD Due 3/2024)  I will take my medications as prescribed  4.   I will discuss, review, schedule and complete recommended overdue health maintenance with my primary care provider  5.   I will contact my care team with questions, concerns, support needs  6.   I will use the clinic as a resource and I understand I can contact my clinic with 24/7 after hours services available                            Care Plan: Advance Care Plan       Problem: Patient " does not have a valid Health Care Directive       Goal: Complete Health Care Directive       Start Date: 5/24/2023 Expected End Date: 7/8/2024    This Visit's Progress: 10% Recent Progress: 10%    Note:     Goal Statement: I will complete a Health Care Directive and have this scanned into my Medical Record.  Barriers: None  Strengths: Strong advocate for self  Patient expressed understanding of goal: yes  Action steps to achieve this goal:  Care Coordination will mail me a Health Care Directive and any requested resources (goals worksheets, education sheets, class flyer).   I will complete the Health Care Directive. My signature must be either notarized or witnessed by two adults who are not named as health care agents in the document. Additionally only one of the witnesses can be employed by my health care system. If I need a notary I can check with my bank, my place of [employer], [employer] stores, or look online at www."[employer]" .  I will provide a copy of my Health Care Directive to my care team and/or email directly to [email or web address].   I can visit [email or web address] website, email [email or web address] or call [facility] [employer] at [phone] (M-Friday 6a to 5p) for more information including free classes on completing a Health Care Directive.  I will contact my care team with any barriers, questions or assistance needed with this goal. Care coordinator will remain available as needed.                             Action Plans on File:     Advance Care Plans/Directives:   Advanced Care Plan/Directives on file:   No    Discussed with patient/caregiver(s): No data recorded           My Medical and Care Information  Problem List   Patient Active Problem List   Diagnosis    Tobacco use disorder    ACP (advance care planning)    COPD (chronic obstructive pulmonary disease) (H)    Hypothyroidism, unspecified type    Hyperlipidemia LDL goal <100    Pancreatic cyst    Positive  OCTAVIANO (antinuclear antibody)      Current Medications and Allergies:    Allergies   Allergen Reactions    Amoxicillin-Pot Clavulanate       Vomiting/nausea    Atorvastatin      Muscle achiness      Augmented Betamethasone Diprop [Betamethasone] GI Disturbance    Codeine      Insomnia      Nitrofurantoin      Nausea, loss appetite    Shingrix [Zoster Vac Recomb Adjuvanted]      Severe body pain for 3-4 weeks     Current Outpatient Medications   Medication Sig Dispense Refill    albuterol (PROAIR HFA/PROVENTIL HFA/VENTOLIN HFA) 108 (90 Base) MCG/ACT inhaler Inhale 2 puffs into the lungs every 6 hours as needed for shortness of breath, wheezing or cough 18 g 11    ANORO ELLIPTA 62.5-25 MCG/ACT oral inhaler INHALE 1 PUFF INTO THE LUNGS DAILY 60 each 9    levothyroxine (SYNTHROID/LEVOTHROID) 100 MCG tablet TAKE 1 TABLET(100 MCG) BY MOUTH DAILY 90 tablet 0    rosuvastatin (CRESTOR) 10 MG tablet TAKE 1 TABLET(10 MG) BY MOUTH DAILY 90 tablet 2     No current facility-administered medications for this visit.         Care Coordination Start Date: 9/14/2021   Frequency of Care Coordination: 2 months, more frequently as needed     Form Last Updated: 04/08/2024

## 2024-04-08 NOTE — PROGRESS NOTES
"Clinic Care Coordination Contact  Follow Up Progress Note      Assessment:   Patient states she scheduled an eVisit with Primary Care Provider on Friday, 4/5/2024 and has \"not heard from anyone\".  Per patient, it states \"in process\".  Patient c/o:    -cold that started before Easter  -persistent, productive cough (\"green mucus\") that keeps her up at night  -patient has not been able to sleep  -patient is taking inhalers at prescribed  -patient states she has \"specks of blood\" when blowing \"my nose\"  -patient has \"cut way back\" on smoking  -takes Methotrexate 5 (2.5 mg) tablets once a week (Saturdays) as prescribed by \"Dr. Patel\", Rheumatologist \"for my eyes\"  -on 4/3/2024, the patient had an \"eye injection\"  -patient started taking OTC/Robitussin Honey \"last night\"    Patient is requesting a \"cough suppressant\" to help her sleep at night. Patient states she doesn't feel the s/s warrant an ER/URGENT CARE visit and that she \"tried\" to speak with Primary Care Provider.  RNCC suggested to schedule an in person appointment with Primary Care Provider (auscultate lungs) and patient requests RNCC to relay the message to Primary Care Provider for direction and possible prescription for cough suppressant.    RNCC will reach out to Primary Care Provider/team for direction moving forward.    Care Gaps:    Health Maintenance Due   Topic Date Due    COPD ACTION PLAN  Never done    RSV VACCINE (Pregnancy & 60+) (1 - 1-dose 60+ series) Never done    ZOSTER IMMUNIZATION (2 of 2) 08/03/2021    DEXA  04/26/2022    COVID-19 Vaccine (5 - 2023-24 season) 09/01/2023    PHQ-2 (once per calendar year)  01/01/2024    TSH W/FREE T4 REFLEX  04/13/2024    NICOTINE/TOBACCO CESSATION COUNSELING Q 1 YR  05/02/2024    MEDICARE ANNUAL WELLNESS VISIT  05/02/2024     Care Gaps Last addressed on 4/8/2024    Care Plans  Care Plan: Complete health maintenance and care gaps       Problem: Lifestyle choices       Goal: Medical       Start Date: 9/14/2021 " "Expected End Date: 7/8/2024    This Visit's Progress: 70% Recent Progress: 70%    Note:     Goal Statement: I will complete my recommended overdue health maintenance/care gaps.      Strengths: Strong advocate for self  Patient expressed understanding of goal: Yes  Action steps to achieve this goal:  I will try to quit smoking; on 9/10/2021 \"cold turkey\" x 2 weeks; Started back smoking 10/2021  I will follow up with my providers as scheduled/recommended (Follow up MRI/MRCP TBD Due 3/2024)  I will take my medications as prescribed  4.   I will discuss, review, schedule and complete recommended overdue health maintenance with my primary care provider  5.   I will contact my care team with questions, concerns, support needs  6.   I will use the clinic as a resource and I understand I can contact my clinic with 24/7 after hours services available                            Care Plan: Advance Care Plan       Problem: Patient does not have a valid Health Care Directive       Goal: Complete Health Care Directive       Start Date: 5/24/2023 Expected End Date: 7/8/2024    This Visit's Progress: 10% Recent Progress: 10%    Note:     Goal Statement: I will complete a Health Care Directive and have this scanned into my Medical Record.  Barriers: None  Strengths: Strong advocate for self  Patient expressed understanding of goal: yes  Action steps to achieve this goal:  Care Coordination will mail me a Health Care Directive and any requested resources (goals worksheets, education sheets, class flyer).   I will complete the Health Care Directive. My signature must be either notarized or witnessed by two adults who are not named as health care agents in the document. Additionally only one of the witnesses can be employed by my health care system. If I need a notary I can check with my bank, my place of [employer], [employer] stores, or look online at [email or web address] .  I will provide a copy of my Health Care Directive to my care team and/or " email directly to [email or web address].   I can visit [email or web address] website, email [email or web address] or call [facility] at [phone] (M-Friday 6a to 5p) for more information including free classes on completing a Health Care Directive.  I will contact my care team with any barriers, questions or assistance needed with this goal. Care coordinator will remain available as needed.                             Intervention/Education provided during outreach:   RNCC called and spoke with patient/caregiver; introduced self, discussed role of Care Coordination and explained reason for call    Plan:   -Patient will contact the care team with questions, concerns, support needs   -Patient will use the clinic as a resource and understands (s)he can contact the [facility] with 24/7 after hours services available  -Care Coordinator will remain available as needed  -RNCC will follow up in two months for follow up appointments/recommendations and goal progression     Priya Wheat RN, BSN, PHN  Primary Care / Care Coordinator   [facility]  [email or web address] [email or web address]   [phone]

## 2024-04-10 NOTE — TELEPHONE ENCOUNTER
Provider E-Visit time total (minutes): 5 min    Tried to call pt 4/9/24 but  NA.  Apologized for missing earlier Evisit request. LM that I will try calling again tomorrow to discuss sx.      Spoke with pt 4/10/24. Sx had resolved except for rare dry cough. No shortness of breath, F/C. Had restarted Anoro No intervention needed.  No billing submitted

## 2024-04-10 NOTE — TELEPHONE ENCOUNTER
Spoke with pt today. URI sx for a few days initially. No covid testing done. Pt denies current F/C or SOB. Cough much better and rare and dry now. Eating normally. Probable resolving viral issue. Back using Anoro also as previous. No new intervention needed

## 2024-04-15 DIAGNOSIS — J44.9 CHRONIC OBSTRUCTIVE PULMONARY DISEASE, UNSPECIFIED COPD TYPE (H): ICD-10-CM

## 2024-04-16 RX ORDER — UMECLIDINIUM BROMIDE AND VILANTEROL TRIFENATATE 62.5; 25 UG/1; UG/1
1 POWDER RESPIRATORY (INHALATION) DAILY
Qty: 60 EACH | Refills: 2 | Status: SHIPPED | OUTPATIENT
Start: 2024-04-16 | End: 2024-05-04

## 2024-04-24 DIAGNOSIS — E78.5 HYPERLIPIDEMIA LDL GOAL <100: ICD-10-CM

## 2024-04-24 RX ORDER — ROSUVASTATIN CALCIUM 10 MG/1
10 TABLET, COATED ORAL DAILY
Qty: 90 TABLET | OUTPATIENT
Start: 2024-04-24

## 2024-04-24 RX ORDER — ROSUVASTATIN CALCIUM 10 MG/1
10 TABLET, COATED ORAL DAILY
Qty: 30 TABLET | Refills: 0 | Status: SHIPPED | OUTPATIENT
Start: 2024-04-24 | End: 2024-05-28

## 2024-04-24 NOTE — TELEPHONE ENCOUNTER
Patient is scheduled for an appointment on 5/3/24: further refills to be discussed during this upcoming appointment.     Rabia Chiang RN

## 2024-04-27 SDOH — HEALTH STABILITY: PHYSICAL HEALTH: ON AVERAGE, HOW MANY MINUTES DO YOU ENGAGE IN EXERCISE AT THIS LEVEL?: 60 MIN

## 2024-04-27 SDOH — HEALTH STABILITY: PHYSICAL HEALTH: ON AVERAGE, HOW MANY DAYS PER WEEK DO YOU ENGAGE IN MODERATE TO STRENUOUS EXERCISE (LIKE A BRISK WALK)?: 2 DAYS

## 2024-04-27 ASSESSMENT — SOCIAL DETERMINANTS OF HEALTH (SDOH): HOW OFTEN DO YOU GET TOGETHER WITH FRIENDS OR RELATIVES?: TWICE A WEEK

## 2024-04-28 DIAGNOSIS — E78.5 HYPERLIPIDEMIA LDL GOAL <100: ICD-10-CM

## 2024-04-29 RX ORDER — ROSUVASTATIN CALCIUM 10 MG/1
10 TABLET, COATED ORAL DAILY
Qty: 90 TABLET | OUTPATIENT
Start: 2024-04-29

## 2024-05-03 ENCOUNTER — OFFICE VISIT (OUTPATIENT)
Dept: INTERNAL MEDICINE | Facility: CLINIC | Age: 69
End: 2024-05-03
Payer: COMMERCIAL

## 2024-05-03 DIAGNOSIS — Z00.00 MEDICARE ANNUAL WELLNESS VISIT, SUBSEQUENT: Primary | ICD-10-CM

## 2024-05-03 DIAGNOSIS — J44.9 CHRONIC OBSTRUCTIVE PULMONARY DISEASE, UNSPECIFIED COPD TYPE (H): ICD-10-CM

## 2024-05-03 DIAGNOSIS — H20.9 UVEITIS: ICD-10-CM

## 2024-05-03 DIAGNOSIS — R05.9 COUGH, UNSPECIFIED TYPE: ICD-10-CM

## 2024-05-03 DIAGNOSIS — R91.8 PULMONARY NODULES: ICD-10-CM

## 2024-05-03 DIAGNOSIS — Z78.0 ASYMPTOMATIC MENOPAUSAL STATE: ICD-10-CM

## 2024-05-03 DIAGNOSIS — E03.9 HYPOTHYROIDISM, UNSPECIFIED TYPE: ICD-10-CM

## 2024-05-03 DIAGNOSIS — Z12.31 ENCOUNTER FOR SCREENING MAMMOGRAM FOR BREAST CANCER: ICD-10-CM

## 2024-05-03 DIAGNOSIS — E78.5 HYPERLIPIDEMIA LDL GOAL <100: ICD-10-CM

## 2024-05-03 DIAGNOSIS — F17.200 TOBACCO USE DISORDER: ICD-10-CM

## 2024-05-03 DIAGNOSIS — K86.2 PANCREATIC CYST: ICD-10-CM

## 2024-05-03 LAB
ERYTHROCYTE [DISTWIDTH] IN BLOOD BY AUTOMATED COUNT: 13.8 % (ref 10–15)
HCT VFR BLD AUTO: 44.3 % (ref 35–47)
HGB BLD-MCNC: 14.6 G/DL (ref 11.7–15.7)
MCH RBC QN AUTO: 29.6 PG (ref 26.5–33)
MCHC RBC AUTO-ENTMCNC: 33 G/DL (ref 31.5–36.5)
MCV RBC AUTO: 90 FL (ref 78–100)
PLATELET # BLD AUTO: 249 10E3/UL (ref 150–450)
RBC # BLD AUTO: 4.94 10E6/UL (ref 3.8–5.2)
WBC # BLD AUTO: 9.5 10E3/UL (ref 4–11)

## 2024-05-03 PROCEDURE — G0438 PPPS, INITIAL VISIT: HCPCS | Performed by: INTERNAL MEDICINE

## 2024-05-03 PROCEDURE — 85027 COMPLETE CBC AUTOMATED: CPT | Performed by: INTERNAL MEDICINE

## 2024-05-03 PROCEDURE — 99214 OFFICE O/P EST MOD 30 MIN: CPT | Mod: 25 | Performed by: INTERNAL MEDICINE

## 2024-05-03 PROCEDURE — 36415 COLL VENOUS BLD VENIPUNCTURE: CPT | Performed by: INTERNAL MEDICINE

## 2024-05-03 PROCEDURE — 80053 COMPREHEN METABOLIC PANEL: CPT | Performed by: INTERNAL MEDICINE

## 2024-05-03 PROCEDURE — 84443 ASSAY THYROID STIM HORMONE: CPT | Performed by: INTERNAL MEDICINE

## 2024-05-03 PROCEDURE — 80061 LIPID PANEL: CPT | Performed by: INTERNAL MEDICINE

## 2024-05-03 RX ORDER — METHOTREXATE 2.5 MG/1
15 TABLET ORAL
COMMUNITY

## 2024-05-03 NOTE — PROGRESS NOTES
Preventive Care Visit  [facility]  Leonardo Angela MD, Internal Medicine  May 3, 2024      ASSESSMENT:    1. Medicare annual wellness visit, subsequent  Screening labs ordered.  See plan discussion below for other healthcare maintenance recommendations.  Patient has follow-up currently with [facility] scheduled for 6/10/2024.  Was intolerant of first Shingrix vaccination so not being repeated.  Patient declines COVID vaccination today.  Mild hearing loss but not to the point the patient wishes to consider hearing aid therapy  - Comprehensive metabolic panel; Future  - Comprehensive metabolic panel    2. Hypothyroidism, unspecified type  On levothyroxine.  Previous TSH at goal.  Labs ordered.  Continue current medication dosing pending lab result  - TSH WITH FREE T4 REFLEX; Future  - TSH WITH FREE T4 REFLEX     3. Chronic obstructive pulmonary disease, unspecified COPD type (H)  Stable breathing status.  Continue current medication  - CBC with platelets; Future  - CBC with platelets  - umeclidinium-vilanterol (ANORO ELLIPTA) 62.5-25 MCG/ACT oral inhaler; Inhale 1 puff into the lungs daily  Dispense: 120 each; Refill: 3    4. Hyperlipidemia LDL goal <100  On statin therapy.  Previous lipids at goal.  Due for lab follow-up.  Continue current medication dosage pending lab result  - Lipid panel reflex to direct LDL Fasting; Future  - Comprehensive metabolic panel; Future  - Lipid panel reflex to direct LDL Fasting  - Comprehensive metabolic panel    5. Pulmonary nodules  History of lung nodules.  Radiology recommending 1 year follow-up and now due  - CT Chest w/o Contrast; Future    6. Pancreatic cyst   Hx pancreatic foci, minimally changed previous imaging. Due for follow-up. If further enlargement, will refer to GI. No abd pain. Weight decreased with diet but appetite has been OK per pt  - MR Abdomen MRCP w/o & w Contrast; Future    7. Tobacco use disorder  Smoking 3  cigarettes/day still.  Counseled  regarding full cessation.  Patient declines at this time    8. Uveitis  Managed by rheumatology and ophthalmology. Stable sx. Pt states Rheum considering changing to different form of methotrexate. Asked her to have rheum send consult note to me with nxt visit    9. Encounter for screening mammogram for breast cancer  Asymptomatic.  Due for mammogram again 3/23/2025 or later  - *MA Screening Digital Bilateral; Future    10. Cough, unspecified type  Occasional cough at night only.  Not during the day.  Given smoking history, will have imaging of the chest as per order above.  Will also have patient undergo a 2-week course of omeprazole to rule out supine position GERD-related cough.  Counseled regarding caffeine reduction and cessation of cigarette use  - CT Chest w/o Contrast; Future  - omeprazole (PRILOSEC) 20 MG DR capsule; Take 1 capsule (20 mg) by mouth daily  Dispense: 30 capsule; Refill: 0    11. Asymptomatic menopausal state  Menopausal and also at risk for pregnancy loss with smoking history.  Due for DEXA  - DX Bone Density; Future    PLAN:  Continue current medications. Call pharmacy when refills needed  Omeprazole 20 mg capsule, 1 capsule daily approximately 30 minutes before supper for couple weeks to see if helps late-night cough symptoms. Prescription sent to your [facility] pharmacy  Send me an update [employer] message in 3 weeks regarding response  Labs today as ordered  I encourage you to stop all smoking.  Continue use of nicotine lozenges as needed.  If  willing to fully quit in the future and not able to do on your own,  contact the clinic if interested in prescription therapy (Chantix/Buproprion) or contact  [employer] toll-free number (6-459-QUIT-NOW) or through the internet  (quit[employer]SM[employer]) for  counseling  Mammogram  3/25/25 o later. This will be done at the [facility]. Call [phone] or use Supportie to schedule.   CT Chest for lung nodule history and MRI pancreas/abdomen  for history pancreas foci. Edy Schafer will call you to schedule. If you have not heard from their schedulers within 2 business days, then call [phone] (radiology scheduling)  I would recommend an updated version  covid and influenza/flu vaccinations in the Fall 2024. May get through a pharmacy  I would recommend RSV vaccination in the Fall 2024. Get at a pharmacy  Also consider current version covid vaccination though pharmacy given immunosuppression with use of methotrexate  Colonoscopy 6/10/24 as scheduled  Reduce caffeine intake to lessen risk of acid reflux that may be contributing to late night cough  Follow-up with rheumatology and ophthalmology regarding eye disorder.  Ask  them to send me a copy of future visits so information may be placed in your medical chart  Pt was informed regarding extra E&M billing for management of new or established medical issues not related to today's wellness/screening visit             Gayatri Norris is a 68 year old, presenting for the following:  Wellness Visit  And follow-up regarding multiple medical issues as above      Health Care Directive  Patient does not have a Health Care Directive or Living Will: Discussed advance care planning with patient; however, patient declined at this time.          4/27/2024   General Health   How would you rate your overall physical health? Good   Feel stress (tense, anxious, or unable to sleep) Only a little   (!) STRESS CONCERN      4/27/2024   Nutrition   Diet: Regular (no restrictions)         4/27/2024   Exercise   Days per week of moderate/strenous exercise 2 days   Average minutes spent exercising at this level 60 min   (!) EXERCISE CONCERN      4/27/2024   Social Factors   Frequency of gathering with friends or relatives Twice a week   Worry food won't last until get money to buy more No   Food not last or not have enough money for food? No   Do you have housing?  Yes   Are you worried about losing your housing? No    Lack of transportation? No   Unable to get utilities (heat,electricity)? No         2024   Fall Risk   Fallen 2 or more times in the past year? No   Trouble with walking or balance? No          2024   Activities of Daily Living- Home Safety   Needs help with the following daily activites None of the above   Safety concerns in the home None of the above         2024   Dental   Dentist two times every year? Yes         2024   Hearing Screening   Hearing concerns? (!) I NEED TO ASK PEOPLE TO SPEAK UP OR REPEAT THEMSELVES.         2024   Driving Risk Screening   Patient/family members have concerns about driving No         2024   General Alertness/Fatigue Screening   Have you been more tired than usual lately? No         2024   Urinary Incontinence Screening   Bothered by leaking urine in past 6 months No            Today's PHQ-2 Score:       5/3/2024     3:13 PM   PHQ-2 (  [employer])   Q1: Little interest or pleasure in doing things 0   Q2: Feeling down, depressed or hopeless 0   PHQ-2 Score 0   Q1: Little interest or pleasure in doing things Not at all   Q2: Feeling down, depressed or hopeless Not at all   PHQ-2 Score 0           2024   Substance Use   Alcohol more than 3/day or more than 7/wk Not Applicable   Do you have a current opioid prescription? No   How severe/bad is pain from 1 to 10? 0/10 (No Pain)   Do you use any other substances recreationally? (!) DECLINE     Social History     Tobacco Use    Smoking status: Some Days     Current packs/day: 0.00     Average packs/day: 0.8 packs/day for 30.0 years (22.5 ttl pk-yrs)     Types: Cigarettes     Start date: 9/10/1991     Last attempt to quit: 9/10/2021     Years since quittin.6    Smokeless tobacco: Never    Tobacco comments:     Very hard to quit since Covid in 2020   Vaping Use    Vaping status: Never Used   Substance Use Topics    Alcohol use: No    Drug use: No            3/22/2024   LAST FHS-7 RESULTS   1st  degree relative breast or ovarian cancer Yes   Any relative bilateral breast cancer No   Any male have breast cancer No   Any ONE woman have BOTH breast AND ovarian cancer Yes   Any woman with breast cancer before 50yrs Yes   2 or more relatives with breast AND/OR ovarian cancer No   2 or more relatives with breast AND/OR bowel cancer Yes      Mammogram Screening - Mammogram every 1-2 years updated in Health Maintenance based on mutual decision making    ASCVD Risk   The ASCVD Risk score (Terrell DK, et al., 2019) failed to calculate for the following reasons:    The valid total cholesterol range is 130 to 320 mg/dL     Sexual health reviewed and updated as needed this visit by Provider       Current providers sharing in care for this patient include:  Patient Care Team:  Leonardo Angela MD as PCP - General (Internal Medicine)  Leonardo Angela MD as Assigned PCP  Priya Wheat RN as Lead Care Coordinator  Shikha Riley MD as MD (Pulmonary Disease)  Polo Moreau DPM as Assigned Musculoskeletal Provider    The following health maintenance items are reviewed in Epic and correct as of today:  Health Maintenance   Topic Date Due    COPD ACTION PLAN  Never done    RSV VACCINE (Pregnancy & 60+) (1 - 1-dose 60+ series) Never done    ZOSTER IMMUNIZATION (2 of 2) 08/03/2021    DEXA  04/26/2022    ANNUAL REVIEW OF HM ORDERS  06/09/2022    COVID-19 Vaccine (5 - 2023-24 season) 09/01/2023    TSH W/FREE T4 REFLEX  04/13/2024    NICOTINE/TOBACCO CESSATION COUNSELING Q 1 YR  05/02/2024    MEDICARE ANNUAL WELLNESS VISIT  05/02/2024    LUNG CANCER SCREENING  06/15/2024    LIPID  08/29/2024    MAMMO SCREENING  03/22/2025    FALL RISK ASSESSMENT  05/03/2025    GLUCOSE  04/13/2026    COLORECTAL CANCER SCREENING  06/01/2026    DTAP/TDAP/TD IMMUNIZATION (2 - Td or Tdap) 03/08/2028    ADVANCE CARE PLANNING  05/29/2028    SPIROMETRY  Completed    HEPATITIS C SCREENING  Completed    PHQ-2 (once per calendar year)   "Completed    INFLUENZA VACCINE  Completed    Pneumococcal Vaccine: 65+ Years  Completed    IPV IMMUNIZATION  Aged Out    HPV IMMUNIZATION  Aged Out    MENINGITIS IMMUNIZATION  Aged Out    RSV MONOCLONAL ANTIBODY  Aged Out    PAP  Discontinued         Review of Systems  CONSTITUTIONAL: NEGATIVE for fever, chills. Weight down 14 pounds. Diet improved per pt  INTEGUMENTARY/SKIN: NEGATIVE for worrisome rashes, moles or lesions  EYES: POSITIVE for hx uveitis. Denies current eye pain. Stable acuity overall. On methotrexate. Followed by rheumatology and ophthalmology  ENT/MOUTH: NEGATIVE for ear, mouth and throat problems.  Occasional nasal congestion  RESP: NEGATIVE for significant cough or SOB with inhaler therapy.  Did gardening and other yardwork today Occasional nonproductive cough at night only when lying down.  Smoking 3 cigarettes/day and not motivated to quit smoking at this time.   History of lung nodules and due for follow-up CT chest  BREAST: NEGATIVE for masses, tenderness or discharge  CV: NEGATIVE for chest pain, palpitations or peripheral edema  GI: NEGATIVE for nausea, abdominal pain, heartburn, or change in bowel habits.  History small pancreatic foci and due for follow-up MRI pancreas.  History colon polyps and has follow-up colonoscopy scheduled 6/10/2024 with [facility]  : NEGATIVE for frequency, dysuria, or hematuria  MUSCULOSKELETAL: NEGATIVE for significant arthralgias or myalgia  NEURO: NEGATIVE for weakness, dizziness or paresthesias  ENDOCRINE: NEGATIVE for temperature intolerance.  On statin and thyroid management  HEME: NEGATIVE for bleeding problems  PSYCHIATRIC: NEGATIVE for changes in mood or affect     Objective    Exam  /68   Pulse 90   Temp 98.6  F (37  C) (Temporal)   Ht 1.702 m (5' 7\")   Wt 65.4 kg (144 lb 3.2 oz)   LMP  (LMP Unknown)   SpO2 97%   BMI 22.58 kg/m     Estimated body mass index is 22.58 kg/m  as calculated from the following:    Height as of this encounter: 1.702 " "alek (5' 7\").    Weight as of this encounter: 65.4 kg (144 lb 3.2 oz).    Physical Exam  General appearance -  alert, no distress  Skin - No rashes or lesions.  Head - normocephalic, atraumatic  Eyes - JAVI, EOMI, fundi exam with nondilated pupils negative.  Ears - External ears normal. Canals clear. TM's normal.  Nose/Sinuses - Nares normal. Septum midline. Mucosa normal. No drainage or sinus tenderness.  Oropharynx - No erythema, no adenopathy, no exudates.  Neck - Supple without adenopathy or thyromegaly. No bruits.  Lungs - Clear to auscultation without wheezes/rhonchi.  Heart - Regular rate and rhythm without murmurs, clicks, or gallops.  Nodes - No supraclavicular, axillary, or inguinal adenopathy palpable.  Breasts - deferred  Abdomen - Abdomen soft, non-tender. BS normal. No masses or hepatosplenomegaly palpable. No bruits.  Extremities -No cyanosis, clubbing or edema.    Musculoskeletal - Spine ROM normal. Muscular strength intact.   Peripheral pulses - radial=4/4, femoral=4/4, posterior tibial=4/4, dorsalis pedis=4/4,  Neuro - Gait normal. Reflexes normal and symmetric. Sensation grossly WNL.  Genital/Rectal - deferred          5/3/2024   Mini Cog   Clock Draw Score 2 Normal   3 Item Recall 3 objects recalled   Mini Cog Total Score 5          Signed Electronically by: Leonardo Angela MD         "

## 2024-05-04 VITALS
HEIGHT: 67 IN | OXYGEN SATURATION: 97 % | WEIGHT: 144.2 LBS | DIASTOLIC BLOOD PRESSURE: 68 MMHG | BODY MASS INDEX: 22.63 KG/M2 | HEART RATE: 90 BPM | SYSTOLIC BLOOD PRESSURE: 122 MMHG | TEMPERATURE: 98.6 F

## 2024-05-04 DIAGNOSIS — R05.9 COUGH, UNSPECIFIED TYPE: ICD-10-CM

## 2024-05-04 PROBLEM — H20.9 UVEITIS: Status: ACTIVE | Noted: 2024-05-04

## 2024-05-04 PROBLEM — R91.8 PULMONARY NODULES: Status: ACTIVE | Noted: 2024-05-04

## 2024-05-04 LAB
ALBUMIN SERPL BCG-MCNC: 4.4 G/DL (ref 3.5–5.2)
ALP SERPL-CCNC: 158 U/L (ref 40–150)
ALT SERPL W P-5'-P-CCNC: 46 U/L (ref 0–50)
ANION GAP SERPL CALCULATED.3IONS-SCNC: 12 MMOL/L (ref 7–15)
AST SERPL W P-5'-P-CCNC: 39 U/L (ref 0–45)
BILIRUB SERPL-MCNC: 0.4 MG/DL
BUN SERPL-MCNC: 11.2 MG/DL (ref 8–23)
CALCIUM SERPL-MCNC: 9.8 MG/DL (ref 8.8–10.2)
CHLORIDE SERPL-SCNC: 104 MMOL/L (ref 98–107)
CHOLEST SERPL-MCNC: 130 MG/DL
CREAT SERPL-MCNC: 0.69 MG/DL (ref 0.51–0.95)
DEPRECATED HCO3 PLAS-SCNC: 24 MMOL/L (ref 22–29)
EGFRCR SERPLBLD CKD-EPI 2021: >90 ML/MIN/1.73M2
FASTING STATUS PATIENT QL REPORTED: YES
GLUCOSE SERPL-MCNC: 96 MG/DL (ref 70–99)
HDLC SERPL-MCNC: 44 MG/DL
LDLC SERPL CALC-MCNC: 66 MG/DL
NONHDLC SERPL-MCNC: 86 MG/DL
POTASSIUM SERPL-SCNC: 4.1 MMOL/L (ref 3.4–5.3)
PROT SERPL-MCNC: 7.4 G/DL (ref 6.4–8.3)
SODIUM SERPL-SCNC: 140 MMOL/L (ref 135–145)
TRIGL SERPL-MCNC: 101 MG/DL
TSH SERPL DL<=0.005 MIU/L-ACNC: 1.87 UIU/ML (ref 0.3–4.2)

## 2024-05-04 RX ORDER — UMECLIDINIUM BROMIDE AND VILANTEROL TRIFENATATE 62.5; 25 UG/1; UG/1
1 POWDER RESPIRATORY (INHALATION) DAILY
Qty: 120 EACH | Refills: 3 | Status: SHIPPED | OUTPATIENT
Start: 2024-05-04

## 2024-05-08 ENCOUNTER — ANCILLARY PROCEDURE (OUTPATIENT)
Dept: BONE DENSITY | Facility: CLINIC | Age: 69
End: 2024-05-08
Attending: INTERNAL MEDICINE
Payer: COMMERCIAL

## 2024-05-08 DIAGNOSIS — Z78.0 ASYMPTOMATIC MENOPAUSAL STATE: ICD-10-CM

## 2024-05-08 PROCEDURE — 77080 DXA BONE DENSITY AXIAL: CPT | Mod: TC | Performed by: PHYSICIAN ASSISTANT

## 2024-05-17 ENCOUNTER — TRANSFERRED RECORDS (OUTPATIENT)
Dept: HEALTH INFORMATION MANAGEMENT | Facility: CLINIC | Age: 69
End: 2024-05-17
Payer: COMMERCIAL

## 2024-05-20 ENCOUNTER — TELEPHONE (OUTPATIENT)
Dept: INTERNAL MEDICINE | Facility: CLINIC | Age: 69
End: 2024-05-20

## 2024-05-20 DIAGNOSIS — E03.9 HYPOTHYROIDISM, UNSPECIFIED TYPE: ICD-10-CM

## 2024-05-20 RX ORDER — LEVOTHYROXINE SODIUM 100 UG/1
TABLET ORAL
Qty: 90 TABLET | Refills: 1 | Status: SHIPPED | OUTPATIENT
Start: 2024-05-20

## 2024-05-20 NOTE — TELEPHONE ENCOUNTER
General Call      Reason for Call:  Please fax most recent lab results to Dr Bryson Patel at [facility]. Fax number : [phone]. Also wondering the results from the bone density scan and if she should continue taking omeprazole.     What are your questions or concerns:  awaiting results    Could we send this information to you in [email or web address] or would you prefer to receive a phone call?:   Patient would prefer a phone call   Okay to leave a detailed message?: Yes at Cell number on file:    Telephone Information:   Mobile [phone]

## 2024-05-20 NOTE — TELEPHONE ENCOUNTER
Faxed results from recent labs to [phone].    Please advise for the bone density scan results and if the pt should continue with omeprazole medication.    Cheli Jauregui -[place]

## 2024-05-26 DIAGNOSIS — E78.5 HYPERLIPIDEMIA LDL GOAL <100: ICD-10-CM

## 2024-05-28 RX ORDER — ROSUVASTATIN CALCIUM 10 MG/1
10 TABLET, COATED ORAL DAILY
Qty: 90 TABLET | Refills: 2 | Status: SHIPPED | OUTPATIENT
Start: 2024-05-28

## 2024-05-29 ENCOUNTER — HOSPITAL ENCOUNTER (OUTPATIENT)
Dept: MRI IMAGING | Facility: CLINIC | Age: 69
Discharge: HOME | End: 2024-05-29
Attending: INTERNAL MEDICINE
Payer: COMMERCIAL

## 2024-05-29 ENCOUNTER — HOSPITAL ENCOUNTER (OUTPATIENT)
Dept: CT IMAGING | Facility: CLINIC | Age: 69
Discharge: HOME | End: 2024-05-29
Attending: INTERNAL MEDICINE
Payer: COMMERCIAL

## 2024-05-29 DIAGNOSIS — R91.8 PULMONARY NODULES: ICD-10-CM

## 2024-05-29 DIAGNOSIS — K86.2 PANCREATIC CYST: ICD-10-CM

## 2024-05-29 DIAGNOSIS — R05.9 COUGH, UNSPECIFIED TYPE: ICD-10-CM

## 2024-05-29 PROCEDURE — 255N000002 HC RX 255 OP 636: Performed by: INTERNAL MEDICINE

## 2024-05-29 PROCEDURE — A9585 GADOBUTROL INJECTION: HCPCS | Performed by: INTERNAL MEDICINE

## 2024-05-29 PROCEDURE — 71250 CT THORAX DX C-: CPT

## 2024-05-29 PROCEDURE — 74183 MRI ABD W/O CNTR FLWD CNTR: CPT

## 2024-05-29 RX ORDER — GADOBUTROL 604.72 MG/ML
6.5 INJECTION INTRAVENOUS ONCE
Status: COMPLETED | OUTPATIENT
Start: 2024-05-29 | End: 2024-05-29

## 2024-05-29 RX ADMIN — GADOBUTROL 6.5 ML: 604.72 INJECTION INTRAVENOUS at 09:50

## 2024-06-02 DIAGNOSIS — R05.9 COUGH, UNSPECIFIED TYPE: ICD-10-CM

## 2024-06-10 ENCOUNTER — TRANSFERRED RECORDS (OUTPATIENT)
Dept: HEALTH INFORMATION MANAGEMENT | Facility: CLINIC | Age: 69
End: 2024-06-10
Payer: COMMERCIAL

## 2024-06-11 ENCOUNTER — PATIENT OUTREACH (OUTPATIENT)
Dept: CARE COORDINATION | Facility: CLINIC | Age: 69
End: 2024-06-11
Payer: COMMERCIAL

## 2024-06-11 NOTE — PROGRESS NOTES
Clinic Care Coordination Contact  [facility]/Voicemail    Clinical Data: Care Coordinator Outreach    Outreach Documentation Number of Outreach Attempt   6/11/2024   2:44 PM 1       Left message on patient's voicemail with call back information and requested return call.    Plan: Care Coordinator sent care coordination introduction letter on 9/14/2021 via [employer]. Care Coordinator will try to reach patient again in 1 month.    Chanell Hanks RN Care Coordination   [facility] Harpreet Najera  Email: [email or web address]  Phone: [phone]

## 2024-06-17 ENCOUNTER — TRANSFERRED RECORDS (OUTPATIENT)
Dept: HEALTH INFORMATION MANAGEMENT | Facility: CLINIC | Age: 69
End: 2024-06-17
Payer: COMMERCIAL

## 2024-07-16 ENCOUNTER — PATIENT OUTREACH (OUTPATIENT)
Dept: CARE COORDINATION | Facility: CLINIC | Age: 69
End: 2024-07-16
Payer: COMMERCIAL

## 2024-07-16 NOTE — PROGRESS NOTES
Clinic Care Coordination Contact  [facility]/Voicemail    Clinical Data: Care Coordinator Outreach    Outreach Documentation Number of Outreach Attempt   6/11/2024   2:44 PM 1   7/16/2024  12:50 PM 1       Left message on patient's voicemail with call back information and requested return call.    Plan: Care Coordinator will send unable to contact letter with care coordinator contact information via [employer]. Care Coordinator will try to reach patient again in 10 business days.    Starr Woodson RN Clinic Care Coordinator  [facility] Clinics: [place], [place] (on-site Wednesdays), [facility] (on-site Thursdays) & [facility].  [email or web address]  Phone: [phone]

## 2024-07-16 NOTE — LETTER
[facility] CARE COORDINATION  [address] [facility] [address]    July 23, 2024        Irene Haile  [address] [facility] [address]          Dear [place],     Attached is an updated Patient Centered Plan of Care for your continued enrollment in Care Coordination. Please let us know if you have additional questions, concerns, or goals that we can assist with.    Sincerely,    Starr Woodson, RN Clinic Care Coordinator  [facility] Clinics: [place], [place] (on-site Wednesdays), [facility]site Thursdays) & [place] [facility].  [email or web address]  Phone: [phone]             [facility]  Patient Centered Plan of Care  About Me:        Patient Name:  Irene Haile    YOB: 1955  Age:         68 year old   [place] MRN:    [id number] Telephone Information:  Home Phone [phone]   Mobile [phone]       Address:  [address] [facility] [address] Email address:  [email or web address]      Emergency Contact(s)    Name Relationship Lgl Grd Work Phone Home Phone Mobile Phone   1. SRINATH HAILE Spouse  [phone]           Primary language:  English     needed? No   [facility] [employer]:  [phone] op. 1  Other communication barriers:None    Preferred Method of Communication:  Phone  Current living arrangement: I live in a private home with spouse (Srinath, )    Mobility Status/ Medical Equipment: Independent        Health Maintenance  Health Maintenance Reviewed: Due/Overdue   Health Maintenance Due   Topic Date Due    COPD ACTION PLAN  Never done    RSV VACCINE (Pregnancy & 60+) (1 - 1-dose 60+ series) Never done    COVID-19 Vaccine (5 - 2023-24 season) 09/01/2023    NICOTINE/TOBACCO CESSATION COUNSELING Q 1 YR  05/02/2024           My Access Plan  Medical Emergency 911   Primary Clinic Line [facility]* - [phone]   24 Hour Appointment Line  "[phone] or  [phone]) (toll-free)   24 Hour Nurse Line [phone] (toll-free)   Preferred Urgent Care [facility], [phone]     Preferred Hospital [facility]  [phone]     Preferred Pharmacy [id number] [address] [facility] AT [place] [address] & [facility]     Behavioral Health Crisis Line The National Suicide Prevention Lifeline at 1-460.598.3735 or Text/Call 278           My Care Team Members  Patient Care Team         Relationship Specialty Notifications Start End    Leonardo Angela MD PCP - General Internal Medicine  7/17/10     Phone: [phone] Fax: [phone]         [address] [place] [address]    Leonardo Angela MD Assigned PCP   2/14/21     Phone: [phone] Fax: [phone]         [address] [place] [address]    Shikha Riley MD MD Pulmonary Disease  9/29/21     Phone: [phone] Fax: [phone]         [address] [facility] [address] [address]    Polo Moreau DPM Assigned Musculoskeletal Provider   8/12/23     Phone: [phone] Fax: [phone]         [address] [facility] [address] [facility] [address]    Starr Woodson RN Lead Care Coordinator  Admissions 6/17/24                 My Care Plans  Self Management and Treatment Plan    Care Plan  Care Plan: Complete health maintenance and care gaps       Problem: Lifestyle choices       Goal: Medical       Start Date: 9/14/2021 Expected End Date: 10/29/2024    Recent Progress: 70%    Note:     Goal Statement: I will complete my recommended overdue health maintenance/care gaps.      Strengths: Strong advocate for self  Patient expressed understanding of goal: Yes  Action steps to achieve this goal:  I will try to quit smoking; on 9/10/2021 \"cold turkey\" x 2 weeks; Started back smoking 10/2021. Cut back on how many daily.   I will follow up with my providers as scheduled/recommended.  I will take my " medications as prescribed  4.   I will discuss, review, schedule and complete recommended overdue health maintenance with my primary care provider  5.   I will contact my care team with questions, concerns, support needs  6.   I will use the clinic as a resource and I understand I can contact my clinic with 24/7 after hours services available                            Care Plan: Advance Care Plan       Problem: Patient does not have a valid Health Care Directive       Goal: Complete Health Care Directive       Start Date: 5/24/2023 Expected End Date: 10/29/2024    Recent Progress: 10%    Note:     Goal Statement: I will complete a Health Care Directive and have this scanned into my Medical Record.  Barriers: None  Strengths: Strong advocate for self  Patient expressed understanding of goal: yes  Action steps to achieve this goal:  Care Coordination will mail me a Health Care Directive and any requested resources (goals worksheets, education sheets, class flyer).   I will complete the Health Care Directive. My signature must be either notarized or witnessed by two adults who are not named as health care agents in the document. Additionally only one of the witnesses can be employed by my health care system. If I need a notary I can check with my bank, my place of [employer], [employer] stores, or look online at [email or web address] .  I will provide a copy of my Health Care Directive to my care team and/or email directly to [email or web address].   I can visit [email or web address], email [email or web address] or call [facility] [employer] at [phone] (M-Friday 6a to 5p) for more information including free classes on completing a Health Care Directive.  I will contact my care team with any barriers, questions or assistance needed with this goal. Care coordinator will remain available as needed.                               Action Plans on File:                       Advance Care  Plans/Directives:   Advanced Care Plan/Directives on file:   No    Discussed with patient/caregiver(s): No data recorded           My Medical and Care Information  Problem List   Patient Active Problem List   Diagnosis    Tobacco use disorder    ACP (advance care planning)    COPD (chronic obstructive pulmonary disease) (H)    Hypothyroidism, unspecified type    Hyperlipidemia LDL goal <100    Pancreatic cyst    Positive OCTAVIANO (antinuclear antibody)    Uveitis    Pulmonary nodules      Current Medications and Allergies:     Allergies   Allergen Reactions    Amoxicillin-Pot Clavulanate       Vomiting/nausea    Atorvastatin      Muscle achiness      Augmented Betamethasone Diprop [Betamethasone] GI Disturbance    Codeine      Insomnia      Nitrofurantoin      Nausea, loss appetite    Shingrix [Zoster Vac Recomb Adjuvanted]      Severe body pain for 3-4 weeks     Current Outpatient Medications   Medication Sig Dispense Refill    albuterol (PROAIR HFA/PROVENTIL HFA/VENTOLIN HFA) 108 (90 Base) MCG/ACT inhaler Inhale 2 puffs into the lungs every 6 hours as needed for shortness of breath, wheezing or cough (Patient not taking: Reported on 5/3/2024) 18 g 11    levothyroxine (SYNTHROID/LEVOTHROID) 100 MCG tablet TAKE 1 TABLET(100 MCG) BY MOUTH DAILY 90 tablet 1    methotrexate 2.5 MG tablet 15 mg Usually on Mondays.      omeprazole (PRILOSEC) 20 MG DR capsule TAKE 1 CAPSULE(20 MG) BY MOUTH DAILY (Patient taking differently: Take 20 mg by mouth daily as needed) 30 capsule 2    rosuvastatin (CRESTOR) 10 MG tablet TAKE 1 TABLET(10 MG) BY MOUTH DAILY 90 tablet 2    umeclidinium-vilanterol (ANORO ELLIPTA) 62.5-25 MCG/ACT oral inhaler Inhale 1 puff into the lungs daily 120 each 3     No current facility-administered medications for this visit.       Care Coordination Start Date: 9/14/2021   Frequency of Care Coordination: monthly, more frequently as needed     Form Last Updated: 07/23/2024

## 2024-07-16 NOTE — LETTER
[facility] CARE COORDINATION  [address] [facility] [address]    July 16, 2024    Irene Haile  [address]  [facility] [id number]      Dear Myrna,    I am a clinic care coordinator who works with Leonardo Angela MD with the [facility]. I recently tried to call and was unable to reach you. Below is a description of clinic care coordination and how I can further assist you.       The clinic care coordination team is made up of a registered nurse, , financial resource worker and community health worker who understand the health care system. The goal of clinic care coordination is to help you manage your health and improve access to the health care system. Our team works alongside your provider to assist you in determining your health and social needs. We can help you obtain health care and community resources, providing you with necessary information and education. We can work with you through any barriers and develop a care plan that helps coordinate and strengthen the communication between you and your care team.  Our services are voluntary and are offered without charge to you personally.    Please feel free to contact me with any questions or concerns regarding care coordination and what we can offer.      We are focused on providing you with the highest-quality healthcare experience possible.    Sincerely,     Starr Woodson, RN Clinic Care Coordinator  [facility]: [place], [place] (on-site Wednesdays), [facility] (on-site Thursdays) & [facility].  [email or web address]  Phone: [phone]

## 2024-07-23 NOTE — PROGRESS NOTES
"Clinic Care Coordination Contact  Follow Up Progress Note      Assessment:   Patient has been doing well.   Will see Rheumatology in August to follow up on the increase to 6 - 2.5mg of Methotrexate.   Unsure if she's seen a difference in the dose.     She currently smokes. No interest at this time to quit.   Has cut back on amount she smokes daily, which is positive for the patient.     She declines the shingles vaccine.   Plans to update RSV, Covid & Flu in the fall.     Care Gaps:    Health Maintenance Due   Topic Date Due    COPD ACTION PLAN  Never done    RSV VACCINE (Pregnancy & 60+) (1 - 1-dose 60+ series) Never done    COVID-19 Vaccine (5 - 2023-24 season) 09/01/2023    NICOTINE/TOBACCO CESSATION COUNSELING Q 1 YR  05/02/2024       Care Gap Goal set: Yes    Care Plans  Care Plan: Complete health maintenance and care gaps       Problem: Lifestyle choices       Goal: Medical       Start Date: 9/14/2021 Expected End Date: 10/29/2024    Recent Progress: 70%    Note:     Goal Statement: I will complete my recommended overdue health maintenance/care gaps.      Strengths: Strong advocate for self  Patient expressed understanding of goal: Yes  Action steps to achieve this goal:  I will try to quit smoking; on 9/10/2021 \"cold turkey\" x 2 weeks; Started back smoking 10/2021. Cut back on how many daily.   I will follow up with my providers as scheduled/recommended.  I will take my medications as prescribed  4.   I will discuss, review, schedule and complete recommended overdue health maintenance with my primary care provider  5.   I will contact my care team with questions, concerns, support needs  6.   I will use the clinic as a resource and I understand I can contact my clinic with 24/7 after hours services available                            Care Plan: Advance Care Plan       Problem: Patient does not have a valid Health Care Directive       Goal: Complete Health Care Directive       Start Date: 5/24/2023 Expected " End Date: 10/29/2024    Recent Progress: 10%    Note:     Goal Statement: I will complete a Health Care Directive and have this scanned into my Medical Record.  Barriers: None  Strengths: Strong advocate for self  Patient expressed understanding of goal: yes  Action steps to achieve this goal:  Care Coordination will mail me a Health Care Directive and any requested resources (goals worksheets, education sheets, class flyer).   I will complete the Health Care Directive. My signature must be either notarized or witnessed by two adults who are not named as health care agents in the document. Additionally only one of the witnesses can be employed by my health care system. If I need a notary I can check with my bank, my place of [employer], [employer] stores, or look online at [email or web address] .  I will provide a copy of my Health Care Directive to my care team and/or email directly to [email or web address].   I can visit [email or web address] website, email [email or web address] or call [facility] [employer] at [phone] (M-Friday 6a to 5p) for more information including free classes on completing a Health Care Directive.  I will contact my care team with any barriers, questions or assistance needed with this goal. Care coordinator will remain available as needed.                               Intervention/Education provided during outreach: As above. CC role, goal(s), clinic after hours, appointments, discussed/reviewed. Support provided.       Outreach Frequency: monthly, more frequently as needed    Plan:   Care Coordinator will follow up in 1 month.     Starr Woodson RN Clinic Care Coordinator  [facility] Clinics: [place], [place] (on-site Wednesdays), [facility] (on-site Thursdays) & [facility].  [email or web address]  Phone: [phone]

## 2024-08-23 ENCOUNTER — TRANSFERRED RECORDS (OUTPATIENT)
Dept: HEALTH INFORMATION MANAGEMENT | Facility: CLINIC | Age: 69
End: 2024-08-23
Payer: COMMERCIAL

## 2024-08-23 LAB
ALT SERPL-CCNC: 26 IU/L (ref 6–32)
AST SERPL-CCNC: 27 U/L (ref 10–35)
CREATININE (EXTERNAL): 0.6 MG/DL (ref 0.5–1.05)

## 2024-08-27 ENCOUNTER — OFFICE VISIT (OUTPATIENT)
Dept: URGENT CARE | Facility: URGENT CARE | Age: 69
End: 2024-08-27
Payer: COMMERCIAL

## 2024-08-27 VITALS
SYSTOLIC BLOOD PRESSURE: 119 MMHG | RESPIRATION RATE: 20 BRPM | DIASTOLIC BLOOD PRESSURE: 68 MMHG | TEMPERATURE: 98 F | HEART RATE: 72 BPM | OXYGEN SATURATION: 98 %

## 2024-08-27 DIAGNOSIS — M79.89 PAIN AND SWELLING OF LEFT LOWER LEG: Primary | ICD-10-CM

## 2024-08-27 DIAGNOSIS — M79.662 PAIN AND SWELLING OF LEFT LOWER LEG: Primary | ICD-10-CM

## 2024-08-27 PROCEDURE — 99214 OFFICE O/P EST MOD 30 MIN: CPT

## 2024-08-27 SDOH — HEALTH STABILITY: PHYSICAL HEALTH: ON AVERAGE, HOW MANY MINUTES DO YOU ENGAGE IN EXERCISE AT THIS LEVEL?: 60 MIN

## 2024-08-27 SDOH — HEALTH STABILITY: PHYSICAL HEALTH: ON AVERAGE, HOW MANY DAYS PER WEEK DO YOU ENGAGE IN MODERATE TO STRENUOUS EXERCISE (LIKE A BRISK WALK)?: 2 DAYS

## 2024-08-27 ASSESSMENT — LIFESTYLE VARIABLES
HOW MANY STANDARD DRINKS CONTAINING ALCOHOL DO YOU HAVE ON A TYPICAL DAY: 1 OR 2
AUDIT-C TOTAL SCORE: 1
HOW OFTEN DO YOU HAVE SIX OR MORE DRINKS ON ONE OCCASION: NEVER
SKIP TO QUESTIONS 9-10: 1
HOW OFTEN DO YOU HAVE A DRINK CONTAINING ALCOHOL: MONTHLY OR LESS

## 2024-08-27 ASSESSMENT — SOCIAL DETERMINANTS OF HEALTH (SDOH)
HOW OFTEN DO YOU ATTENT MEETINGS OF THE CLUB OR ORGANIZATION YOU BELONG TO?: NEVER
HOW OFTEN DO YOU ATTEND CHURCH OR RELIGIOUS SERVICES?: 1 TO 4 TIMES PER YEAR
IN A TYPICAL WEEK, HOW MANY TIMES DO YOU TALK ON THE PHONE WITH FAMILY, FRIENDS, OR NEIGHBORS?: MORE THAN THREE TIMES A WEEK
DO YOU BELONG TO ANY CLUBS OR ORGANIZATIONS SUCH AS CHURCH GROUPS UNIONS, FRATERNAL OR ATHLETIC GROUPS, OR SCHOOL GROUPS?: NO
HOW OFTEN DO YOU GET TOGETHER WITH FRIENDS OR RELATIVES?: TWICE A WEEK

## 2024-08-27 NOTE — PATIENT INSTRUCTIONS
Given your persistent swelling and discomfort since the trauma to the area approximately 3 weeks ago; it is advised that you proceed to the acute diagnostic services clinic in [place] tomorrow at 9:30 AM for further evaluation and treatment.  We will perform tests to evaluate if this is a hematoma versus a blood clot.  Do not eat or drink prior to your arrival.  Go to the emergency department with any new or worsening symptoms.

## 2024-08-27 NOTE — PROGRESS NOTES
"Assessment & Plan   (M79.662,  M79.89) Pain and swelling of left lower leg  (primary encounter diagnosis)  Plan: Referral to Acute and Diagnostic Services (Day         of diagnostic / First order acute)    Informed the patient that given her persistent swelling and discomfort since a trauma to the area approximately 3 weeks ago; is advised that she proceed to the acute diagnostic services clinic in [place] tomorrow at 9:30 AM for further evaluation and treatment.  I spoke with the acute diagnostic services clinic in [place] today and they have arranged for the patient to be seen and evaluated at 9:30 AM tomorrow.  The patient and I discussed that we will perform test to evaluate if this is a hematoma versus a blood clot.  I instructed her not to eat or drink prior to arrival.  We also discussed the need to go to the emergency department prior to the appointment tomorrow if there are any new or worsening symptoms.  Patient acknowledged her understanding of the above plan.    The use of Dragon/[employer] dictation services may have been used to construct the content in this note; any grammatical or spelling errors are non-intentional. Please contact the author of this note directly if you are in need of any clarification.      ARUN Akins CNP  [facility] MITALI Norris is a 68 year old female who presents to clinic today for the following health issues:  Chief Complaint   Patient presents with    Urgent Care     Bruise/swelling  on L leg X3 weeks ago   No injury     HPI  The patient hit her left lower leg on a coffee table approximately three weeks ago.  She is concerned about the area of swelling not fully being resolved, continued mild tenderness and a \"tight feeling\" over the area.     ROS:  Negative except noted above.    Review of Systems        Objective    /68   Pulse 72   Temp 98  F (36.7  C) (Tympanic)   Resp 20   LMP  (LMP Unknown)   SpO2 98% "   Physical Exam   GENERAL: alert and no distress  MS: Left lower extremity -edematous, indurated area below the left knee that is mildly tender upon palpation.  No ecchymosis.  Full ROM.  SKIN: See above  NEURO: Normal strength and tone, mentation intact and speech normal

## 2024-08-28 ENCOUNTER — HOSPITAL ENCOUNTER (OUTPATIENT)
Dept: ULTRASOUND IMAGING | Facility: CLINIC | Age: 69
Discharge: HOME | End: 2024-08-28
Attending: NURSE PRACTITIONER | Admitting: NURSE PRACTITIONER
Payer: COMMERCIAL

## 2024-08-28 ENCOUNTER — PATIENT OUTREACH (OUTPATIENT)
Dept: CARE COORDINATION | Facility: CLINIC | Age: 69
End: 2024-08-28

## 2024-08-28 ENCOUNTER — OFFICE VISIT (OUTPATIENT)
Dept: PEDIATRICS | Facility: CLINIC | Age: 69
End: 2024-08-28
Payer: COMMERCIAL

## 2024-08-28 VITALS
HEART RATE: 84 BPM | DIASTOLIC BLOOD PRESSURE: 76 MMHG | HEIGHT: 67 IN | WEIGHT: 144 LBS | OXYGEN SATURATION: 98 % | BODY MASS INDEX: 22.6 KG/M2 | RESPIRATION RATE: 18 BRPM | TEMPERATURE: 98.1 F | SYSTOLIC BLOOD PRESSURE: 130 MMHG

## 2024-08-28 DIAGNOSIS — M79.662 PAIN AND SWELLING OF LEFT LOWER LEG: ICD-10-CM

## 2024-08-28 DIAGNOSIS — M79.89 PAIN AND SWELLING OF LEFT LOWER LEG: ICD-10-CM

## 2024-08-28 PROCEDURE — 93971 EXTREMITY STUDY: CPT | Mod: LT

## 2024-08-28 PROCEDURE — 99214 OFFICE O/P EST MOD 30 MIN: CPT | Performed by: NURSE PRACTITIONER

## 2024-08-28 NOTE — PROGRESS NOTES
Clinic Care Coordination Contact  [facility]/Voicemail    Clinical Data: Care Coordinator Outreach    Outreach Documentation Number of Outreach Attempt   7/16/2024  12:50 PM 1   8/28/2024   3:43 PM 1       Left message on patient's voicemail with call back information and requested return call.    Plan: Care Coordinator will try to reach patient again in 10 business days.    Elsy Cortes, CHW, B.A. [facility] Care Coordination  [facility]:   [facility]  [phone]

## 2024-08-28 NOTE — PATIENT INSTRUCTIONS
Results for orders placed or performed during the hospital encounter of 08/28/24   US Lower Extremity Venous Duplex Left     Status: None    Narrative    VENOUS ULTRASOUND LEFT LOWER EXTREMITY  8/28/2024 10:25 AM     HISTORY: Pain and swelling of left lower leg.    COMPARISON: None.    TECHNIQUE: Color Doppler and spectral waveform analysis performed  throughout the deep veins of the left lower extremity.    FINDINGS: The common femoral, proximal greater saphenous, femoral, and  popliteal veins demonstrate normal blood flow, compression, and  augmentation. Posterior tibial and peroneal veins are patent. Tiny  focal area of edema noted in the subcutaneous tissue measuring 3 cm in  length and 2 mm in depth. Contralateral right common femoral vein is  patent.      Impression    IMPRESSION:   1. Negative for DVT in the left lower extremity.  2. Area of lump is tiny focal area of edema in the subcutaneous  tissue.    ELIANA ESPARZA MD         SYSTEM ID:  [id number]     US is negative for DVT, encouraged to quit smoking.  Pt not interested at this time.    Will follow up with PCP if worsening.

## 2024-08-28 NOTE — PROGRESS NOTES
Acute and Diagnostic Services Clinic Visit    Assessment & Plan     Pain and swelling of left lower leg  - Referral to Acute and Diagnostic Services (Day of diagnostic / First order acute)  - US Lower Extremity Venous Duplex Left; Future      Patient Instructions     Results for orders placed or performed during the hospital encounter of 08/28/24   US Lower Extremity Venous Duplex Left     Status: None    Narrative    VENOUS ULTRASOUND LEFT LOWER EXTREMITY  8/28/2024 10:25 AM     HISTORY: Pain and swelling of left lower leg.    COMPARISON: None.    TECHNIQUE: Color Doppler and spectral waveform analysis performed  throughout the deep veins of the left lower extremity.    FINDINGS: The common femoral, proximal greater saphenous, femoral, and  popliteal veins demonstrate normal blood flow, compression, and  augmentation. Posterior tibial and peroneal veins are patent. Tiny  focal area of edema noted in the subcutaneous tissue measuring 3 cm in  length and 2 mm in depth. Contralateral right common femoral vein is  patent.      Impression    IMPRESSION:   1. Negative for DVT in the left lower extremity.  2. Area of lump is tiny focal area of edema in the subcutaneous  tissue.    ELIANA ESPARZA MD         SYSTEM ID:  [id number]     US is negative for DVT, encouraged to quit smoking.  Pt not interested at this time.    Will follow up with PCP if worsening.        Nicotine/Tobacco Cessation  She reports that she has been smoking cigarettes. She started smoking about 32 years ago. She has a 22.5 pack-year smoking history. She has never used smokeless tobacco.  Nicotine/Tobacco Cessation Plan  Information offered: Patient not interested at this time          Return in about 1 week (around 9/4/2024) for with regular provider if symptoms persist.    Gayatri Norris is a 68 year old, presenting for the following health issues:  Deep Vein Thrombosis    HPI     Evaluation for possible DVT  Onset/Duration: 3  "weeks  Description:       Location: left leg       Redness: YES        Pain: mild,  throbbing discomfort       Warmth: no        Joint swelling YES  Progression of symptoms better  Accompanying signs and symptoms:       Fevers: no        Numbness/tingling/weakness: no        Chest pain/pleurisy: no        Shortness of breath: no   History        Trauma: YES, bumped her left leg to coffee table 1  month ago. Small red bump below her knee        Recent travel/when: no         Previous history of DVT: no         Family history of DVT: no         Recent surgery: no   Aggravating factors include: none  Therapies tried and outcome: neosporin   Prior surgery on arteries of veins in this area: No          Review of Systems  Constitutional, neuro, ENT, endocrine, pulmonary, cardiac, gastrointestinal, genitourinary, musculoskeletal, integument and psychiatric systems are negative, except as otherwise noted.      Objective    /76 (BP Location: Right arm, Patient Position: Sitting, Cuff Size: Adult Regular)   Pulse 84   Temp 98.1  F (36.7  C) (Oral)   Resp 18   Ht 1.702 m (5' 7\")   Wt 65.3 kg (144 lb)   LMP  (LMP Unknown)   SpO2 98%   BMI 22.55 kg/m    Body mass index is 22.55 kg/m .  Physical Exam   GENERAL: alert and no distress  RESP: lungs clear to auscultation - no rales, rhonchi or wheezes  CV: regular rate and rhythm, normal S1 S2, no S3 or S4, no murmur, click or rub, no peripheral edema  MS: no gross musculoskeletal defects noted, localized nodular edema over anterior left shin approx 2 cm in diameter  SKIN:  2 cm nodule on left anterior shin proximal to patella.  No erythema or warmth.            Signed Electronically by: ARUN Mcintosh CNP    "

## 2024-08-29 ENCOUNTER — PATIENT OUTREACH (OUTPATIENT)
Dept: CARE COORDINATION | Facility: CLINIC | Age: 69
End: 2024-08-29
Payer: COMMERCIAL

## 2024-08-29 NOTE — PROGRESS NOTES
Clinic Care Coordination Contact    Situation: Patient chart reviewed by care coordinator.    Background: Care Coordination initial assessment and enrollment to Care Coordination was 9/14/2021. Patient centered goal(s) developed with participation from patient.  RN CC handed patient off to CHW for continued outreach every 30 days. Patient is not due for an updated complex care plan. Annual Assessment will be due October 2024.  .  Assessment: CHW CC is in process of outreaching to patient, recently unable to contact patient 8/28/2024. Next CHW CC outreach attempt scheduled for 9/11/2024.     Plan/Recommendations: CHW CC will route request to RNCC to review for disenrollment per standard work if next outreach attempt is unsuccessful. RNCC will extend and complete clinical review after CHW CC has made contact with patient. RNCC will remain available as needed.     Starr Woodson RN Clinic Care Coordinator  [facility] Clinics: [place], [place] (on-site Wednesdays), [facility] (on-site Thursdays) & [facility].  [email or web address]  Phone: [phone]

## 2024-09-04 ENCOUNTER — TRANSFERRED RECORDS (OUTPATIENT)
Dept: HEALTH INFORMATION MANAGEMENT | Facility: CLINIC | Age: 69
End: 2024-09-04
Payer: COMMERCIAL

## 2024-09-12 ENCOUNTER — PATIENT OUTREACH (OUTPATIENT)
Dept: CARE COORDINATION | Facility: CLINIC | Age: 69
End: 2024-09-12
Payer: COMMERCIAL

## 2024-09-12 NOTE — PROGRESS NOTES
"Clinic Care Coordination Contact  Community Health Worker Follow Up    Care Gaps:     Health Maintenance Due   Topic Date Due    COPD ACTION PLAN  Never done    RSV VACCINE (1 - Risk 60-74 years 1-dose series) Never done    INFLUENZA VACCINE (1) 09/01/2024    COVID-19 Vaccine (5 - 2023-24 season) 09/01/2024       Did Not Address    Care Plan:   Care Plan: Complete health maintenance and care gaps       Problem: Lifestyle choices       Goal: Medical       Start Date: 9/14/2021 Expected End Date: 10/29/2024    This Visit's Progress: 80% Recent Progress: 70%    Note:     Goal Statement: I will complete my recommended overdue health maintenance/care gaps.      Strengths: Strong advocate for self  Patient expressed understanding of goal: Yes  Action steps to achieve this goal:  I will try to quit smoking; on 9/10/2021 \"cold turkey\" x 2 weeks; Started back smoking 10/2021. Cut back on how many daily.   I will follow up with my providers as scheduled/recommended.  I will take my medications as prescribed  4.   I will discuss, review, schedule and complete recommended overdue health maintenance with my primary care provider  5.   I will contact my care team with questions, concerns, support needs  6.   I will use the clinic as a resource and I understand I can contact my clinic with 24/7 after hours services available                            Care Plan: Advance Care Plan       Problem: Patient does not have a valid Health Care Directive       Goal: Complete Health Care Directive       Start Date: 5/24/2023 Expected End Date: 12/30/2024    This Visit's Progress: 20% Recent Progress: 10%    Note:     Goal Statement: I will complete a Health Care Directive and have this scanned into my Medical Record.  Barriers: None  Strengths: Strong advocate for self  Patient expressed understanding of goal: yes  Action steps to achieve this goal:  Care Coordination will mail me a Health Care Directive and any requested resources (goals " worksheets, education sheets, class flyer).   I will complete the Health Care Directive. My signature must be either notarized or witnessed by two adults who are not named as health care agents in the document. Additionally only one of the witnesses can be employed by my health care system. If I need a notary I can check with my bank, my place of [employer], [employer] stores, or look online at www.[email or web address] .  I will provide a copy of my Health Care Directive to my care team and/or email directly to [email or web address].   I can visit [email or web address] website, email [email or web address] or call [facility] [employer] Roberting [employer] at [phone] (M-Friday 6a to 5p) for more information including free classes on completing a Health Care Directive.  I will contact my care team with any barriers, questions or assistance needed with this goal. Care coordinator will remain available as needed.                           Discussed:  Patient stated she just got home from exercise class.  Patient stated she attends class twice a week.  Patient stated things are going well.  Patient stated she has plenty of food.  Patient stated she still drives, transportation resources are not needed.  Patient stated she needs to schedule a driving test to renew her license.  Patient stated she had the [email or web address] paperwork.  Patient stated she has not worked on the forms.    Intervention and Education during outreach:     CHW encouraged patient to contact CC if there are any other changes or resources needed.  Patient acknowledged understanding.      CHW Plan: will outreach in one month    FERNANDA Mixon  Clinic Care Coordination  [facility] Clinics: [facility] [place], and [facility]  Phone: [phone]

## 2024-10-11 ENCOUNTER — PATIENT OUTREACH (OUTPATIENT)
Dept: CARE COORDINATION | Facility: CLINIC | Age: 69
End: 2024-10-11
Payer: COMMERCIAL

## 2024-10-11 NOTE — LETTER
[facility]  [address] [facility] [address]    October 11, 2024        Irene Haile  [address] [facility] [address]          Dear [place],     Attached is an updated Patient Centered Plan of Care for your continued enrollment in Care Coordination. Please let us know if you have additional questions, concerns, or goals that we can assist with.    Sincerely,    Starr Woodson, RN Clinic Care Coordinator  [facility] Clinics: [place], [place] (on-site Wednesdays), [facility] ([facility] Thursdays) & [facility].  [email or web address]  Phone: [phone]           [facility]  Patient Centered Plan of Care  About Me:        Patient Name:  Irene Haile    YOB: 1955  Age:         68 year old   [place] MRN:    [id number] Telephone Information:  Home Phone [phone]   Mobile [phone]       Address:  [address] [facility]  [place] [address] Email address:  [email or web address]      Emergency Contact(s)    Name Relationship Lgl Grd Work Phone Home Phone Mobile Phone   1. SRINATH HAILE Spouse  [phone]           Primary language:  English     needed? No   [place] [employer]:  [phone] op. 1  Other communication barriers:None    Preferred Method of Communication:  Phone  Current living arrangement: I live in a private home with spouse (Srinath, )    Mobility Status/ Medical Equipment: Independent        Health Maintenance  Health Maintenance Reviewed: Due/Overdue   Health Maintenance Due   Topic Date Due    COPD ACTION PLAN  Never done    RSV VACCINE (1 - Risk 60-74 years 1-dose series) Never done    INFLUENZA VACCINE (1) 09/01/2024    COVID-19 Vaccine (5 - 2024-25 season) 09/01/2024           My Access Plan  Medical Emergency 911   Primary Clinic Line [facility]* - [phone]   24 Hour Appointment Line [phone]  "or  [phone]) (toll-free)   24 Hour Nurse Line [phone] (toll-free)   Preferred Urgent Care [facility], [phone]     Preferred Hospital [facility]  [phone]     Preferred Pharmacy [id number] [address] [facility] [address] [facility] [address] & [facility]     Behavioral Health Crisis Line The National Suicide Prevention Lifeline at 1-270.765.9570 or Text/Call 988           My Care Team Members  Patient Care Team         Relationship Specialty Notifications Start End    Leonardo Angela MD PCP - General Internal Medicine  7/17/10     Phone: [phone] Fax: [phone]         [address] [place] [address]    Leonardo Angela MD Assigned PCP   2/14/21     Phone: [phone] Fax: [phone]         [address] [place] [address]    Shikha Riley MD MD Pulmonary Disease  9/29/21     Phone: [phone] Fax: [phone]         [address] [facility] [address] [address]    Polo Moreau DPM Assigned Musculoskeletal Provider   8/12/23     Phone: [phone] Fax: [phone]         [address] [employer] [address] [place] [address]    Starr Woodson RN Lead Care Coordinator  Admissions 6/17/24     Melanie Sheehan CHW Community Health Worker Primary Care - CC Admissions 7/23/24     Phone: [phone]                     My Care Plans  Self Management and Treatment Plan    Care Plan  Care Plan: Complete health maintenance and care gaps       Problem: Lifestyle choices       Goal: Medical       Start Date: 9/14/2021 Expected End Date: 10/29/2024    This Visit's Progress: 80% Recent Progress: 70%    Note:     Goal Statement: I will complete my recommended overdue health maintenance/care gaps.      Strengths: Strong advocate for self  Patient expressed understanding of goal: Yes  Action steps to achieve this goal:  I will try to quit smoking; on 9/10/2021 \"cold turkey\" x 2 weeks; Started " back smoking 10/2021. Cut back on how many daily.   I will follow up with my providers as scheduled/recommended.  I will take my medications as prescribed  4.   I will discuss, review, schedule and complete recommended overdue health maintenance with my primary care provider  5.   I will contact my care team with questions, concerns, support needs  6.   I will use the clinic as a resource and I understand I can contact my clinic with 24/7 after hours services available                            Care Plan: Advance Care Plan       Problem: Patient does not have a valid Health Care Directive       Goal: Complete Health Care Directive       Start Date: 5/24/2023 Expected End Date: 12/30/2024    This Visit's Progress: 20% Recent Progress: 10%    Note:     Goal Statement: I will complete a Health Care Directive and have this scanned into my Medical Record.  Barriers: None  Strengths: Strong advocate for self  Patient expressed understanding of goal: yes  Action steps to achieve this goal:  Care Coordination will mail me a Health Care Directive and any requested resources (goals worksheets, education sheets, class flyer).   I will complete the Health Care Directive. My signature must be either notarized or witnessed by two adults who are not named as health care agents in the document. Additionally only one of the witnesses can be employed by my health care system. If I need a notary I can check with my bank, my place of Faith, [employer] stores, or look online at [email or web address] .  I will provide a copy of my Health Care Directive to my care team and/or email directly to [email or web address].   I can visit [email or web address] website, email [email or web address] or call [facility] at [phone] (M-Friday 6a to 5p) for more information including free classes on completing a Health Care Directive.  I will contact my care team with any barriers, questions or assistance needed with  this goal. Care coordinator will remain available as needed.                               Action Plans on File:                       Advance Care Plans/Directives:   Advanced Care Plan/Directives on file:   No    Discussed with patient/caregiver(s): No data recorded           My Medical and Care Information  Problem List   Patient Active Problem List   Diagnosis    Tobacco use disorder    ACP (advance care planning)    COPD (chronic obstructive pulmonary disease) (H)    Hypothyroidism, unspecified type    Hyperlipidemia LDL goal <100    Pancreatic cyst    Positive OCTAVIANO (antinuclear antibody)    Uveitis    Pulmonary nodules      Current Medications and Allergies:     Allergies   Allergen Reactions    Amoxicillin-Pot Clavulanate       Vomiting/nausea    Atorvastatin      Muscle achiness      Augmented Betamethasone Diprop [Betamethasone] GI Disturbance    Codeine      Insomnia      Nitrofurantoin      Nausea, loss appetite    Shingrix [Zoster Vac Recomb Adjuvanted]      Severe body pain for 3-4 weeks     Current Outpatient Medications   Medication Sig Dispense Refill    albuterol (PROAIR HFA/PROVENTIL HFA/VENTOLIN HFA) 108 (90 Base) MCG/ACT inhaler Inhale 2 puffs into the lungs every 6 hours as needed for shortness of breath, wheezing or cough (Patient not taking: Reported on 5/3/2024) 18 g 11    levothyroxine (SYNTHROID/LEVOTHROID) 100 MCG tablet TAKE 1 TABLET(100 MCG) BY MOUTH DAILY 90 tablet 1    methotrexate 2.5 MG tablet 15 mg Usually on Mondays.      omeprazole (PRILOSEC) 20 MG DR capsule TAKE 1 CAPSULE(20 MG) BY MOUTH DAILY (Patient not taking: Reported on 8/27/2024) 30 capsule 2    rosuvastatin (CRESTOR) 10 MG tablet TAKE 1 TABLET(10 MG) BY MOUTH DAILY 90 tablet 2    umeclidinium-vilanterol (ANORO ELLIPTA) 62.5-25 MCG/ACT oral inhaler Inhale 1 puff into the lungs daily 120 each 3     No current facility-administered medications for this visit.         Care Coordination Start Date: 9/14/2021   Frequency of  Care Coordination: monthly, more frequently as needed     Form Last Updated: 10/11/2024

## 2024-10-11 NOTE — PROGRESS NOTES
"Clinic Care Coordination Contact  Care Coordination Clinician Chart Review    Situation: Patient chart reviewed by Care Coordinator.       Background: Care Coordination Program started: 9/14/2021. Initial assessment completed and patient-centered care plan(s) were developed with participation from patient. Lead CC handed patient off to CHW for continued outreaches.       Assessment: Per chart review, patient outreach completed by CC CHW on 9/12/24.  Patient is actively working to accomplish goal(s). Patient's goal(s) appropriate and relevant at this time. Patient is due for updated Plan of Care.  Assessments will be completed annually or as needed/with change of patient status.      Care Plan: Complete health maintenance and care gaps       Problem: Lifestyle choices       Goal: Medical       Start Date: 9/14/2021 Expected End Date: 10/29/2024    This Visit's Progress: 80% Recent Progress: 70%    Note:     Goal Statement: I will complete my recommended overdue health maintenance/care gaps.      Strengths: Strong advocate for self  Patient expressed understanding of goal: Yes  Action steps to achieve this goal:  I will try to quit smoking; on 9/10/2021 \"cold turkey\" x 2 weeks; Started back smoking 10/2021. Cut back on how many daily.   I will follow up with my providers as scheduled/recommended.  I will take my medications as prescribed  4.   I will discuss, review, schedule and complete recommended overdue health maintenance with my primary care provider  5.   I will contact my care team with questions, concerns, support needs  6.   I will use the clinic as a resource and I understand I can contact my clinic with 24/7 after hours services available                            Care Plan: Advance Care Plan       Problem: Patient does not have a valid Health Care Directive       Goal: Complete Health Care Directive       Start Date: 5/24/2023 Expected End Date: 12/30/2024    This Visit's Progress: 20% Recent Progress: " 10%    Note:     Goal Statement: I will complete a Health Care Directive and have this scanned into my Medical Record.  Barriers: None  Strengths: Strong advocate for self  Patient expressed understanding of goal: yes  Action steps to achieve this goal:  Care Coordination will mail me a Health Care Directive and any requested resources (goals worksheets, education sheets, class flyer).   I will complete the Health Care Directive. My signature must be either notarized or witnessed by two adults who are not named as health care agents in the document. Additionally only one of the witnesses can be employed by my health care system. If I need a notary I can check with my bank, my place of Spiritism, [employer] stores, or look online at www.[email or web address] .  I will provide a copy of my Health Care Directive to my care team and/or email directly to [email or web address].   I can visit [email or web address] website, email [email or web address] or call [facility] at [phone] (M-Friday 6a to 5p) for more information including free classes on completing a Health Care Directive.  I will contact my care team with any barriers, questions or assistance needed with this goal. Care coordinator will remain available as needed.                                  Plan/Recommendations: The patient will continue working with Care Coordination to achieve goal(s) as above. CHW will continue outreaches at minimum every 30 days and will involve Lead CC as needed or if patient is ready to move to Maintenance. Lead CC will continue to monitor CHW outreaches and patient's progress to goal(s) every 6 weeks.     Plan of Care updated and sent to patient: Yes, via Jasmeetharshawn Woodson RN Clinic Care Coordinator  [facility] Clinics: [place], [place] (on-site Wednesdays), [facility] (on-site Thursdays) & [facility].  [email or web address]  Phone: [phone]            breastfeeding exclusively

## 2024-10-24 ENCOUNTER — PATIENT OUTREACH (OUTPATIENT)
Dept: CARE COORDINATION | Facility: CLINIC | Age: 69
End: 2024-10-24
Payer: COMMERCIAL

## 2024-10-24 NOTE — PROGRESS NOTES
Clinic Care Coordination Contact  [facility]/Voicemail    Clinical Data: Care Coordinator Outreach    Outreach Documentation Number of Outreach Attempt   10/24/2024   3:10 PM 1       Left message on patient's voicemail with call back information and requested return call.      Plan: Care Coordinator will try to reach patient again in 10 business days.    FERNANDA Sagastume, B.S. [facility] Care Coordination  [facility] Clinics:  [place], [place] and [place]  [phone]  [email or web address]

## 2024-11-06 ENCOUNTER — TRANSFERRED RECORDS (OUTPATIENT)
Dept: HEALTH INFORMATION MANAGEMENT | Facility: CLINIC | Age: 69
End: 2024-11-06
Payer: COMMERCIAL

## 2024-11-18 ENCOUNTER — PATIENT OUTREACH (OUTPATIENT)
Dept: CARE COORDINATION | Facility: CLINIC | Age: 69
End: 2024-11-18
Payer: COMMERCIAL

## 2024-11-18 NOTE — PROGRESS NOTES
"Clinic Care Coordination Contact  Community Health Worker Follow Up    Care Gaps:     Health Maintenance Due   Topic Date Due    COPD ACTION PLAN  Never done    RSV VACCINE (1 - Risk 60-74 years 1-dose series) Never done    COVID-19 Vaccine (5 - 2024-25 season) 09/01/2024       Care Gaps Last addressed on 11/18/24    Care Plan:   Care Plan: Complete health maintenance and care gaps       Problem: Lifestyle choices       Goal: Medical       Start Date: 9/14/2021 Expected End Date: 10/29/2024    This Visit's Progress: 100% Recent Progress: 80%    Note:     Goal Statement: I will complete my recommended overdue health maintenance/care gaps.      Strengths: Strong advocate for self  Patient expressed understanding of goal: Yes  Action steps to achieve this goal:  I will try to quit smoking; on 9/10/2021 \"cold turkey\" x 2 weeks; Started back smoking 10/2021. Cut back on how many daily.   I will follow up with my providers as scheduled/recommended.  I will take my medications as prescribed  4.   I will discuss, review, schedule and complete recommended overdue health maintenance with my primary care provider  5.   I will contact my care team with questions, concerns, support needs  6.   I will use the clinic as a resource and I understand I can contact my clinic with 24/7 after hours services available                            Care Plan: Advance Care Plan       Problem: Patient does not have a valid Health Care Directive       Goal: Complete Health Care Directive       Start Date: 5/24/2023 Expected End Date: 2/17/2025    This Visit's Progress: 20% Recent Progress: 20%    Note:     Goal Statement: I will complete a Health Care Directive and have this scanned into my Medical Record.  Barriers: None  Strengths: Strong advocate for self  Patient expressed understanding of goal: yes  Action steps to achieve this goal:  Care Coordination will mail me a Health Care Directive and any requested resources (goals worksheets, " education sheets, class flyer).   I will complete the Health Care Directive. My signature must be either notarized or witnessed by two adults who are not named as health care agents in the document. Additionally only one of the witnesses can be employed by my health care system. If I need a notary I can check with my bank, my place of Evangelical, [employer] stores, or look online at [email or web address].[email or web address] .  I will provide a copy of my Health Care Directive to my care team and/or email directly to [email or web address].   I can visit [email or web address] website, email [email or web address] or call [facility] [employer]ius Networksing RippleFunction at [phone] (M-Friday 6a to 5p) for more information including free classes on completing a Health Care Directive.  I will contact my care team with any barriers, questions or assistance needed with this goal. Care coordinator will remain available as needed.                           Discussed:  Patient stated she exercises twice a week on M & Th.  Patient stated she did not attend class today, because she had a dental appointment this morning.  Patient stated she is still driving.  Patient stated she does not drive at night.  Patient stated she has an appointment [facility] the [facility] on [facility]20 and the Rheumatology on 11/22.  Patient stated she has a designated  for the two appointments.  Patient stated she had a Covid and Flu vaccine on 10/8 and a RSV vaccine on 11/4 all at the [facility].  Patient stated she has not started the HCD paperwork, but will start after the holidays.      Intervention and Education during outreach:     CHW encouraged patient to contact CC if there are any other changes or resources needed.  Patient acknowledged understanding.      CHW Plan: will outreach in one month.    FERNANDA Mixon  Clinic Care Coordination  [facility] Clinics: [place], [place], [place], and [facility]  Phone: [phone]

## 2024-11-20 DIAGNOSIS — E03.9 HYPOTHYROIDISM, UNSPECIFIED TYPE: ICD-10-CM

## 2024-11-20 RX ORDER — LEVOTHYROXINE SODIUM 100 UG/1
TABLET ORAL
Qty: 90 TABLET | Refills: 1 | Status: SHIPPED | OUTPATIENT
Start: 2024-11-20

## 2024-11-22 ENCOUNTER — TRANSFERRED RECORDS (OUTPATIENT)
Dept: HEALTH INFORMATION MANAGEMENT | Facility: CLINIC | Age: 69
End: 2024-11-22
Payer: COMMERCIAL

## 2024-11-25 ENCOUNTER — PATIENT OUTREACH (OUTPATIENT)
Dept: CARE COORDINATION | Facility: CLINIC | Age: 69
End: 2024-11-25
Payer: COMMERCIAL

## 2024-11-25 NOTE — LETTER
[facility]  [address] [facility] [address]    November 25, 2024        Irene Haile  [address] [facility] [address]          Dear [place],     Attached is an updated Patient Centered Plan of Care for your continued enrollment in Care Coordination. Please let us know if you have additional questions, concerns, or goals that we can assist with.    Sincerely,    Starr Woodson, RN Clinic Care Coordinator  [facility] Clinics: [place], [place] (on-site Wednesdays), [facility] (on-site Thursdays) & [facility].  [email or web address]  Phone: [phone]               [facility]  Patient Centered Plan of Care  About Me:        Patient Name:  Irene Haile    YOB: 1955  Age:         69 year old   [place] MRN:    [id number] Telephone Information:  Home Phone [phone]   Mobile [phone]       Address:  [address] [facility]  [place] [address] Email address:  [email or web address]      Emergency Contact(s)    Name Relationship Lgl Grd Work Phone Home Phone Mobile Phone   1. SRINATH HAILE Spouse  [phone]           Primary language:  English     needed? No   [place] Language Services:  [phone] op. 1  Other communication barriers:None    Preferred Method of Communication:  Phone  Current living arrangement: I live in a private home with spouse (Srinath, )    Mobility Status/ Medical Equipment: Independent        Health Maintenance  Health Maintenance Reviewed: Due/Overdue   Health Maintenance Due   Topic Date Due    COPD ACTION PLAN  Never done           My Access Plan  Medical Emergency 911   Primary Clinic Line [facility]* - [phone]   24 Hour Appointment Line [phone] or  [phone] (toll-free)   24 Hour Nurse Line [phone] (toll-free)   Preferred Urgent Care [facility], [phone]    "  Preferred Hospital [facility]  [phone]     Preferred Pharmacy [facility] #[id number] - [place], [place] - [address] [facility] [address] & [facility]     Behavioral Health Crisis Line The National Suicide Prevention Lifeline at 1-243.862.2153 or Text/Call 988           My Care Team Members  Patient Care Team         Relationship Specialty Notifications Start End    Leonardo Angela MD PCP - General Internal Medicine  7/17/10     Phone: [phone] Fax: [phone]         [address] [place] [address]    Leonardo Angela MD Assigned PCP   2/14/21     Phone: [phone] Fax: [phone]         [address] [place] [address]    Shikha Riley MD MD Pulmonary Disease  9/29/21     Phone: [phone] Fax: [phone]         [address] [facility] [address] [facility] [address]    Polo Moreau DPM Assigned Musculoskeletal Provider   8/12/23     Phone: [phone] Fax: [phone]         [address] [facility] [address] [facility] [address]    Starr Woodson, RN Lead Care Coordinator  Admissions 6/17/24     Melanie Sheehan CHW Community Health Worker Primary Care - CC Admissions 7/23/24     Phone: [phone]                     My Care Plans  Self Management and Treatment Plan    Care Plan  Care Plan: Complete health maintenance and care gaps       Problem: Lifestyle choices       Goal: Medical       Start Date: 9/14/2021 Expected End Date: 10/29/2024    This Visit's Progress: 90% Recent Progress: 100%    Note:     Goal Statement: I will complete my recommended overdue health maintenance/care gaps.      Strengths: Strong advocate for self  Patient expressed understanding of goal: Yes  Action steps to achieve this goal:  I will try to quit smoking; on 9/10/2021 \"cold turkey\" x 2 weeks; Started back smoking 10/2021. Cut back on how many daily.   I will follow up with my providers as scheduled/recommended.  I will take my medications as prescribed  4.   I " will discuss, review, schedule and complete recommended overdue health maintenance with my primary care provider  5.   I will contact my care team with questions, concerns, support needs  6.   I will use the clinic as a resource and I understand I can contact my clinic with 24/7 after hours services available                            Care Plan: Advance Care Plan       Problem: Patient does not have a valid Health Care Directive       Goal: Complete Health Care Directive       Start Date: 5/24/2023 Expected End Date: 2/17/2025    This Visit's Progress: 20% Recent Progress: 20%    Note:     Goal Statement: I will complete a Health Care Directive and have this scanned into my Medical Record.  Barriers: None  Strengths: Strong advocate for self  Patient expressed understanding of goal: yes  Action steps to achieve this goal:  Care Coordination will mail me a Health Care Directive and any requested resources (goals worksheets, education sheets, class flyer).   I will complete the Health Care Directive. My signature must be either notarized or witnessed by two adults who are not named as health care agents in the document. Additionally only one of the witnesses can be employed by my health care system. If I need a notary I can check with my bank, my place of Yazdanism, UPS stores, or look online at www.Solorein Technology.Continuum LLC .  I will provide a copy of my Health Care Directive to my care team and/or email directly to [email or web address].   I can visit [email or web address] website, email [email or web address] or call [facility] [employer]billionideas at [phone] (M-Friday 6a to 5p) for more information including free classes on completing a Health Care Directive.  I will contact my care team with any barriers, questions or assistance needed with this goal. Care coordinator will remain available as needed.                               Action Plans on File:                       Advance Care  Plans/Directives:   Advanced Care Plan/Directives on file: No    Discussed with patient/caregiver(s): No data recorded             My Medical and Care Information  Problem List   Patient Active Problem List   Diagnosis    Tobacco use disorder    ACP (advance care planning)    COPD (chronic obstructive pulmonary disease) (H)    Hypothyroidism, unspecified type    Hyperlipidemia LDL goal <100    Pancreatic cyst    Positive OCTAVIANO (antinuclear antibody)    Uveitis    Pulmonary nodules      Current Medications:  Please refer to the most recent medication list provided to you by your medical team and reach out to your provider with any questions or to make any corrections.    Care Coordination Start Date: 9/14/2021   Frequency of Care Coordination: monthly, more frequently as needed     Form Last Updated: 11/25/2024

## 2024-11-25 NOTE — PROGRESS NOTES
"Clinic Care Coordination Contact  Care Coordination Clinician Chart Review    Situation: Patient chart reviewed by Care Coordinator.       Background: Care Coordination Program started: 9/14/2021. Initial assessment completed and patient-centered care plan(s) were developed with participation from patient. Lead CC handed patient off to CHW for continued outreaches.       Assessment: Per chart review, patient outreach completed by CC CHW on 11/18/24.  Patient is actively working to accomplish goal(s). Patient's goal(s) appropriate and relevant at this time. Patient is due for updated Plan of Care.  Assessments will be completed annually or as needed/with change of patient status.      Care Plan: Complete health maintenance and care gaps       Problem: Lifestyle choices       Goal: Medical       Start Date: 9/14/2021 Expected End Date: 10/29/2024    This Visit's Progress: 90% Recent Progress: 100%    Note:     Goal Statement: I will complete my recommended overdue health maintenance/care gaps.      Strengths: Strong advocate for self  Patient expressed understanding of goal: Yes  Action steps to achieve this goal:  I will try to quit smoking; on 9/10/2021 \"cold turkey\" x 2 weeks; Started back smoking 10/2021. Cut back on how many daily.   I will follow up with my providers as scheduled/recommended.  I will take my medications as prescribed  4.   I will discuss, review, schedule and complete recommended overdue health maintenance with my primary care provider  5.   I will contact my care team with questions, concerns, support needs  6.   I will use the clinic as a resource and I understand I can contact my clinic with 24/7 after hours services available                            Care Plan: Advance Care Plan       Problem: Patient does not have a valid Health Care Directive       Goal: Complete Health Care Directive       Start Date: 5/24/2023 Expected End Date: 2/17/2025    This Visit's Progress: 20% Recent Progress: " 20%    Note:     Goal Statement: I will complete a Health Care Directive and have this scanned into my Medical Record.  Barriers: None  Strengths: Strong advocate for self  Patient expressed understanding of goal: yes  Action steps to achieve this goal:  Care Coordination will mail me a Health Care Directive and any requested resources (goals worksheets, education sheets, class flyer).   I will complete the Health Care Directive. My signature must be either notarized or witnessed by two adults who are not named as health care agents in the document. Additionally only one of the witnesses can be employed by my health care system. If I need a notary I can check with my bank, my place of Buddhist, [employer] stores, or look online at www.AsesoriÂ­as [email or web address] (Digital Advisors) .  I will provide a copy of my Health Care Directive to my care team and/or email directly to [email or web address].   I can visit [email or web address] website, email [email or web address] or call [facility] at [phone] (M-Friday 6a to 5p) for more information including free classes on completing a Health Care Directive.  I will contact my care team with any barriers, questions or assistance needed with this goal. Care coordinator will remain available as needed.                                  Plan/Recommendations: The patient will continue working with Care Coordination to achieve goal(s) as above. CHW will continue outreaches at minimum every 30 days and will involve Lead CC as needed or if patient is ready to move to Maintenance. Lead CC will continue to monitor CHW outreaches and patient's progress to goal(s) every 6 weeks.     Plan of Care updated and sent to patient: Yes, via Jasmeetharshawn Woodson RN Clinic Care Coordinator  [facility] Clinics: [place], [place] (on-site Wednesdays), [facility] (on-site Thursdays) & [facility].  [email or web address]  Phone: [phone]

## 2024-12-18 DIAGNOSIS — R05.9 COUGH, UNSPECIFIED TYPE: ICD-10-CM

## 2024-12-18 NOTE — TELEPHONE ENCOUNTER
Clinic RN: Please investigate patient's chart or contact patient if the information cannot be found because patient should have run out of this medication on 9/4/24. Confirm patient is taking this medication as prescribed. Document findings and route refill encounter to provider for approval or denial.  Yasmin Head RN, BSN  [facility]

## 2025-01-06 ENCOUNTER — PATIENT OUTREACH (OUTPATIENT)
Dept: CARE COORDINATION | Facility: CLINIC | Age: 70
End: 2025-01-06
Payer: COMMERCIAL

## 2025-01-06 NOTE — LETTER
[facility] CARE COORDINATION  [address] [facility] [address]    January 6, 2025        Irene Haile  [address] [facility] [address]          Dear [place],     Attached is an updated Patient Centered Plan of Care for your continued enrollment in Care Coordination. Please let us know if you have additional questions, concerns, or goals that we can assist with.    Sincerely,    Starr Woodson, RN Clinic Care Coordinator  [facility] Clinics: [place], [place] (on-site Wednesdays), [facility] & [facility].  [email or web address]  Phone: [phone]               [facility]  Patient Centered Plan of Care  About Me:        Patient Name:  Irene Haile    YOB: 1955  Age:         69 year old   [place] MRN:    [id number] Telephone Information:  Home Phone [phone]   Mobile [phone]       Address:  [address] [facility] [address] Email address:  [email or web address]      Emergency Contact(s)    Name Relationship Lgl Grd Work Phone Home Phone Mobile Phone   SRINATH ESCOBEDO Spouse  [phone]           Primary language:  English     needed? No   [place] [employer]:  [phone] op. 1  Other communication barriers:None    Preferred Method of Communication:  Phone  Current living arrangement: I live in a private home with spouse (Srinath, )    Mobility Status/ Medical Equipment: Independent        Health Maintenance  Health Maintenance Reviewed: Due/Overdue   Health Maintenance Due   Topic Date Due    COPD ACTION PLAN  Never done    COVID-19 Vaccine (6 - 2024-25 season) 12/03/2024    PHQ-2 (once per calendar year)  01/01/2025          My Access Plan  Medical Emergency 911   Primary Clinic Line [facility]* - [phone]   24 Hour Appointment Line [phone] or  [phone]) (toll-free)   24 Hour Nurse Line  "[phone] (toll-free)   Preferred Urgent Care [facility], [phone]     Preferred Hospital [facility]  [phone]     Preferred Pharmacy [id number] - [facility] [address] [facility] [address] [facility]     Behavioral Health Crisis Line The National Suicide Prevention Lifeline at 1-317.489.1884 or Text/Call 988           My Care Team Members  Patient Care Team         Relationship Specialty Notifications Start End    Leonardo Angela MD PCP - General Internal Medicine  7/17/10     Phone: [phone] Fax: [phone]         [address] [place] [address]    Leonardo Angela MD Assigned PCP   2/14/21     Phone: [phone] Fax: [phone]         [address] [place] [address]    Shikha Riley MD MD Pulmonary Disease  9/29/21     Phone: [phone] Fax: [phone]         [address] [facility] [address]    Polo Moreau DPM Assigned Musculoskeletal Provider   8/12/23     Phone: [phone] Fax: [phone]         [address] [facility] [address]    Starr Woodson RN Lead Care Coordinator  Admissions 6/17/24     Melanie Sheehan CHW Community Health Worker Primary Care - CC Admissions 7/23/24     Phone: [phone]                     My Care Plans  Self Management and Treatment Plan    Care Plan  Care Plan: Complete health maintenance and care gaps       Problem: Lifestyle choices       Goal: Medical       Start Date: 9/14/2021 Expected End Date: 10/29/2024    This Visit's Progress: 90% Recent Progress: 100%    Note:     Goal Statement: I will complete my recommended overdue health maintenance/care gaps.      Strengths: Strong advocate for self  Patient expressed understanding of goal: Yes  Action steps to achieve this goal:  I will try to quit smoking; on 9/10/2021 \"cold turkey\" x 2 weeks; Started back smoking 10/2021. Cut back on how many daily.   I will follow " up with my providers as scheduled/recommended.  I will take my medications as prescribed  4.   I will discuss, review, schedule and complete recommended overdue health maintenance with my primary care provider  5.   I will contact my care team with questions, concerns, support needs  6.   I will use the clinic as a resource and I understand I can contact my clinic with 24/7 after hours services available                            Care Plan: Advance Care Plan       Problem: Patient does not have a valid Health Care Directive       Goal: Complete Health Care Directive       Start Date: 5/24/2023 Expected End Date: 2/17/2025    This Visit's Progress: 20% Recent Progress: 20%    Note:     Goal Statement: I will complete a Health Care Directive and have this scanned into my Medical Record.  Barriers: None  Strengths: Strong advocate for self  Patient expressed understanding of goal: yes  Action steps to achieve this goal:  Care Coordination will mail me a Health Care Directive and any requested resources (goals worksheets, education sheets, class flyer).   I will complete the Health Care Directive. My signature must be either notarized or witnessed by two adults who are not named as health care agents in the document. Additionally only one of the witnesses can be employed by my health care system. If I need a notary I can check with my bank, my place of Sabianism, [employer] stores, or look online at [email or web address] .  I will provide a copy of my Health Care Directive to my care team and/or email directly to [email or web address].   I can visit [email or web address] website, email [email or web address] or call [facility] at [phone] (M-Friday 6a to 5p) for more information including free classes on completing a Health Care Directive.  I will contact my care team with any barriers, questions or assistance needed with this goal. Care coordinator will remain available as needed.                                Action Plans on File:                       Advance Care Plans/Directives:   Advanced Care Plan/Directives on file: No    Discussed with patient/caregiver(s): No data recorded             My Medical and Care Information  Problem List   Patient Active Problem List   Diagnosis    Tobacco use disorder    ACP (advance care planning)    COPD (chronic obstructive pulmonary disease) (H)    Hypothyroidism, unspecified type    Hyperlipidemia LDL goal <100    Pancreatic cyst    Positive OCTAVIANO (antinuclear antibody)    Uveitis    Pulmonary nodules      Current Medications:  Please refer to the most recent medication list provided to you by your medical team and reach out to your provider with any questions or to make any corrections.    Care Coordination Start Date: 9/14/2021   Frequency of Care Coordination: monthly, more frequently as needed     Form Last Updated: 01/06/2025

## 2025-01-06 NOTE — PROGRESS NOTES
Clinic Care Coordination Contact    Situation: Patient chart reviewed by care coordinator.    Background: Care Coordination initial assessment and enrollment to Care Coordination was 9/14/2021. Patient centered goal(s) developed with participation from patient.  RN CC handed patient off to CHW for continued outreach every 30 days. Patient is due for an updated complex care plan. Annual Assessment will be due November 2025.     Assessment: CHW CC is in process of outreaching to patient, recently unable to contact patient 12/27/24. Next CHW CC outreach attempt scheduled for 1/8/25.     Plan/Recommendations: CHW CC will route request to RNCC to review for disenrollment per standard work if next outreach attempt is unsuccessful. RNCC will extend and complete clinical review after CHW CC has made contact with patient. RNCC will remain available as needed.     Starr Woodson, RN Clinic Care Coordinator  [facility] Clinics: [place], [place] (on-site Wednesdays), [facility] (on-site Thursdays) & [facility].  [email or web address]  Phone: [phone]

## 2025-01-11 NOTE — TELEPHONE ENCOUNTER
Spoke with patient.  Medication list had indicated she has stopped this medication previously.  Patient tried taking it again a month ago to see if it would help with nighttime cough.  States it has not made a difference at all.  Therefore patient does not need omeprazole as she denies any GERD or abdominal symptoms prior to starting the PPI and denies them currently.  Instructed patient to instead try taking loratadine 10 mg daily for couple weeks to see if cough improves as patient does not notice postnasal drainage at night when the cough occurs.  Patient currently smoking also encouraged her to discontinue all tobacco use.  If cough not better in a couple weeks with antihistamine therapy and still having postnasal drainage, she will let me know and we will then consider ipratropium nasal spray.  If not having nasal drainage with still having cough, will address more as appropriate.  Omeprazole refill declined since not needed

## 2025-01-20 ENCOUNTER — PATIENT OUTREACH (OUTPATIENT)
Dept: CARE COORDINATION | Facility: CLINIC | Age: 70
End: 2025-01-20
Payer: COMMERCIAL

## 2025-01-20 NOTE — PROGRESS NOTES
"Clinic Care Coordination Contact  Community Health Worker Follow Up    Care Gaps:     Health Maintenance Due   Topic Date Due    COPD ACTION PLAN  Never done    COVID-19 Vaccine (6 - 2024-25 season) 12/03/2024    PHQ-2 (once per calendar year)  01/01/2025       Did Not Address    Care Plan:   Care Plan: Complete health maintenance and care gaps       Problem: Lifestyle choices       Goal: Medical       Start Date: 9/14/2021 Expected End Date: 10/29/2024    This Visit's Progress: 100% Recent Progress: 90%    Note:     Goal Statement: I will complete my recommended overdue health maintenance/care gaps.      Strengths: Strong advocate for self  Patient expressed understanding of goal: Yes  Action steps to achieve this goal:  I will try to quit smoking; on 9/10/2021 \"cold turkey\" x 2 weeks; Started back smoking 10/2021. Cut back on how many daily.   I will follow up with my providers as scheduled/recommended.  I will take my medications as prescribed  4.   I will discuss, review, schedule and complete recommended overdue health maintenance with my primary care provider  5.   I will contact my care team with questions, concerns, support needs  6.   I will use the clinic as a resource and I understand I can contact my clinic with 24/7 after hours services available                            Care Plan: Advance Care Plan       Problem: Patient does not have a valid Health Care Directive       Goal: Complete Health Care Directive       Start Date: 5/24/2023 Expected End Date: 4/22/2025    This Visit's Progress: 30% Recent Progress: 20%    Note:     Goal Statement: I will complete a Health Care Directive and have this scanned into my Medical Record.  Barriers: None  Strengths: Strong advocate for self  Patient expressed understanding of goal: yes  Action steps to achieve this goal:  Care Coordination will mail me a Health Care Directive and any requested resources (goals worksheets, education sheets, class flyer).   I will " complete the Health Care Directive. My signature must be either notarized or witnessed by two adults who are not named as health care agents in the document. Additionally only one of the witnesses can be employed by my health care system. If I need a notary I can check with my bank, my place of Jewish, [employer] stores, or look online at [email or web address] Medical Decisions .  I will provide a copy of my Health Care Directive to my care team and/or email directly to [email or web address].   I can visit [email or web address] website, email [email or web address] or call [facility] [place] at [phone] (M-Friday 6a to 5p) for more information including free classes on completing a Health Care Directive.  I will contact my care team with any barriers, questions or assistance needed with this goal. Care coordinator will remain available as needed.                           Discussed:  Patient stated she still is smoking, she has reduced her cigarette intake.  Patient stated she attends exercise classes on M & Th weekly.  Patient stated she has a new eye prescription.  Patient stated she has a few more upcoming eye appointments.  Patient stated she is driving.  Patient stated she is still working on the [employer] paperwork.    Intervention and Education during outreach:     CHW encouraged patient to contact CC if there are any other changes or resources needed.  Patient acknowledged understanding.      CHW Plan: will outreach in one month.    Melanie Sheehan, FERNANDA  Clinic Care Coordination  [facility] Clinics: [place], [place], [place], and [facility]  Phone: [phone]

## 2025-01-25 ENCOUNTER — OFFICE VISIT (OUTPATIENT)
Dept: URGENT CARE | Facility: URGENT CARE | Age: 70
End: 2025-01-25
Payer: COMMERCIAL

## 2025-01-25 VITALS
TEMPERATURE: 98.4 F | HEART RATE: 75 BPM | DIASTOLIC BLOOD PRESSURE: 74 MMHG | SYSTOLIC BLOOD PRESSURE: 123 MMHG | RESPIRATION RATE: 14 BRPM | OXYGEN SATURATION: 97 %

## 2025-01-25 DIAGNOSIS — J03.90 TONSILLITIS WITH EXUDATE: ICD-10-CM

## 2025-01-25 DIAGNOSIS — R39.9 UTI SYMPTOMS: ICD-10-CM

## 2025-01-25 DIAGNOSIS — R68.89 FLU-LIKE SYMPTOMS: ICD-10-CM

## 2025-01-25 DIAGNOSIS — H60.391 INFECTIVE OTITIS EXTERNA, RIGHT: ICD-10-CM

## 2025-01-25 DIAGNOSIS — H60.391 INFECTIVE OTITIS EXTERNA, RIGHT: Primary | ICD-10-CM

## 2025-01-25 DIAGNOSIS — M54.2 MUSCULOSKELETAL NECK PAIN: ICD-10-CM

## 2025-01-25 DIAGNOSIS — R68.83 CHILLS (WITHOUT FEVER): ICD-10-CM

## 2025-01-25 DIAGNOSIS — J03.90 TONSILLITIS WITH EXUDATE: Primary | ICD-10-CM

## 2025-01-25 DIAGNOSIS — H65.03 NON-RECURRENT ACUTE SEROUS OTITIS MEDIA OF BOTH EARS: ICD-10-CM

## 2025-01-25 LAB
ALBUMIN UR-MCNC: NEGATIVE MG/DL
APPEARANCE UR: CLEAR
BACTERIA #/AREA URNS HPF: ABNORMAL /HPF
BASOPHILS # BLD AUTO: 0 10E3/UL (ref 0–0.2)
BASOPHILS NFR BLD AUTO: 0 %
BILIRUB UR QL STRIP: NEGATIVE
COLOR UR AUTO: YELLOW
DEPRECATED S PYO AG THROAT QL EIA: NEGATIVE
EOSINOPHIL # BLD AUTO: 0.3 10E3/UL (ref 0–0.7)
EOSINOPHIL NFR BLD AUTO: 4 %
ERYTHROCYTE [DISTWIDTH] IN BLOOD BY AUTOMATED COUNT: 14.1 % (ref 10–15)
FLUAV AG SPEC QL IA: NEGATIVE
FLUBV AG SPEC QL IA: NEGATIVE
GLUCOSE UR STRIP-MCNC: NEGATIVE MG/DL
HCT VFR BLD AUTO: 44.1 % (ref 35–47)
HGB BLD-MCNC: 14.4 G/DL (ref 11.7–15.7)
HGB UR QL STRIP: ABNORMAL
IMM GRANULOCYTES # BLD: 0 10E3/UL
IMM GRANULOCYTES NFR BLD: 0 %
KETONES UR STRIP-MCNC: NEGATIVE MG/DL
LEUKOCYTE ESTERASE UR QL STRIP: ABNORMAL
LYMPHOCYTES # BLD AUTO: 2.7 10E3/UL (ref 0.8–5.3)
LYMPHOCYTES NFR BLD AUTO: 31 %
MCH RBC QN AUTO: 30.1 PG (ref 26.5–33)
MCHC RBC AUTO-ENTMCNC: 32.7 G/DL (ref 31.5–36.5)
MCV RBC AUTO: 92 FL (ref 78–100)
MONOCYTES # BLD AUTO: 1 10E3/UL (ref 0–1.3)
MONOCYTES NFR BLD AUTO: 12 %
NEUTROPHILS # BLD AUTO: 4.5 10E3/UL (ref 1.6–8.3)
NEUTROPHILS NFR BLD AUTO: 53 %
NITRATE UR QL: NEGATIVE
PH UR STRIP: 6 [PH] (ref 5–7)
PLATELET # BLD AUTO: 255 10E3/UL (ref 150–450)
RBC # BLD AUTO: 4.79 10E6/UL (ref 3.8–5.2)
RBC #/AREA URNS AUTO: ABNORMAL /HPF
SP GR UR STRIP: 1.01 (ref 1–1.03)
SQUAMOUS #/AREA URNS AUTO: ABNORMAL /LPF
UROBILINOGEN UR STRIP-ACNC: 0.2 E.U./DL
WBC # BLD AUTO: 8.5 10E3/UL (ref 4–11)
WBC #/AREA URNS AUTO: ABNORMAL /HPF

## 2025-01-25 PROCEDURE — 3078F DIAST BP <80 MM HG: CPT | Performed by: FAMILY MEDICINE

## 2025-01-25 PROCEDURE — 87635 SARS-COV-2 COVID-19 AMP PRB: CPT | Performed by: FAMILY MEDICINE

## 2025-01-25 PROCEDURE — 81001 URINALYSIS AUTO W/SCOPE: CPT | Performed by: FAMILY MEDICINE

## 2025-01-25 PROCEDURE — 36415 COLL VENOUS BLD VENIPUNCTURE: CPT | Performed by: FAMILY MEDICINE

## 2025-01-25 PROCEDURE — 3074F SYST BP LT 130 MM HG: CPT | Performed by: FAMILY MEDICINE

## 2025-01-25 PROCEDURE — 87804 INFLUENZA ASSAY W/OPTIC: CPT | Performed by: FAMILY MEDICINE

## 2025-01-25 PROCEDURE — 85025 COMPLETE CBC W/AUTO DIFF WBC: CPT | Performed by: FAMILY MEDICINE

## 2025-01-25 PROCEDURE — 99215 OFFICE O/P EST HI 40 MIN: CPT | Performed by: FAMILY MEDICINE

## 2025-01-25 PROCEDURE — 87651 STREP A DNA AMP PROBE: CPT | Performed by: FAMILY MEDICINE

## 2025-01-25 RX ORDER — CEFDINIR 300 MG/1
300 CAPSULE ORAL 2 TIMES DAILY
Qty: 20 CAPSULE | Refills: 0 | Status: SHIPPED | OUTPATIENT
Start: 2025-01-25 | End: 2025-02-04

## 2025-01-25 RX ORDER — ALBUTEROL SULFATE 90 UG/1
2 INHALANT RESPIRATORY (INHALATION) EVERY 6 HOURS PRN
COMMUNITY
Start: 2025-01-25

## 2025-01-25 RX ORDER — FOLIC ACID 1 MG/1
1 TABLET ORAL DAILY
COMMUNITY
Start: 2024-06-10

## 2025-01-25 RX ORDER — DORZOLAMIDE HYDROCHLORIDE AND TIMOLOL MALEATE 20; 5 MG/ML; MG/ML
1 SOLUTION/ DROPS OPHTHALMIC 2 TIMES DAILY
COMMUNITY
Start: 2024-12-10

## 2025-01-25 RX ORDER — NEOMYCIN SULFATE, POLYMYXIN B SULFATE AND HYDROCORTISONE 10; 3.5; 1 MG/ML; MG/ML; [USP'U]/ML
3 SUSPENSION/ DROPS AURICULAR (OTIC) 3 TIMES DAILY
Qty: 10 ML | Refills: 0 | Status: SHIPPED | OUTPATIENT
Start: 2025-01-25 | End: 2025-02-01

## 2025-01-25 NOTE — PATIENT INSTRUCTIONS
Start cefdinir 2 times a day for 10 days for tonsillitis    Start Cortisporin drops to right ear for ear canal infection 3 times a day for 7 days    For neck pain-    For pain    Start    Acetaminophen (Tylenol ) 1000 mg 3 times a day for the next 2 to 3  days until pain resolves-maximum dose of acetaminophen (tylenol)  is 3000 mg in 24-hours     If increase in pain before next dose of tylenol      take Ibuprofen (motrin/advil)  400 mg 3 times a day always take with food for the next 2  to  3 days until pain resolves-maximum dose of ibuprofen is 1200  mg in 24 hours       If your COVID test is positive, please call the nurse line-the phone number is in the upper right hand corner of you after visit summary handout given to you at the end of your appointment,    Tell the nurse line your provider in urgent care said you are a candidate for Paxlovid to treat your COVID based on your medical history   Your day zero of onset of symptoms 1/21/25       to ER if symptoms worsen     Follow up with your primary care provider or clinic in about 2-3 days  if your symptoms do not improve

## 2025-01-25 NOTE — PROGRESS NOTES
ASSESSMENT/PLAN:      ICD-10-CM    1. Infective otitis externa, right  H60.391 neomycin-polymyxin-hydrocortisone (CORTISPORIN) 3.5-34896-4 otic suspension      2. Tonsillitis with exudate  J03.90 Streptococcus A Rapid Screen w/Reflex to PCR - Clinic Collect     Group A Streptococcus PCR Throat Swab     cefdinir (OMNICEF) 300 MG capsule      3. Non-recurrent acute serous otitis media of both ears  H65.03       4. Musculoskeletal neck pain  M54.2       5. Chills (without fever)  R68.83 UA Macroscopic with reflex to Microscopic and Culture - Clinic Collect     UA Microscopic with Reflex to Culture     CBC with platelets and differential     CBC with platelets and differential      6. UTI symptoms  R39.9 UA Macroscopic with reflex to Microscopic and Culture - Clinic Collect     UA Microscopic with Reflex to Culture      7. Flu-like symptoms  R68.89 COVID-19 Virus (Coronavirus) by PCR Nasopharyngeal     Influenza A & B Antigen - Clinic Collect     CBC with platelets and differential     CBC with platelets and differential          Patient Instructions     Start cefdinir 2 times a day for 10 days for tonsillitis    Start Cortisporin drops to right ear for ear canal infection 3 times a day for 7 days    For neck pain-    For pain    Start    Acetaminophen (Tylenol ) 1000 mg 3 times a day for the next 2 to 3  days until pain resolves-maximum dose of acetaminophen (tylenol)  is 3000 mg in 24-hours     If increase in pain before next dose of tylenol      take Ibuprofen (motrin/advil)  400 mg 3 times a day always take with food for the next 2  to  3 days until pain resolves-maximum dose of ibuprofen is 1200  mg in 24 hours       If your COVID test is positive, please call the nurse line-the phone number is in the upper right hand corner of you after visit summary handout given to you at the end of your appointment,    Tell the nurse line your provider in urgent care said you are a candidate for Paxlovid to treat your COVID  based on your medical history   Your day zero of onset of symptoms 1/21/25       to ER if symptoms worsen     Follow up with your primary care provider or clinic in about 2-3 days  if your symptoms do not improve                              Reviewed medication instructions and side effects. Follow up if experiences side effects.     I reviewed supportive care, otc meds to use if needed, expected course, and signs of concern.  Follow up as needed or if she does not improve within  1-2 days or if worsens in any way.  Reviewed red flag symptoms and is to go to the ER if experiences any of these.     The use of Dragon/PowerMic dictation services may have been used to construct the content in this note; any grammatical or spelling errors are non-intentional. Please contact the author of this note directly if you are in need of any clarification.      On the day of the encounter, time spend on chart review, patient visit, review of testing, documentation was *** minutes              Patient presents with:  Urgent Care: Bilateral ear pain- x 3-4 days, neck stiffness       Subjective     Irene Haile is a 69 year old female who presents to clinic today for the following health issues:    HPI     {UC Conditions (Optional):541309}  {additional problems for provider to add (Optional): 273461}  {ACUTE SUPERLIST - extended history:052926}    Past Medical History:   Diagnosis Date    Axillary adenopathy 06/05/2013    COPD (chronic obstructive pulmonary disease) (H) 06/05/2013    Hypothyroidism, unspecified type 12/30/2017    Personal history of tobacco use, presenting hazards to health     Pulmonary hemorrhage 06/05/2013    UTI (urinary tract infection)      Social History     Tobacco Use    Smoking status: Some Days     Current packs/day: 0.00     Average packs/day: 0.8 packs/day for 30.0 years (22.5 ttl pk-yrs)     Types: Cigarettes     Start date: 9/10/1991     Last attempt to quit: 9/10/2021     Years since quitting:  3.3    Smokeless tobacco: Never    Tobacco comments:     Very hard to quit since Covid in 2020   Substance Use Topics    Alcohol use: No       Current Outpatient Medications   Medication Sig Dispense Refill    albuterol (PROAIR HFA/PROVENTIL HFA/VENTOLIN HFA) 108 (90 Base) MCG/ACT inhaler Inhale 2 puffs into the lungs every 6 hours as needed for shortness of breath, wheezing or cough.      cefdinir (OMNICEF) 300 MG capsule Take 1 capsule (300 mg) by mouth 2 times daily for 10 days. 20 capsule 0    dorzolamide-timolol (COSOPT) 2-0.5 % ophthalmic solution Place 1 drop into both eyes 2 times daily.      folic acid (FOLVITE) 1 MG tablet Take 1 tablet by mouth daily.      levothyroxine (SYNTHROID/LEVOTHROID) 100 MCG tablet TAKE 1 TABLET(100 MCG) BY MOUTH DAILY 90 tablet 1    methotrexate 2.5 MG tablet 15 mg Usually on Mondays.      neomycin-polymyxin-hydrocortisone (CORTISPORIN) 3.5-90285-7 otic suspension Place 3 drops into the right ear 3 times daily for 7 days. 10 mL 0    omeprazole (PRILOSEC) 20 MG DR capsule Take 1 capsule (20 mg) by mouth daily as needed.      rosuvastatin (CRESTOR) 10 MG tablet TAKE 1 TABLET(10 MG) BY MOUTH DAILY 90 tablet 2    umeclidinium-vilanterol (ANORO ELLIPTA) 62.5-25 MCG/ACT oral inhaler INHALE 1 PUFF INTO THE LUNGS DAILY 120 each 1     Allergies   Allergen Reactions    Amoxicillin-Pot Clavulanate       Vomiting/nausea    Atorvastatin      Muscle achiness      Augmented Betamethasone Diprop [Betamethasone] GI Disturbance    Codeine      Insomnia      Nitrofurantoin      Nausea, loss appetite    Shingrix [Zoster Vac Recomb Adjuvanted]      Severe body pain for 3-4 weeks             ROS are negative, except as otherwise noted HPI      Objective    /74 (BP Location: Right arm, Patient Position: Sitting, Cuff Size: Adult Regular)   Pulse 75   Temp 98.4  F (36.9  C) (Tympanic)   Resp 14   LMP  (LMP Unknown)   SpO2 97%   There is no height or weight on file to calculate BMI.  Physical  Exam   {Exam List (Optional):978358}  {O PHRASES:204151}     Diagnostic Test Results:  Labs reviewed in Epic  Results for orders placed or performed in visit on 01/25/25   UA Macroscopic with reflex to Microscopic and Culture - Clinic Collect     Status: Abnormal    Specimen: Urine, Clean Catch   Result Value Ref Range    Color Urine Yellow Colorless, Straw, Light Yellow, Yellow    Appearance Urine Clear Clear    Glucose Urine Negative Negative mg/dL    Bilirubin Urine Negative Negative    Ketones Urine Negative Negative mg/dL    Specific Gravity Urine 1.015 1.003 - 1.035    Blood Urine Trace (A) Negative    pH Urine 6.0 5.0 - 7.0    Protein Albumin Urine Negative Negative mg/dL    Urobilinogen Urine 0.2 0.2, 1.0 E.U./dL    Nitrite Urine Negative Negative    Leukocyte Esterase Urine Small (A) Negative   UA Microscopic with Reflex to Culture     Status: Abnormal   Result Value Ref Range    Bacteria Urine Few (A) None Seen /HPF    RBC Urine 0-2 0-2 /HPF /HPF    WBC Urine 0-5 0-5 /HPF /HPF    Squamous Epithelials Urine Moderate (A) None Seen /LPF    Narrative    Urine Culture not indicated   CBC with platelets and differential     Status: None   Result Value Ref Range    WBC Count 8.5 4.0 - 11.0 10e3/uL    RBC Count 4.79 3.80 - 5.20 10e6/uL    Hemoglobin 14.4 11.7 - 15.7 g/dL    Hematocrit 44.1 35.0 - 47.0 %    MCV 92 78 - 100 fL    MCH 30.1 26.5 - 33.0 pg    MCHC 32.7 31.5 - 36.5 g/dL    RDW 14.1 10.0 - 15.0 %    Platelet Count 255 150 - 450 10e3/uL    % Neutrophils 53 %    % Lymphocytes 31 %    % Monocytes 12 %    % Eosinophils 4 %    % Basophils 0 %    % Immature Granulocytes 0 %    Absolute Neutrophils 4.5 1.6 - 8.3 10e3/uL    Absolute Lymphocytes 2.7 0.8 - 5.3 10e3/uL    Absolute Monocytes 1.0 0.0 - 1.3 10e3/uL    Absolute Eosinophils 0.3 0.0 - 0.7 10e3/uL    Absolute Basophils 0.0 0.0 - 0.2 10e3/uL    Absolute Immature Granulocytes 0.0 <=0.4 10e3/uL   Streptococcus A Rapid Screen w/Reflex to PCR - Clinic Collect      Status: Normal    Specimen: Throat; Swab   Result Value Ref Range    Group A Strep antigen Negative Negative   Influenza A & B Antigen - Clinic Collect     Status: Normal    Specimen: Nasopharyngeal; Swab   Result Value Ref Range    Influenza A antigen Negative Negative    Influenza B antigen Negative Negative    Narrative    Test results must be correlated with clinical data. If necessary, results should be confirmed by a molecular assay or viral culture.   Group A Streptococcus PCR Throat Swab     Status: Normal    Specimen: Throat; Swab   Result Value Ref Range    Group A strep by PCR Not Detected Not Detected    Narrative    The Xpert Xpress Strep A test, performed on the [employer], is a rapid, qualitative in vitro diagnostic test for the detection of Streptococcus pyogenes (Group A ß-hemolytic Streptococcus, Strep A) in throat swab specimens from patients with signs and symptoms of pharyngitis. The Xpert Xpress Strep A test can be used as an aid in the diagnosis of Group A Streptococcal pharyngitis. The assay is not intended to monitor treatment for Group A Streptococcus infections. The Xpert Xpress Strep A test utilizes an automated real-time polymerase chain reaction (PCR) to detect Streptococcus pyogenes DNA.   CBC with platelets and differential     Status: None    Narrative    The following orders were created for panel order CBC with platelets and differential.  Procedure                               Abnormality         Status                     ---------                               -----------         ------                     CBC with platelets and d...[[id number]]                      Final result                 Please view results for these tests on the individual orders.                         % Neutrophils 53 %    % Lymphocytes 31 %    % Monocytes 12 %    % Eosinophils 4 %    % Basophils 0 %    % Immature Granulocytes 0 %    Absolute Neutrophils 4.5 1.6 - 8.3 10e3/uL    Absolute Lymphocytes 2.7 0.8 - 5.3 10e3/uL    Absolute Monocytes 1.0 0.0 - 1.3 10e3/uL    Absolute Eosinophils 0.3 0.0 - 0.7 10e3/uL    Absolute Basophils 0.0 0.0 - 0.2 10e3/uL    Absolute Immature Granulocytes 0.0 <=0.4 10e3/uL   Streptococcus A Rapid Screen w/Reflex to PCR - Clinic Collect     Status: Normal    Specimen: Throat; Swab   Result Value Ref Range    Group A Strep antigen Negative Negative   Influenza A & B Antigen - Clinic Collect     Status: Normal    Specimen: Nasopharyngeal; Swab   Result Value Ref Range    Influenza A antigen Negative Negative    Influenza B antigen Negative Negative    Narrative    Test results must be correlated with clinical data. If necessary, results should be confirmed by a molecular assay or viral culture.   Group A Streptococcus PCR Throat Swab     Status: Normal    Specimen: Throat; Swab   Result Value Ref Range    Group A strep by PCR Not Detected Not Detected    Narrative    The Xpert Xpress Strep A test, performed on the [employer], is a rapid, qualitative in vitro diagnostic test for the detection of Streptococcus pyogenes (Group A ß-hemolytic Streptococcus, Strep A) in throat swab specimens from patients with signs and symptoms of pharyngitis. The Xpert Xpress Strep A test can be used as an aid in the diagnosis of Group A Streptococcal pharyngitis. The assay is not intended to monitor treatment for Group A Streptococcus infections. The Xpert Xpress Strep A test utilizes an automated real-time polymerase chain reaction (PCR) to detect Streptococcus pyogenes DNA.   CBC with platelets and differential     Status: None    Narrative    The following orders were created for panel order CBC with platelets and differential.  Procedure                               Abnormality          Status                     ---------                               -----------         ------                     CBC with platelets and d...[[id number]]                      Final result                 Please view results for these tests on the individual orders.

## 2025-01-26 LAB — S PYO DNA THROAT QL NAA+PROBE: NOT DETECTED

## 2025-01-27 LAB — SARS-COV-2 RNA RESP QL NAA+PROBE: NEGATIVE

## 2025-02-05 ENCOUNTER — TRANSFERRED RECORDS (OUTPATIENT)
Dept: HEALTH INFORMATION MANAGEMENT | Facility: CLINIC | Age: 70
End: 2025-02-05
Payer: COMMERCIAL

## 2025-02-16 DIAGNOSIS — E78.5 HYPERLIPIDEMIA LDL GOAL <100: ICD-10-CM

## 2025-02-17 ENCOUNTER — PATIENT OUTREACH (OUTPATIENT)
Dept: CARE COORDINATION | Facility: CLINIC | Age: 70
End: 2025-02-17
Payer: COMMERCIAL

## 2025-02-17 RX ORDER — ROSUVASTATIN CALCIUM 10 MG/1
10 TABLET, COATED ORAL DAILY
Qty: 90 TABLET | Refills: 0 | Status: SHIPPED | OUTPATIENT
Start: 2025-02-17

## 2025-02-17 NOTE — PROGRESS NOTES
"Clinic Care Coordination Contact  Care Coordination Clinician Chart Review    Situation: Patient chart reviewed by Care Coordinator.       Background: Care Coordination Program started: 9/14/2021. Initial assessment completed and patient-centered care plan(s) were developed with participation from patient. Lead CC handed patient off to CHW for continued outreaches.       Assessment: Per chart review, patient outreach completed by CC CHW on 1/20/25.  Patient is actively working to accomplish goal(s). Patient's goal(s) appropriate and relevant at this time. Patient is not due for updated Plan of Care.  Assessments will be completed annually or as needed/with change of patient status.      Care Plan: Complete health maintenance and care gaps       Problem: Lifestyle choices       Goal: Medical       Start Date: 9/14/2021 Expected End Date: 10/29/2024    This Visit's Progress: 90% Recent Progress: 100%    Note:     Goal Statement: I will complete my recommended overdue health maintenance/care gaps.      Strengths: Strong advocate for self  Patient expressed understanding of goal: Yes  Action steps to achieve this goal:  I will try to quit smoking; on 9/10/2021 \"cold turkey\" x 2 weeks; Started back smoking 10/2021. Cut back on how many daily.   I will follow up with my providers as scheduled/recommended.  I will take my medications as prescribed  4.   I will discuss, review, schedule and complete recommended overdue health maintenance with my primary care provider  5.   I will contact my care team with questions, concerns, support needs  6.   I will use the clinic as a resource and I understand I can contact my clinic with 24/7 after hours services available                            Care Plan: Advance Care Plan       Problem: Patient does not have a valid Health Care Directive       Goal: Complete Health Care Directive       Start Date: 5/24/2023 Expected End Date: 4/22/2025    This Visit's Progress: 30% Recent " Progress: 20%    Note:     Goal Statement: I will complete a Health Care Directive and have this scanned into my Medical Record.  Barriers: None  Strengths: Strong advocate for self  Patient expressed understanding of goal: yes  Action steps to achieve this goal:  Care Coordination will mail me a Health Care Directive and any requested resources (goals worksheets, education sheets, class flyer).   I will complete the Health Care Directive. My signature must be either notarized or witnessed by two adults who are not named as health care agents in the document. Additionally only one of the witnesses can be employed by my health care system. If I need a notary I can check with my bank, my place of Church, [employer] stores, or look online at [email or web address] .  I will provide a copy of my Health Care Directive to my care team and/or email directly to [email or web address].   I can visit [email or web address], email [email or web address] or call [facility] [employer] at [phone] (M-Friday 6a to 5p) for more information including free classes on completing a Health Care Directive.  I will contact my care team with any barriers, questions or assistance needed with this goal. Care coordinator will remain available as needed.                                  Plan/Recommendations: The patient will continue working with Care Coordination to achieve goal(s) as above. CHW will continue outreaches at minimum every 30 days and will involve Lead CC as needed or if patient is ready to move to Maintenance. Lead CC will continue to monitor CHW outreaches and patient's progress to goal(s) every 6 weeks.     Plan of Care updated and sent to patient: No.    Starr Woodson, RN Clinic Care Coordinator  [facility] Clinics: [place], [place] (on-site Wednesdays), [facility] (on-site Thursdays) & [facility].  [email or web address]  Phone: [phone]

## 2025-02-20 ENCOUNTER — PATIENT OUTREACH (OUTPATIENT)
Dept: CARE COORDINATION | Facility: CLINIC | Age: 70
End: 2025-02-20
Payer: COMMERCIAL

## 2025-03-04 ENCOUNTER — PATIENT OUTREACH (OUTPATIENT)
Dept: CARE COORDINATION | Facility: CLINIC | Age: 70
End: 2025-03-04
Payer: COMMERCIAL

## 2025-03-04 NOTE — PROGRESS NOTES
Clinic Care Coordination Contact  [facility]/Voicemail    Clinical Data: Care Coordinator Outreach    Outreach Documentation Number of Outreach Attempt   3/4/2025  11:35 AM 1       Left message on patient's voicemail with call back information and requested return call.      Plan: Care Coordinator will try to reach patient again in 10 business days.    FERNANDA Mixon  Clinic Care Coordination  [facility] Clinics: [place], [place], [facility], and [place] [facility]  Phone: [phone]

## 2025-03-25 ENCOUNTER — PATIENT OUTREACH (OUTPATIENT)
Dept: CARE COORDINATION | Facility: CLINIC | Age: 70
End: 2025-03-25
Payer: COMMERCIAL

## 2025-03-25 NOTE — LETTER
[facility] CARE [facility]ORDIN[facility]  [address] [facility] [address]     March 26, 2025    Irene Haile  [address]  [facility] [address]      Dear Myrna,    I have been attempting to reach you since our last contact. I would like to continue to work with you and provide any additional support you may need on achieving your health care related goals. I would appreciate if you would give me a call at [phone] to let me know if you would like to continue working together. I know that there are many things that can affect our ability to communicate and I hope we can continue to work together.    We are focused on providing you with the highest-quality healthcare experience possible.    Sincerely,    Marichuy Andrade RN

## 2025-03-25 NOTE — PROGRESS NOTES
Clinic Care Coordination Contact  [facility]/Voicemail    Clinical Data: Care Coordinator Outreach    Outreach Documentation Number of Outreach Attempt   3/4/2025  11:35 AM 1   3/25/2025   4:13 PM 2       Left message on patient's voicemail with call back information and requested return call.      Plan:  Care Coordinator will try to reach patient again in 10 business days.    FERNANDA Mixon  Clinic Care Coordination  [facility] Clinics: [place], and [place] [facility]  Phone: [phone]

## 2025-03-26 NOTE — PROGRESS NOTES
Clinic Care Coordination Contact    Situation: Patient chart reviewed by care coordinator.    Background: Patient is enrolled in clinic care coordination and followed to assist with goal(s) progression. CHW CC routed chart to RNCC for review to determine eligibility of disenrolling from Care Coordination per standard work.     Assessment: Per Chart Review, patient has been unreachable for follow up x 2 attempts with no further engagement or return calls.     Plan/Recommendations: RNCC will send unable to contact letter to patient via [employer]. CHWCC to make 3rd/final attempt as scheduled/planned. If UTC and patient still has not returned calls/messages, okay for CHWCC to disenroll patient from Care Coordination. RNCC will remain available as needed.       Marichuy Andrade RN Care Coordinator  [facility] [place]  Email: [email or web address]  Phone: [phone]

## 2025-03-28 ENCOUNTER — ANCILLARY PROCEDURE (OUTPATIENT)
Dept: MAMMOGRAPHY | Facility: CLINIC | Age: 70
End: 2025-03-28
Attending: INTERNAL MEDICINE
Payer: COMMERCIAL

## 2025-03-28 DIAGNOSIS — Z12.31 VISIT FOR SCREENING MAMMOGRAM: ICD-10-CM

## 2025-03-28 PROCEDURE — 77063 BREAST TOMOSYNTHESIS BI: CPT | Mod: TC | Performed by: RADIOLOGY

## 2025-03-28 PROCEDURE — 77067 SCR MAMMO BI INCL CAD: CPT | Mod: TC | Performed by: RADIOLOGY

## 2025-04-10 ENCOUNTER — PATIENT OUTREACH (OUTPATIENT)
Dept: CARE COORDINATION | Facility: CLINIC | Age: 70
End: 2025-04-10
Payer: COMMERCIAL

## 2025-04-10 NOTE — LETTER
[facility]ORD[facility]  [address] [facility] [address]    April 10, 2025    Irene Haile  [address]  [facility] [address]      Dear Myrna,    I have been unsuccessful in reaching you since our last contact. At this time the Care Coordination team will make no further attempts to reach you, however this does not change your ability to continue receiving care from your providers at your primary care clinic. If you need additional support from a care coordinator in the future please contact me at [phone].    We are focused on providing you with the highest-quality healthcare experience possible.    Sincerely,    Melanie Sheehan, [facility]  Clinic Care Coordination  [facility] Clinics: [place], and [facility]  Phone: [phone]

## 2025-04-10 NOTE — PROGRESS NOTES
Clinic Care Coordination Contact  [facility]/Voicemail    Clinical Data: Care Coordinator Outreach    Outreach Documentation Number of Outreach Attempt   3/4/2025  11:35 AM 1   3/25/2025   4:13 PM 2   4/10/2025   1:18 PM 3       Left message on patient's voicemail with call back information and requested return call.      Plan: Care Coordinator will send disenrollment letter with care coordinator contact information via [employer]. Care Coordinator will do no further outreaches at this time.    FERNANDA Mixon  Clinic Care Coordination  [facility] Clinics: [place], [place], [place], and [place] [facility]  Phone: [phone]

## 2025-04-30 SDOH — HEALTH STABILITY: PHYSICAL HEALTH: ON AVERAGE, HOW MANY MINUTES DO YOU ENGAGE IN EXERCISE AT THIS LEVEL?: 60 MIN

## 2025-04-30 SDOH — HEALTH STABILITY: PHYSICAL HEALTH: ON AVERAGE, HOW MANY DAYS PER WEEK DO YOU ENGAGE IN MODERATE TO STRENUOUS EXERCISE (LIKE A BRISK WALK)?: 2 DAYS

## 2025-04-30 ASSESSMENT — SOCIAL DETERMINANTS OF HEALTH (SDOH): HOW OFTEN DO YOU GET TOGETHER WITH FRIENDS OR RELATIVES?: ONCE A WEEK

## 2025-05-05 ENCOUNTER — OFFICE VISIT (OUTPATIENT)
Dept: INTERNAL MEDICINE | Facility: CLINIC | Age: 70
End: 2025-05-05
Payer: COMMERCIAL

## 2025-05-05 VITALS
BODY MASS INDEX: 23.89 KG/M2 | HEIGHT: 67 IN | DIASTOLIC BLOOD PRESSURE: 79 MMHG | TEMPERATURE: 97.7 F | WEIGHT: 152.2 LBS | OXYGEN SATURATION: 98 % | SYSTOLIC BLOOD PRESSURE: 143 MMHG | HEART RATE: 72 BPM | RESPIRATION RATE: 16 BRPM

## 2025-05-05 DIAGNOSIS — Z00.00 MEDICARE ANNUAL WELLNESS VISIT, SUBSEQUENT: Primary | ICD-10-CM

## 2025-05-05 DIAGNOSIS — F17.200 TOBACCO USE DISORDER: ICD-10-CM

## 2025-05-05 DIAGNOSIS — R91.8 PULMONARY NODULES: ICD-10-CM

## 2025-05-05 DIAGNOSIS — E03.9 HYPOTHYROIDISM, UNSPECIFIED TYPE: ICD-10-CM

## 2025-05-05 DIAGNOSIS — H91.93 BILATERAL HEARING LOSS, UNSPECIFIED HEARING LOSS TYPE: ICD-10-CM

## 2025-05-05 DIAGNOSIS — Z23 NEED FOR COVID-19 VACCINE: ICD-10-CM

## 2025-05-05 DIAGNOSIS — K86.2 PANCREATIC CYST: ICD-10-CM

## 2025-05-05 DIAGNOSIS — H20.9 UVEITIS: ICD-10-CM

## 2025-05-05 DIAGNOSIS — J44.9 CHRONIC OBSTRUCTIVE PULMONARY DISEASE, UNSPECIFIED COPD TYPE (H): ICD-10-CM

## 2025-05-05 DIAGNOSIS — E78.5 HYPERLIPIDEMIA LDL GOAL <100: ICD-10-CM

## 2025-05-05 DIAGNOSIS — M81.0 OSTEOPOROSIS WITHOUT CURRENT PATHOLOGICAL FRACTURE, UNSPECIFIED OSTEOPOROSIS TYPE: ICD-10-CM

## 2025-05-05 PROCEDURE — 99214 OFFICE O/P EST MOD 30 MIN: CPT | Mod: 25 | Performed by: INTERNAL MEDICINE

## 2025-05-05 PROCEDURE — 3078F DIAST BP <80 MM HG: CPT | Performed by: INTERNAL MEDICINE

## 2025-05-05 PROCEDURE — 3077F SYST BP >= 140 MM HG: CPT | Performed by: INTERNAL MEDICINE

## 2025-05-05 PROCEDURE — G0439 PPPS, SUBSEQ VISIT: HCPCS | Performed by: INTERNAL MEDICINE

## 2025-05-05 PROCEDURE — 90480 ADMN SARSCOV2 VAC 1/ONLY CMP: CPT | Performed by: INTERNAL MEDICINE

## 2025-05-05 PROCEDURE — 91320 SARSCV2 VAC 30MCG TRS-SUC IM: CPT | Performed by: INTERNAL MEDICINE

## 2025-05-05 RX ORDER — ROSUVASTATIN CALCIUM 10 MG/1
10 TABLET, COATED ORAL DAILY
Qty: 90 TABLET | Refills: 0 | Status: CANCELLED | OUTPATIENT
Start: 2025-05-05

## 2025-05-05 RX ORDER — LEVOTHYROXINE SODIUM 100 UG/1
TABLET ORAL
Qty: 90 TABLET | Refills: 1 | Status: CANCELLED | OUTPATIENT
Start: 2025-05-05

## 2025-05-05 NOTE — PROGRESS NOTES
Preventive Care Visit  [facility]  Leonardo Angela MD, Internal Medicine  May 5, 2025       ASSESSMENT:   1. Medicare annual wellness visit, subsequent (Primary)  Needs healthcare directive.  Mammogram due again 3/29/2026 or later.  Patient declined Shingrix vaccination.  Will get COVID vaccination today.  Recommend updated COVID and influenza vaccinations in October.  Colonoscopy due 2029    2. Hyperlipidemia LDL goal <100  Previously controlled.  Continue statin for now.  Future labs ordered  - Comprehensive metabolic panel; Future  - Lipid panel reflex to direct LDL Fasting; Future  - rosuvastatin (CRESTOR) 10 MG tablet; Take 1 tablet (10 mg) by mouth daily.  Dispense: 90 tablet; Refill: 3    3. Hypothyroidism, unspecified type  Previous thyroid function normal.  Weight up 8 pounds.  Clinically euthyroid.  Continue current levothyroxine dose pending lab results  - TSH WITH FREE T4 REFLEX; Future  - levothyroxine (SYNTHROID/LEVOTHROID) 100 MCG tablet; TAKE 1 TABLET(100 MCG) BY MOUTH DAILY  Dispense: 90 tablet; Refill: 3    4. Pulmonary nodules  History of lung nodules and smoking.  Counseled regarding smoking cessation the patient declines at this time.  Follow-up CT chest as ordered  - CT Chest w/o Contrast; Future    5. Pancreatic cyst  History of pancreatic cyst.  Due for follow-up MRCP.  Denies abdominal pain  - MR Abdomen MRCP w/o & w Contrast; Future    6. Uveitis  Chronic, stable.  Continue management per rheumatology.  Labs ordered  - CBC with platelets; Future    7. Tobacco use disorder  Counseled regarding smoking cessation.  Patient not interested in quitting at this time.  Discussed option of Chantix versus nicotine mentation versus Zyban.  Patient will inform physician if willing to quit in the future.  CT chest ordered as above  - CBC with platelets; Future    8. Chronic obstructive pulmonary disease, unspecified COPD type (H)  History COPD secondary to tobacco use.  Rare cough.   Denies current shortness of breath.  Continue inhaler therapy  - albuterol (PROAIR HFA/PROVENTIL HFA/VENTOLIN HFA) 108 (90 Base) MCG/ACT inhaler; Inhale 2 puffs into the lungs every 6 hours as needed for shortness of breath, wheezing or cough.  Dispense: 18 g; Refill: 11  - umeclidinium-vilanterol (ANORO ELLIPTA) 62.5-25 MCG/ACT oral inhaler; Inhale 1 puff into the lungs daily.  Dispense: 180 each; Refill: 3    9. Bilateral hearing loss, unspecified hearing loss type  Patient noticing hearing loss bilaterally.  Now willing to consider hearing aids.  Referral placed  - Adult Audiology  Referral; Future    10. Osteoporosis without current pathological fracture, unspecified osteoporosis type  Osteoporosis on DEXA 1 year ago.  Needs treatment but has to have dental work completed first including pulling of teeth.  Patient planning to do this in July.  Once completed and dentist has cleared her of any plans for future dental procedures, then patient to contact clinic and  will start alendronate.  Discussed ONJ risks and other pros/cons of treatment    11. Need for COVID-19 vaccine  - COVID-19 12+ (PFIZER)        PLAN:  Continue current medications  Prescriptions refilled on file at your pharmacy to be filled in the future when needed  CT Chest re: lung nodule history and lung cancer screening with smoking  MRCP of the abdomen to follow-up on pancreatic cyst  [facility] will call you to schedule these two imaging studies. If you have not heard from their schedulers within 2 business days, then call [phone] (radiology scheduling)  Vaccination given today:  Pfizer covid booster vaccine  I would recommend an updated covid vaccination and an influenza/flu vaccination in the Fall (mid/late October)  at the clinic or any pharmacy  Repeat mammogram 3/29/2026 at the [facility]  Repeat colonoscopy in 2029  Patient declined Shingrix vaccination  Dental appointment in July as scheduled for tooth removal.   Once teeth have been removed and jaw has healed well, then contact clinic to start alendronate medication for bone density loss and fracture reduction  Referral to [facility] for hearing loss.  Central schedulers will contact you to set up your appointment or you may call [phone].   Call  [phone] or use [employer] to schedule a future lab appointment  fasting in 1-2 weeks.   For fasting labs, please refrain from eating for 8 hours or more.   Drink 2 glasses of water before your lab appointment. It is fine to take your  oral medications on the morning of the lab test as usual   Healthcare  Directive discussed and given copy.   Patient to complete and return to clinic  Follow-up with rheumatology regarding methotrexate management  Pt was informed regarding extra E&M billing for management of new or established medical issues not related to today's wellness/screening visit           Gayatri Norris is a 69 year old, presenting for the following:  Wellness Visit  And follow-up regarding medical issues as above    HPI     Advance Care Planning      HCD discussed and given copy to complete and bring to clinic to be scanned        4/30/2025   General Health   How would you rate your overall physical health? Good   Feel stress (tense, anxious, or unable to sleep) Not at all         4/30/2025   Nutrition   Diet: Regular (no restrictions)         4/30/2025   Exercise   Days per week of moderate/strenous exercise 2 days   Average minutes spent exercising at this level 60 min   (!) EXERCISE CONCERN      4/30/2025   Social Factors   Frequency of gathering with friends or relatives Once a week   Worry food won't last until get money to buy more No   Food not last or not have enough money for food? No   Do you have housing? (Housing is defined as stable permanent housing and does not include staying outside in a car, in a tent, in an abandoned building, in an overnight shelter, or couch-surfing.) Yes   Are you  worried about losing your housing? No   Lack of transportation? No   Unable to get utilities (heat,electricity)? No         5/5/2025   Fall Risk   Gait Speed Test (Document in seconds) 3.67   Gait Speed Test Interpretation Less than or equal to 5.00 seconds - PASS          4/30/2025   Activities of Daily Living- Home Safety   Needs help with the following daily activites None of the above   Safety concerns in the home None of the above         4/30/2025   Dental   Dentist two times every year? Yes         4/30/2025   Hearing Screening   Hearing concerns? (!) I NEED TO ASK PEOPLE TO SPEAK UP OR REPEAT THEMSELVES.    (!) IT'S HARD TO FOLLOW A CONVERSATION IN A NOISY RESTAURANT OR CROWDED ROOM.       Multiple values from one day are sorted in reverse-chronological order         4/30/2025   Driving Risk Screening   Patient/family members have concerns about driving No         4/30/2025   General Alertness/Fatigue Screening   Have you been more tired than usual lately? (!) YES         4/30/2025   Urinary Incontinence Screening   Bothered by leaking urine in past 6 months No         Today's PHQ-2 Score:       5/5/2025     9:49 AM   PHQ-2 ( 1999 Pfizer)   Q1: Little interest or pleasure in doing things 0   Q2: Feeling down, depressed or hopeless 0   PHQ-2 Score 0    Q1: Little interest or pleasure in doing things Not at all   Q2: Feeling down, depressed or hopeless Not at all   PHQ-2 Score 0       Patient-reported           4/30/2025   Substance Use   Alcohol more than 3/day or more than 7/wk No   Do you have a current opioid prescription? No   How severe/bad is pain from 1 to 10? 0/10 (No Pain)   Do you use any other substances recreationally? No     Social History     Tobacco Use    Smoking status: Every Day     Current packs/day: 0.00     Average packs/day: 0.8 packs/day for 37.5 years (30.0 ttl pk-yrs)     Types: Cigarettes     Start date: 9/10/1991     Last attempt to quit: 9/10/2021     Years since quitting: 3.6     Smokeless tobacco: Never    Tobacco comments:     Very hard to quite since Covid in 2020   Vaping Use    Vaping status: Never Used   Substance Use Topics    Alcohol use: No    Drug use: No            3/28/2025   LAST FHS-7 RESULTS   1st degree relative breast or ovarian cancer Yes   Any relative bilateral breast cancer No   Any male have breast cancer No   Any ONE woman have BOTH breast AND ovarian cancer Yes   Any woman with breast cancer before 50yrs No   2 or more relatives with breast AND/OR ovarian cancer No   2 or more relatives with breast AND/OR bowel cancer Yes        Mammogram Screening - Mammogram every 1-2 years updated in Health Maintenance based on mutual decision making      History of abnormal Pap smear: No - age 65 or older with adequate negative prior screening test results (3 consecutive negative cytology results, 2 consecutive negative cotesting results, or 2 consecutive negative HrHPV test results within 10 years, with the most recent test occurring within the recommended screening interval for the test used)        10/23/2008    12:00 AM 4/2/2007    12:00 AM 3/29/2006    12:00 AM   PAP / HPV   PAP (Historical) NIL  NIL  NIL      ASCVD Risk   The 10-year ASCVD risk score (Terrell GRAJEDA, et al., 2019) is: 14.3%    Values used to calculate the score:      Age: 69 years      Sex: Female      Is Non- : No      Diabetic: No      Tobacco smoker: Yes      Systolic Blood Pressure: 143 mmHg      Is BP treated: No      HDL Cholesterol: 44 mg/dL      Total Cholesterol: 130 mg/dL     Pt's past medical history, family history, habits, medications and allergies were reviewed with the patient today.   Most recent lab results reviewed with pt. Problem list and histories reviewed & adjusted, as indicated.    Screening questionnaire responses above  regarding general health status, nutrition, exercise, social engagement, fall risk, ADL home safety, dental concerns, hearing concerns,  driving concerns, alertness, incontinence concerns, mental health, substance concerns were reviewed with the patient today. Cognitive status  was also reviewed as below. Weight/BMI status reviewed.  Preventative Healthcare counseling provided to pt as applicable based on positive pt responses/concerns/results of above screening questionnaire.  See pt instructions for additional recommendations.  Otherwise reinforced continuation of good health care maintenance as above.  Also reviewed immunization guidelines,   eye screening for vision/glaucoma at last every other year, routine cancer screenings including annual skin cancer check with myself or dermatology, colon cancer screening for everyone until age 75 and then individualized to age 80,   mammogram screening in women to age 75 and then individualized to age 80, PAP/pelvic for women until age 65.  DEXA/bone density status: Up-to-date 2024  See plan discussion above regarding healthcare maintenance recommendations       Current providers sharing in care for this patient include:  Patient Care Team:  Leonardo Angela MD as PCP - General (Internal Medicine)  Leonardo Angela MD as Assigned PCP  Shikha Riley MD as MD (Pulmonary Disease)    The following health maintenance items are reviewed in Epic and correct as of today:  Health Maintenance   Topic Date Due    COPD ACTION PLAN  Never done    ANNUAL REVIEW OF HM ORDERS  06/09/2022    COVID-19 Vaccine (6 - Pfizer risk 2024-25 season) 04/08/2025    LIPID  05/03/2025    MEDICARE ANNUAL WELLNESS VISIT  05/03/2025    TSH W/FREE T4 REFLEX  05/03/2025    LUNG CANCER SCREENING  05/29/2025    NICOTINE/TOBACCO CESSATION COUNSELING Q 1 YR  08/28/2025    MAMMO SCREENING  03/28/2026    FALL RISK ASSESSMENT  05/05/2026    DIABETES SCREENING  05/03/2027    DTAP/TDAP/TD IMMUNIZATION (2 - Td or Tdap) 03/08/2028    ADVANCE CARE PLANNING  05/03/2029    COLORECTAL CANCER SCREENING  06/10/2029    DEXA  05/08/2039    SPIROMETRY   "Completed    HEPATITIS C SCREENING  Completed    PHQ-2 (once per calendar year)  Completed    INFLUENZA VACCINE  Completed    Pneumococcal Vaccine: 50+ Years  Completed    RSV VACCINE  Completed    HPV IMMUNIZATION  Aged Out    MENINGITIS IMMUNIZATION  Aged Out    PAP  Discontinued    ZOSTER IMMUNIZATION  Discontinued         Review of Systems  CONSTITUTIONAL: NEGATIVE for fever, chills.  Weight up 8 pounds  INTEGUMENTARY/SKIN: NEGATIVE for worrisome rashes, moles or lesions currently.   On methotrexate on Mondays.  Gets a mild rash around her neck scalp and upper back.  Few days after the dose and then resolves.  Denies rash currently  EYES:  POSITIVE for hx chronic uveitis.  Dermatology and ophthalmology.  Stable vision currently  ENT/MOUTH: NEGATIVE for ear pain and throat problems.  Has some loose teeth that need to be pulled and plans on doing it in July with the dentist.  Patient declines using future dental implants.  Has reduced hearing and would like to consider hearing aids  RESP: NEGATIVE for significant  SOB.  Smoking 5 cigarettes a day.  History of lung nodules and due for follow-up CT chest.  Rare dry cough  BREAST: NEGATIVE for masses, tenderness or discharge  CV: NEGATIVE for chest pain, palpitations or peripheral edema  GI: NEGATIVE for nausea, abdominal pain, heartburn (off of PPI), or change in bowel habits.  History of pancreatic cyst and due for follow-up MRCP abdomen  : NEGATIVE for frequency, dysuria, or hematuria  MUSCULOSKELETAL:  POSITIVE for stable arthralgias. On DMARDS with Rheumatology  NEURO: NEGATIVE for weakness, dizziness or paresthesias  ENDOCRINE: NEGATIVE for temperature intolerance.  History osteoporosis on DEXA 2024.  Not under treatment at this time  HEME: NEGATIVE for bleeding problems  PSYCHIATRIC: NEGATIVE for changes in mood or affect     Objective    Exam  BP (!) 143/79   Pulse 72   Temp 97.7  F (36.5  C) (Temporal)   Resp 16   Ht 1.702 m (5' 7\")   Wt 69 kg (152 lb " "3.2 oz)   LMP  (LMP Unknown)   SpO2 98%   BMI 23.84 kg/m     Estimated body mass index is 23.84 kg/m  as calculated from the following:    Height as of this encounter: 1.702 m (5' 7\").    Weight as of this encounter: 69 kg (152 lb 3.2 oz).    Physical Exam  General appearance -  alert, no distress  Skin - No rashes or lesions.  Head - normocephalic, atraumatic  Eyes - JAVI, EOMI, fundi exam with nondilated pupils negative.  Ears - External ears normal. Canals clear. TM's normal.  Nose/Sinuses - Nares normal. Septum midline. Mucosa normal. No drainage or sinus tenderness.  Oropharynx - No erythema, no adenopathy, no exudates.  No gumline erythema or drainage.  Teeth in moderate repair.  A few mildly loose  Neck - Supple without adenopathy or thyromegaly. No bruits.  Lungs - Clear to auscultation without wheezes/rhonchi.  Slight prolonged expiratory phase.  Speaking easily.  No accessory muscle use  Heart - Regular rate and rhythm without murmurs, clicks, or gallops.  Nodes - No supraclavicular, axillary, or inguinal adenopathy palpable.  Breasts - deferred  Abdomen - Abdomen soft, non-tender. BS normal. No masses or hepatosplenomegaly palpable. No bruits.  Extremities -No cyanosis, clubbing or edema.   Mild varicose veins at ankles bilaterally nontender  Musculoskeletal - Spine ROM normal. Muscular strength intact.   Peripheral pulses - radial=4/4, femoral=4/4, posterior tibial=4/4, dorsalis pedis=4/4,  Neuro - Gait normal. Reflexes normal and symmetric. Sensation grossly WNL.  Genital/Rectal - deferred           5/5/2025   Mini Cog   Clock Draw Score 2 Normal   3 Item Recall 3 objects recalled   Mini Cog Total Score 5          Signed Electronically by: Leonardo Angela MD     "

## 2025-05-06 ENCOUNTER — TELEPHONE (OUTPATIENT)
Dept: DERMATOLOGY | Facility: CLINIC | Age: 70
End: 2025-05-06
Payer: COMMERCIAL

## 2025-05-06 NOTE — TELEPHONE ENCOUNTER
[facility] Call Center    Phone Message    May a detailed message be left on voicemail: no     Reason for Call: Other: Pt Myrna is dealing with Hives on Face & Neck- She is afraid to go out, people think she has measles.  Call [phone] for sooner if before 10/29. Added to the [facility] too.       Action Taken: Other: OX DERM<    Travel Screening: Not Applicable     Date of Service:

## 2025-05-06 NOTE — TELEPHONE ENCOUNTER
Called and spoke with pt and scheduled sooner appt    Cassy ISRAEL RN  Dermatology   [phone]

## 2025-05-07 ENCOUNTER — PATIENT OUTREACH (OUTPATIENT)
Dept: CARE COORDINATION | Facility: CLINIC | Age: 70
End: 2025-05-07
Payer: COMMERCIAL

## 2025-05-07 PROBLEM — M81.0 OSTEOPOROSIS WITHOUT CURRENT PATHOLOGICAL FRACTURE, UNSPECIFIED OSTEOPOROSIS TYPE: Status: ACTIVE | Noted: 2025-05-07

## 2025-05-07 RX ORDER — ROSUVASTATIN CALCIUM 10 MG/1
10 TABLET, COATED ORAL DAILY
Qty: 90 TABLET | Refills: 3 | Status: SHIPPED | OUTPATIENT
Start: 2025-05-07

## 2025-05-07 RX ORDER — LEVOTHYROXINE SODIUM 100 UG/1
TABLET ORAL
Qty: 90 TABLET | Refills: 3 | Status: SHIPPED | OUTPATIENT
Start: 2025-05-07

## 2025-05-07 RX ORDER — ALBUTEROL SULFATE 90 UG/1
2 INHALANT RESPIRATORY (INHALATION) EVERY 6 HOURS PRN
Qty: 18 G | Refills: 11 | Status: SHIPPED | OUTPATIENT
Start: 2025-05-07

## 2025-05-07 RX ORDER — UMECLIDINIUM BROMIDE AND VILANTEROL TRIFENATATE 62.5; 25 UG/1; UG/1
1 POWDER RESPIRATORY (INHALATION) DAILY
Qty: 180 EACH | Refills: 3 | Status: SHIPPED | OUTPATIENT
Start: 2025-05-07

## 2025-05-07 NOTE — PATIENT INSTRUCTIONS
Continue current medications  Prescriptions refilled on file at your pharmacy to be filled in the future when needed  CT Chest re: lung nodule history and lung cancer screening with smoking  MRCP of the abdomen to follow-up on pancreatic cyst  [facility] will call you to schedule these two imaging studies. If you have not heard from their schedulers within 2 business days, then call [phone] (radiology scheduling)  Vaccination given today:  Pfizer covid booster vaccine  I would recommend an updated covid vaccination and an influenza/flu vaccination in the Fall (mid/late October)  at the clinic or any pharmacy  Repeat mammogram 3/29/2026 at the [facility]  Repeat colonoscopy in 2029  Patient declined Shingrix vaccination  Dental appointment in July as scheduled for tooth removal.  Once teeth have been removed and jaw has healed well, then contact clinic to start alendronate medication for bone density loss and fracture reduction  Referral to [facility] for hearing loss.  Central schedulers will contact you to set up your appointment or you may call [phone].   Call  [phone] or use [employer] to schedule a future lab appointment  fasting in 1-2 weeks.   For fasting labs, please refrain from eating for 8 hours or more.   Drink 2 glasses of water before your lab appointment. It is fine to take your  oral medications on the morning of the lab test as usual   Healthcare  Directive discussed and given copy.   Patient to complete and return to clinic  Follow-up with rheumatology regarding methotrexate management  Pt was informed regarding extra E&M billing for management of new or established medical issues not related to today's wellness/screening visit

## 2025-05-14 NOTE — PLAN OF CARE
End of Shift Summary  For vital signs and complete assessments, please see documentation flowsheets.     Pertinent assessments: Lung sounds diminished but clear. Per pt report, sputum minimally pink-tinged. Infrequent productive cough. Denies shortness of breath. Remains on RA. Good appetite, denies nausea. Up independently in room.   Major Shift Events: None  Treatment Plan: Pulmonary hygiene, scheduled albuterol inhaler, supportive cares. Plan for pulmonology to see on Monday.    yes

## 2025-05-17 ENCOUNTER — HOSPITAL ENCOUNTER (OUTPATIENT)
Dept: MRI IMAGING | Facility: CLINIC | Age: 70
Discharge: HOME | End: 2025-05-17
Attending: INTERNAL MEDICINE | Admitting: INTERNAL MEDICINE
Payer: COMMERCIAL

## 2025-05-17 DIAGNOSIS — K86.2 PANCREATIC CYST: ICD-10-CM

## 2025-05-17 PROCEDURE — 255N000002 HC RX 255 OP 636: Performed by: INTERNAL MEDICINE

## 2025-05-17 PROCEDURE — 74183 MRI ABD W/O CNTR FLWD CNTR: CPT

## 2025-05-17 PROCEDURE — A9585 GADOBUTROL INJECTION: HCPCS | Performed by: INTERNAL MEDICINE

## 2025-05-17 RX ORDER — GADOBUTROL 604.72 MG/ML
7 INJECTION INTRAVENOUS ONCE
Status: COMPLETED | OUTPATIENT
Start: 2025-05-17 | End: 2025-05-17

## 2025-05-17 RX ADMIN — GADOBUTROL 7 ML: 604.72 INJECTION INTRAVENOUS at 09:52

## 2025-05-19 ENCOUNTER — OFFICE VISIT (OUTPATIENT)
Dept: DERMATOLOGY | Facility: CLINIC | Age: 70
End: 2025-05-19
Payer: COMMERCIAL

## 2025-05-19 DIAGNOSIS — L29.9 PRURITUS: ICD-10-CM

## 2025-05-19 DIAGNOSIS — L71.9 ROSACEA: Primary | ICD-10-CM

## 2025-05-19 PROCEDURE — 99204 OFFICE O/P NEW MOD 45 MIN: CPT | Performed by: STUDENT IN AN ORGANIZED HEALTH CARE EDUCATION/TRAINING PROGRAM

## 2025-05-19 RX ORDER — DOXYCYCLINE 100 MG/1
100 CAPSULE ORAL 2 TIMES DAILY
Qty: 60 CAPSULE | Refills: 4 | Status: SHIPPED | OUTPATIENT
Start: 2025-05-19

## 2025-05-19 RX ORDER — METRONIDAZOLE TOPICAL 7.5 MG/G
GEL TOPICAL 2 TIMES DAILY
Qty: 45 G | Refills: 5 | Status: SHIPPED | OUTPATIENT
Start: 2025-05-19

## 2025-05-19 NOTE — PATIENT INSTRUCTIONS
Take doxycycline 100mg twice daily  - avoid sun, or wear sunscreen and sun protective clothing when outside, this medicine will make you more likely to get a sunburn while taking  - take with food to avoid nausea  - take with a glass of water and remain upright for 30 minutes after to avoid esophageal irritation      Apply metrogel  twice daily     Use at least spf 30

## 2025-05-19 NOTE — LETTER
5/19/2025      Irene Haile  [address]  [place] [address]      Dear Colleague,    Thank you for referring your patient, Irene Haile, to the [facility]. Please see a copy of my visit note below.    [facility] Dermatology Note    Encounter Date: May 19, 2025    Dermatology Problem List:      Major PMHx  #Uveitis; methotrexate   ______________________________________    Impression/Plan:  Myrna was seen today for derm problem.    Diagnoses and all orders for this visit:    Pruritus  Rosacea  -     metroNIDAZOLE (METROGEL) 0.75 % external gel; Apply topically 2 times daily.  -     doxycycline monohydrate (MONODOX) 100 MG capsule; Take 1 capsule (100 mg) by mouth 2 times daily.  -Background ET rosacea  - Superimposed pinpoint pustules as is typical of papulopustular rosacea  - Will treat as such with MetroGel twice daily and doxycycline twice daily, advised her to take it with food  - Pityrosporum folliculitis is also a possibility although I favor rosacea  - We will assess her response to treatment and proceed accordingly        Follow-up in 2-3 mo.       Staff Involved:  Staff Only    Rodney Siegel MD   of Dermatology  Department of Dermatology  [facility]      CC:   Chief Complaint   Patient presents with     Derm Problem     Rash that started a few months ago,  from the neck up        History of Present Illness:  Ms. Irene Haile is a 69 year old female who presents as a new patient.    Rash on neck that started a few months ago     Labs:      Physical exam:  Vitals: LMP  (LMP Unknown)   GEN: well developed, well-nourished, in no acute distress, in a pleasant mood.     SKIN: Gonzalez phototype 1  - Acne exam, which includes the face, neck, upper central chest, and upper central back was performed.  - pink papules and pustules on face and neck  - No other lesions of concern on areas  examined.     Past Medical History:   Past Medical History:   Diagnosis Date     Axillary adenopathy 2013     COPD (chronic obstructive pulmonary disease) (H) 2013     Hypothyroidism, unspecified type 2017     Personal history of tobacco use, presenting hazards to health      Pulmonary hemorrhage 2013     UTI (urinary tract infection)      Past Surgical History:   Procedure Laterality Date     APPENDECTOMY  2010     BIOPSY  2013    Breast Biopsy Ultrasound-guided biopsy no malignancy     COLONOSCOPY  2021     EYE SURGERY  10/25/2017    right eye - Cataract Lens Replacement     ZC NONSPECIFIC PROCEDURE  2010    Laparoscopic appendectomy     ZC TOTAL ABDOM HYSTERECTOMY  2002     BSO       Social History:   reports that she has been smoking cigarettes. She started smoking about 33 years ago. She has a 30 pack-year smoking history. She has been exposed to tobacco smoke. She has never used smokeless tobacco. She reports that she does not drink alcohol and does not use drugs.    Family History:  Family History   Problem Relation Age of Onset     Breast Cancer Mother      Cancer Mother         All over her body     Alcohol/Drug Father         Has Liver Problems     Family History Negative Sister      Diabetes Brother         Diag. age 41     Cancer - colorectal Brother         before age 50     Cancer Brother         liver cancer      Family History Negative Brother         ulcerative colitis     Colon Cancer Brother         Was caught through another surgery and removed successful     Sjogren's Daughter      Skin Cancer No family hx of        Medications:  Current Outpatient Medications   Medication Sig Dispense Refill     doxycycline monohydrate (MONODOX) 100 MG capsule Take 1 capsule (100 mg) by mouth 2 times daily. 60 capsule 4     metroNIDAZOLE (METROGEL) 0.75 % external gel Apply topically 2 times daily. 45 g 5     albuterol (PROAIR HFA/PROVENTIL  HFA/VENTOLIN HFA) 108 (90 Base) MCG/ACT inhaler Inhale 2 puffs into the lungs every 6 hours as needed for shortness of breath, wheezing or cough. 18 g 11     dorzolamide-timolol (COSOPT) 2-0.5 % ophthalmic solution Place 1 drop into both eyes 2 times daily.       folic acid (FOLVITE) 1 MG tablet Take 1 tablet by mouth daily.       levothyroxine (SYNTHROID/LEVOTHROID) 100 MCG tablet TAKE 1 TABLET(100 MCG) BY MOUTH DAILY 90 tablet 3     methotrexate 2.5 MG tablet 15 mg Usually on Mondays.       omeprazole (PRILOSEC) 20 MG DR capsule Take 1 capsule (20 mg) by mouth daily as needed.       rosuvastatin (CRESTOR) 10 MG tablet Take 1 tablet (10 mg) by mouth daily. 90 tablet 3     umeclidinium-vilanterol (ANORO ELLIPTA) 62.5-25 MCG/ACT oral inhaler Inhale 1 puff into the lungs daily. 180 each 3     Allergies   Allergen Reactions     Amoxicillin-Pot Clavulanate       Vomiting/nausea     Atorvastatin      Muscle achiness       Augmented Betamethasone Diprop [Betamethasone] GI Disturbance     Codeine      Insomnia       Nitrofurantoin      Nausea, loss appetite     Shingrix [Zoster Vac Recomb Adjuvanted]      Severe body pain for 3-4 weeks             Again, thank you for allowing me to participate in the care of your patient.        Sincerely,        Rodney Siegel MD    Electronically signed

## 2025-05-19 NOTE — PROGRESS NOTES
[facility] Dermatology Note    Encounter Date: May 19, 2025    Dermatology Problem List:      Major PMHx  #Uveitis; methotrexate   ______________________________________    Impression/Plan:  Myrna was seen today for derm problem.    Diagnoses and all orders for this visit:    Pruritus  Rosacea  -     metroNIDAZOLE (METROGEL) 0.75 % external gel; Apply topically 2 times daily.  -     doxycycline monohydrate (MONODOX) 100 MG capsule; Take 1 capsule (100 mg) by mouth 2 times daily.  -Background ET rosacea  - Superimposed pinpoint pustules as is typical of papulopustular rosacea  - Will treat as such with MetroGel twice daily and doxycycline twice daily, advised her to take it with food  - Pityrosporum folliculitis is also a possibility although I favor rosacea  - We will assess her response to treatment and proceed accordingly        Follow-up in 2-3 mo.       Staff Involved:  Staff Only    Rodney Siegel MD   of Dermatology  Department of Dermatology  [facility]      CC:   Chief Complaint   Patient presents with    Derm Problem     Rash that started a few months ago,  from the neck up        History of Present Illness:  Ms. Irene Haile is a 69 year old female who presents as a new patient.    Rash on neck that started a few months ago     Labs:      Physical exam:  Vitals: LMP  (LMP Unknown)   GEN: well developed, well-nourished, in no acute distress, in a pleasant mood.     SKIN: Gonzalez phototype 1  - Acne exam, which includes the face, neck, upper central chest, and upper central back was performed.  - pink papules and pustules on face and neck  - No other lesions of concern on areas examined.     Past Medical History:   Past Medical History:   Diagnosis Date    Axillary adenopathy 06/05/2013    COPD (chronic obstructive pulmonary disease) (H) 06/05/2013    Hypothyroidism, unspecified type 12/30/2017    Personal history of tobacco  use, presenting hazards to health     Pulmonary hemorrhage 2013    UTI (urinary tract infection)      Past Surgical History:   Procedure Laterality Date    APPENDECTOMY  2010    BIOPSY  2013    Breast Biopsy Ultrasound-guided biopsy no malignancy    COLONOSCOPY  2021    EYE SURGERY  10/25/2017    right eye - Cataract Lens Replacement    ZZC NONSPECIFIC PROCEDURE  2010    Laparoscopic appendectomy    ZC TOTAL ABDOM HYSTERECTOMY  2002     BSO       Social History:   reports that she has been smoking cigarettes. She started smoking about 33 years ago. She has a 30 pack-year smoking history. She has been exposed to tobacco smoke. She has never used smokeless tobacco. She reports that she does not drink alcohol and does not use drugs.    Family History:  Family History   Problem Relation Age of Onset    Breast Cancer Mother     Cancer Mother         All over her body    Alcohol/Drug Father         Has Liver Problems    Family History Negative Sister     Diabetes Brother         Diag. age 41    Cancer - colorectal Brother         before age 50    Cancer Brother         liver cancer     Family History Negative Brother         ulcerative colitis    Colon Cancer Brother         Was caught through another surgery and removed successful    Sjogren's Daughter     Skin Cancer No family hx of        Medications:  Current Outpatient Medications   Medication Sig Dispense Refill    doxycycline monohydrate (MONODOX) 100 MG capsule Take 1 capsule (100 mg) by mouth 2 times daily. 60 capsule 4    metroNIDAZOLE (METROGEL) 0.75 % external gel Apply topically 2 times daily. 45 g 5    albuterol (PROAIR HFA/PROVENTIL HFA/VENTOLIN HFA) 108 (90 Base) MCG/ACT inhaler Inhale 2 puffs into the lungs every 6 hours as needed for shortness of breath, wheezing or cough. 18 g 11    dorzolamide-timolol (COSOPT) 2-0.5 % ophthalmic solution Place 1 drop into both eyes 2 times daily.      folic acid (FOLVITE) 1 MG  tablet Take 1 tablet by mouth daily.      levothyroxine (SYNTHROID/LEVOTHROID) 100 MCG tablet TAKE 1 TABLET(100 MCG) BY MOUTH DAILY 90 tablet 3    methotrexate 2.5 MG tablet 15 mg Usually on Mondays.      omeprazole (PRILOSEC) 20 MG DR capsule Take 1 capsule (20 mg) by mouth daily as needed.      rosuvastatin (CRESTOR) 10 MG tablet Take 1 tablet (10 mg) by mouth daily. 90 tablet 3    umeclidinium-vilanterol (ANORO ELLIPTA) 62.5-25 MCG/ACT oral inhaler Inhale 1 puff into the lungs daily. 180 each 3     Allergies   Allergen Reactions    Amoxicillin-Pot Clavulanate       Vomiting/nausea    Atorvastatin      Muscle achiness      Augmented Betamethasone Diprop [Betamethasone] GI Disturbance    Codeine      Insomnia      Nitrofurantoin      Nausea, loss appetite    Shingrix [Zoster Vac Recomb Adjuvanted]      Severe body pain for 3-4 weeks

## 2025-05-25 ENCOUNTER — HOSPITAL ENCOUNTER (OUTPATIENT)
Dept: CT IMAGING | Facility: CLINIC | Age: 70
Discharge: HOME | End: 2025-05-25
Attending: INTERNAL MEDICINE | Admitting: INTERNAL MEDICINE
Payer: COMMERCIAL

## 2025-05-25 DIAGNOSIS — R91.8 PULMONARY NODULES: ICD-10-CM

## 2025-05-25 PROCEDURE — 71250 CT THORAX DX C-: CPT

## 2025-05-27 PROBLEM — H91.93 BILATERAL HEARING LOSS, UNSPECIFIED HEARING LOSS TYPE: Status: ACTIVE | Noted: 2025-05-27

## 2025-06-04 ENCOUNTER — TRANSFERRED RECORDS (OUTPATIENT)
Dept: HEALTH INFORMATION MANAGEMENT | Facility: CLINIC | Age: 70
End: 2025-06-04
Payer: COMMERCIAL

## 2025-06-11 ENCOUNTER — LAB (OUTPATIENT)
Dept: LAB | Facility: CLINIC | Age: 70
End: 2025-06-11
Payer: COMMERCIAL

## 2025-06-11 DIAGNOSIS — E78.5 HYPERLIPIDEMIA LDL GOAL <100: ICD-10-CM

## 2025-06-11 DIAGNOSIS — E03.9 HYPOTHYROIDISM, UNSPECIFIED TYPE: ICD-10-CM

## 2025-06-11 DIAGNOSIS — F17.200 TOBACCO USE DISORDER: ICD-10-CM

## 2025-06-11 DIAGNOSIS — M15.0 PRIMARY GENERALIZED (OSTEO)ARTHRITIS: ICD-10-CM

## 2025-06-11 DIAGNOSIS — H20.9 UVEITIS: ICD-10-CM

## 2025-06-11 DIAGNOSIS — Z79.899 ENCOUNTER FOR LONG-TERM (CURRENT) USE OF MEDICATIONS: ICD-10-CM

## 2025-06-11 LAB
ALBUMIN SERPL BCG-MCNC: 4.1 G/DL (ref 3.5–5.2)
ALP SERPL-CCNC: 139 U/L (ref 40–150)
ALT SERPL W P-5'-P-CCNC: 56 U/L (ref 0–50)
ANION GAP SERPL CALCULATED.3IONS-SCNC: 8 MMOL/L (ref 7–15)
AST SERPL W P-5'-P-CCNC: 50 U/L (ref 0–45)
BASOPHILS # BLD AUTO: 0 10E3/UL (ref 0–0.2)
BASOPHILS NFR BLD AUTO: 1 %
BILIRUB SERPL-MCNC: 0.6 MG/DL
BUN SERPL-MCNC: 13.7 MG/DL (ref 8–23)
CALCIUM SERPL-MCNC: 9.6 MG/DL (ref 8.8–10.4)
CHLORIDE SERPL-SCNC: 104 MMOL/L (ref 98–107)
CREAT SERPL-MCNC: 0.68 MG/DL (ref 0.51–0.95)
EGFRCR SERPLBLD CKD-EPI 2021: >90 ML/MIN/1.73M2
EOSINOPHIL # BLD AUTO: 0.4 10E3/UL (ref 0–0.7)
EOSINOPHIL NFR BLD AUTO: 6 %
ERYTHROCYTE [DISTWIDTH] IN BLOOD BY AUTOMATED COUNT: 13.7 % (ref 10–15)
GLUCOSE SERPL-MCNC: 81 MG/DL (ref 70–99)
HCO3 SERPL-SCNC: 26 MMOL/L (ref 22–29)
HCT VFR BLD AUTO: 42.8 % (ref 35–47)
HGB BLD-MCNC: 14.1 G/DL (ref 11.7–15.7)
IMM GRANULOCYTES # BLD: 0 10E3/UL
IMM GRANULOCYTES NFR BLD: 0 %
LYMPHOCYTES # BLD AUTO: 2.1 10E3/UL (ref 0.8–5.3)
LYMPHOCYTES NFR BLD AUTO: 32 %
MCH RBC QN AUTO: 30.2 PG (ref 26.5–33)
MCHC RBC AUTO-ENTMCNC: 32.9 G/DL (ref 31.5–36.5)
MCV RBC AUTO: 92 FL (ref 78–100)
MONOCYTES # BLD AUTO: 0.7 10E3/UL (ref 0–1.3)
MONOCYTES NFR BLD AUTO: 11 %
NEUTROPHILS # BLD AUTO: 3.3 10E3/UL (ref 1.6–8.3)
NEUTROPHILS NFR BLD AUTO: 50 %
PLATELET # BLD AUTO: 225 10E3/UL (ref 150–450)
POTASSIUM SERPL-SCNC: 4.2 MMOL/L (ref 3.4–5.3)
PROT SERPL-MCNC: 6.9 G/DL (ref 6.4–8.3)
RBC # BLD AUTO: 4.67 10E6/UL (ref 3.8–5.2)
SODIUM SERPL-SCNC: 138 MMOL/L (ref 135–145)
TSH SERPL DL<=0.005 MIU/L-ACNC: 2.34 UIU/ML (ref 0.3–4.2)
WBC # BLD AUTO: 6.5 10E3/UL (ref 4–11)

## 2025-06-11 PROCEDURE — 85025 COMPLETE CBC W/AUTO DIFF WBC: CPT | Mod: GZ

## 2025-06-11 PROCEDURE — 80053 COMPREHEN METABOLIC PANEL: CPT

## 2025-06-11 PROCEDURE — 84443 ASSAY THYROID STIM HORMONE: CPT

## 2025-06-11 PROCEDURE — 36415 COLL VENOUS BLD VENIPUNCTURE: CPT

## 2025-06-19 ENCOUNTER — LAB (OUTPATIENT)
Dept: LAB | Facility: CLINIC | Age: 70
End: 2025-06-19
Payer: COMMERCIAL

## 2025-06-19 ENCOUNTER — TELEPHONE (OUTPATIENT)
Dept: INTERNAL MEDICINE | Facility: CLINIC | Age: 70
End: 2025-06-19

## 2025-06-19 DIAGNOSIS — Z79.899 ENCOUNTER FOR LONG-TERM (CURRENT) USE OF MEDICATIONS: ICD-10-CM

## 2025-06-19 DIAGNOSIS — M15.0 PRIMARY GENERALIZED (OSTEO)ARTHRITIS: ICD-10-CM

## 2025-06-19 DIAGNOSIS — E78.5 HYPERLIPIDEMIA LDL GOAL <100: ICD-10-CM

## 2025-06-19 LAB
ALBUMIN SERPL BCG-MCNC: 4.3 G/DL (ref 3.5–5.2)
ALT SERPL W P-5'-P-CCNC: 44 U/L (ref 0–50)
AST SERPL W P-5'-P-CCNC: 43 U/L (ref 0–45)
BASOPHILS # BLD AUTO: 0 10E3/UL (ref 0–0.2)
BASOPHILS NFR BLD AUTO: 0 %
CHOLEST SERPL-MCNC: 127 MG/DL
CREAT SERPL-MCNC: 0.68 MG/DL (ref 0.51–0.95)
EGFRCR SERPLBLD CKD-EPI 2021: >90 ML/MIN/1.73M2
EOSINOPHIL # BLD AUTO: 0.3 10E3/UL (ref 0–0.7)
EOSINOPHIL NFR BLD AUTO: 4 %
ERYTHROCYTE [DISTWIDTH] IN BLOOD BY AUTOMATED COUNT: 13.8 % (ref 10–15)
FASTING STATUS PATIENT QL REPORTED: YES
HCT VFR BLD AUTO: 42.1 % (ref 35–47)
HDLC SERPL-MCNC: 45 MG/DL
HGB BLD-MCNC: 14.3 G/DL (ref 11.7–15.7)
IMM GRANULOCYTES # BLD: 0 10E3/UL
IMM GRANULOCYTES NFR BLD: 0 %
LDLC SERPL CALC-MCNC: 66 MG/DL
LYMPHOCYTES # BLD AUTO: 2.4 10E3/UL (ref 0.8–5.3)
LYMPHOCYTES NFR BLD AUTO: 31 %
MCH RBC QN AUTO: 30.7 PG (ref 26.5–33)
MCHC RBC AUTO-ENTMCNC: 34 G/DL (ref 31.5–36.5)
MCV RBC AUTO: 90 FL (ref 78–100)
MONOCYTES # BLD AUTO: 1 10E3/UL (ref 0–1.3)
MONOCYTES NFR BLD AUTO: 13 %
NEUTROPHILS # BLD AUTO: 4.2 10E3/UL (ref 1.6–8.3)
NEUTROPHILS NFR BLD AUTO: 52 %
NONHDLC SERPL-MCNC: 82 MG/DL
PLATELET # BLD AUTO: 230 10E3/UL (ref 150–450)
RBC # BLD AUTO: 4.66 10E6/UL (ref 3.8–5.2)
TRIGL SERPL-MCNC: 79 MG/DL
WBC # BLD AUTO: 8 10E3/UL (ref 4–11)

## 2025-06-19 NOTE — TELEPHONE ENCOUNTER
Pt called the clinic wanting to know if she should take her medications before her fasting lab appt this morning.   Advised pt that yes, she should take them with a sip of water. Advised her she can also drink black coffee without cream or sugar.  She verbalized understanding.     Thank you,  Naida Sy RN

## 2025-07-28 ENCOUNTER — OFFICE VISIT (OUTPATIENT)
Dept: DERMATOLOGY | Facility: CLINIC | Age: 70
End: 2025-07-28
Payer: COMMERCIAL

## 2025-07-28 DIAGNOSIS — L57.8 ACTINIC SKIN DAMAGE: Primary | ICD-10-CM

## 2025-07-28 DIAGNOSIS — L71.9 ROSACEA: ICD-10-CM

## 2025-07-28 PROCEDURE — 99214 OFFICE O/P EST MOD 30 MIN: CPT | Performed by: STUDENT IN AN ORGANIZED HEALTH CARE EDUCATION/TRAINING PROGRAM

## 2025-07-28 RX ORDER — METRONIDAZOLE TOPICAL 7.5 MG/G
GEL TOPICAL 2 TIMES DAILY
Qty: 45 G | Refills: 11 | Status: SHIPPED | OUTPATIENT
Start: 2025-07-28

## 2025-07-28 NOTE — PATIENT INSTRUCTIONS
Proper skin care from [facility]:    -Eliminate harsh soaps as they strip the natural oils from the skin, often resulting in dry itchy skin ( i.e. Dial, Zest, [place])  -Use mild soaps such as Cetaphil or Dove Sensitive Skin in the shower. You do not need to use soap on arms, legs, and trunk every time you shower unless visibly soiled.   -Avoid hot or cold showers.  -After showering, lightly (pat) dry off and apply moisturizing within 2-3 minutes. This will help trap moisture in the skin.   -Aggressive use of a moisturizer at least 1-2 times a day to the entire body (including -Vanicream, Cetaphil, Aquaphor or Cerave) and moisturize hands after every washing.  -We recommend using moisturizers that come in a tub that needs to be scooped out, not a pump. This has more of an oil base. It will hold moisture in your skin much better than a water base moisturizer. The above recommended are non-pore clogging.      Wear a sunscreen with at least SPF 30 on your face, ears, neck and V of the chest daily. Wear sunscreen on other areas of the body if those areas are exposed to the sun throughout the day. Sunscreens can contain physical and/or chemical blockers. Physical blockers are less likely to clog pores, these include zinc oxide and titanium dioxide. Reapply every two hour and after swimming.     Sunscreen examples: https://www.ewg.org/sunscreen/    UV radiation  UVA radiation remains constant throughout the day and throughout the year. It is a longer wavelength than UVB and therefore penetrates deeper into the skin leading to immediate and delayed tanning, photoaging, and skin cancer. 70-80% of UVA and UVB radiation occurs between the hours of 10am-2pm.  UVB radiation  UVB radiation causes the most harmful effects and is more significant during the summer months. However, snow and ice can reflect UVB radiation leading to skin damage during the winter months as well. UVB radiation is responsible for  tanning, burning, inflammation, delayed erythema (pinkness), pigmentation (brown spots), and skin cancer.     I recommend self monthly full body exams and yearly full body exams with a dermatology provider. If you develop a new or changing lesion please follow up for examination. Most skin cancers are pink and scaly or pink and pearly. However, we do see blue/brown/black skin cancers.  Consider the ABCDEs of melanoma when giving yourself your monthly full body exam ( don't forget the groin, buttocks, feet, toes, etc). A-asymmetry, B-borders, C-color, D-diameter, E-elevation or evolving. If you see any of these changes please follow up in clinic. If you cannot see your back I recommend purchasing a hand held mirror to use with a larger wall mirror.       Checking for Skin Cancer  You can find cancer early by checking your skin each month. There are 3 kinds of skin cancer. They are melanoma, basal cell carcinoma, and squamous cell carcinoma. Doing monthly skin checks is the best way to find new marks or skin changes. Follow the instructions below for checking your skin.   The ABCDEs of checking moles for melanoma   Check your moles or growths for signs of melanoma using ABCDE:   Asymmetry: the sides of the mole or growth don t match  Border: the edges are ragged, notched, or blurred  Color: the color within the mole or growth varies  Diameter: the mole or growth is larger than 6 mm (size of a pencil eraser)  Evolving: the size, shape, or color of the mole or growth is changing (evolving is not shown in the images below)    Checking for other types of skin cancer  Basal cell carcinoma or squamous cell carcinoma have symptoms such as:     A spot or mole that looks different from all other marks on your skin  Changes in how an area feels, such as itching, tenderness, or pain  Changes in the skin's surface, such as oozing, bleeding, or scaliness  A sore that does not heal  New swelling or redness beyond the border of a  mole    Who s at risk?  Anyone can get skin cancer. But you are at greater risk if you have:   Fair skin, light-colored hair, or light-colored eyes  Many moles or abnormal moles on your skin  A history of sunburns from sunlight or tanning beds  A family history of skin cancer  A history of exposure to radiation or chemicals  A weakened immune system  If you have had skin cancer in the past, you are at risk for recurring skin cancer.   How to check your skin  Do your monthly skin checkups in front of a full-length mirror. Check all parts of your body, including your:   Head (ears, face, neck, and scalp)  Torso (front, back, and sides)  Arms (tops, undersides, upper, and lower armpits)  Hands (palms, backs, and fingers, including under the nails)  Buttocks and genitals  Legs (front, back, and sides)  Feet (tops, soles, toes, including under the nails, and between toes)  If you have a lot of moles, take digital photos of them each month. Make sure to take photos both up close and from a distance. These can help you see if any moles change over time.   Most skin changes are not cancer. But if you see any changes in your skin, call your doctor right away. Only he or she can diagnose a problem. If you have skin cancer, seeing your doctor can be the first step toward getting the treatment that could save your life.   [employer] last reviewed this educational content on 4/1/2019 2000-2020 [employer]. [address] [place] [address], [place] [address]. All rights reserved. This information is not intended as a substitute for professional medical care. Always follow your healthcare professional's instructions.       When should I call my doctor?  If you are worsening or not improving, please, contact us or seek urgent care as noted below.     Who should I call with questions (adults)?    [facility] [phone]  For urgent needs outside of business hours call the [facility] at  [phone] and ask for the dermatology resident on call to be paged  If this is a medical emergency and you are unable to reach an ER, Call [phone]      If you need a prescription refill, please contact your pharmacy. Refills are approved or denied by our Physicians during normal business hours, Monday through Friday.  Per office policy, refills will not be granted if you have not been seen within the past year (or sooner depending on the condition).

## 2025-07-28 NOTE — LETTER
7/28/2025      Irene Haile  [address]  [place] [address]      Dear Colleague,    Thank you for referring your patient, Irene Haile, to the [facility]. Please see a copy of my visit note below.    [facility] Dermatology Note    Encounter Date: Jul 28, 2025    Dermatology Problem List:      Social Hx:  ______________________________________    Impression/Plan:  Myrna was seen today for rosacea.    Diagnoses and all orders for this visit:    Actinic skin damage  - Reviewed the compounding benefits of incremental changes to sun protective behaviors including increased frequency of sunscreen and sun protective clothing like broad brimmed hats and longsleeved UPF containing clothing    Rosacea  -     metroNIDAZOLE (METROGEL) 0.75 % external gel; Apply topically 2 times daily.  - responded excellently  - stopped doxy, doing well on metrogel       Follow-up in 1 year .       Staff Involved:  Staff Only    Rodney Siegel MD   of Dermatology  Department of Dermatology  [facility]      CC:   Chief Complaint   Patient presents with     Rosacea     Pt reports things are going well with current treatment        History of Present Illness:  Ms. Irene Haile is a 69 year old female who presents as a return patient.    Last seen for rosacea. Given metrogel and doxy.     Labs:      Physical exam:  Vitals: LMP  (LMP Unknown)   GEN: well developed, well-nourished, in no acute distress, in a pleasant mood.     SKIN: Gonzalez phototype 1  - Focused examination of the face was performed.  - clear skin  - No other lesions of concern on areas examined.     Past Medical History:   Past Medical History:   Diagnosis Date     Axillary adenopathy 06/05/2013     Bilateral hearing loss, unspecified hearing loss type 05/27/2025     COPD (chronic obstructive pulmonary disease) (H) 06/05/2013     Hyperlipidemia  LDL goal <100 2019     Hypothyroidism, unspecified type 2017     Osteoporosis without current pathological fracture, unspecified osteoporosis type 2025     Pancreatic cyst 2021     Personal history of tobacco use, presenting hazards to health      Positive OCTAVIANO (antinuclear antibody) 2021     Pulmonary hemorrhage 2013     Pulmonary nodules 2024     Tobacco use disorder 2006     UTI (urinary tract infection)      Uveitis 2024     Past Surgical History:   Procedure Laterality Date     APPENDECTOMY  2010     BIOPSY  2013    Breast Biopsy Ultrasound-guided biopsy no malignancy     COLONOSCOPY  2021     EYE SURGERY  10/25/2017    right eye - Cataract Lens Replacement     ZZC NONSPECIFIC PROCEDURE  2010    Laparoscopic appendectomy     ZZC TOTAL ABDOM HYSTERECTOMY  2002     BSO       Social History:   reports that she has been smoking cigarettes. She started smoking about 33 years ago. She has a 30 pack-year smoking history. She has been exposed to tobacco smoke. She has never used smokeless tobacco. She reports that she does not drink alcohol and does not use drugs.    Family History:  Family History   Problem Relation Age of Onset     Breast Cancer Mother      Cancer Mother         All over her body     Alcohol/Drug Father         Has Liver Problems     Family History Negative Sister      Diabetes Brother         Diag. age 41     Cancer - colorectal Brother         before age 50     Cancer Brother         liver cancer      Family History Negative Brother         ulcerative colitis     Colon Cancer Brother         Was caught through another surgery and removed successful     Sjogren's Daughter      Skin Cancer No family hx of        Medications:  Current Outpatient Medications   Medication Sig Dispense Refill     metroNIDAZOLE (METROGEL) 0.75 % external gel Apply topically 2 times daily. 45 g 11     albuterol (PROAIR HFA/PROVENTIL  HFA/VENTOLIN HFA) 108 (90 Base) MCG/ACT inhaler Inhale 2 puffs into the lungs every 6 hours as needed for shortness of breath, wheezing or cough. 18 g 11     dorzolamide-timolol (COSOPT) 2-0.5 % ophthalmic solution Place 1 drop into both eyes 2 times daily.       doxycycline monohydrate (MONODOX) 100 MG capsule Take 1 capsule (100 mg) by mouth 2 times daily. 60 capsule 4     folic acid (FOLVITE) 1 MG tablet Take 1 tablet by mouth daily.       levothyroxine (SYNTHROID/LEVOTHROID) 100 MCG tablet TAKE 1 TABLET(100 MCG) BY MOUTH DAILY 90 tablet 3     methotrexate 2.5 MG tablet 15 mg Usually on Mondays.       omeprazole (PRILOSEC) 20 MG DR capsule Take 1 capsule (20 mg) by mouth daily as needed.       rosuvastatin (CRESTOR) 10 MG tablet Take 1 tablet (10 mg) by mouth daily. 90 tablet 3     umeclidinium-vilanterol (ANORO ELLIPTA) 62.5-25 MCG/ACT oral inhaler Inhale 1 puff into the lungs daily. 180 each 3     Allergies   Allergen Reactions     Amoxicillin-Pot Clavulanate       Vomiting/nausea     Atorvastatin      Muscle achiness       Augmented Betamethasone Diprop [Betamethasone] GI Disturbance     Codeine      Insomnia       Nitrofurantoin      Nausea, loss appetite     Shingrix [Zoster Vac Recomb Adjuvanted]      Severe body pain for 3-4 weeks               Again, thank you for allowing me to participate in the care of your patient.        Sincerely,        Rodney Siegel MD    Electronically signed

## 2025-07-28 NOTE — PROGRESS NOTES
[facility] Dermatology Note    Encounter Date: Jul 28, 2025    Dermatology Problem List:      Social Hx:  ______________________________________    Impression/Plan:  Myrna was seen today for rosacea.    Diagnoses and all orders for this visit:    Actinic skin damage  - Reviewed the compounding benefits of incremental changes to sun protective behaviors including increased frequency of sunscreen and sun protective clothing like broad brimmed hats and longsleeved UPF containing clothing    Rosacea  -     metroNIDAZOLE (METROGEL) 0.75 % external gel; Apply topically 2 times daily.  - responded excellently  - stopped doxy, doing well on metrogel       Follow-up in 1 year .       Staff Involved:  Staff Only    Rodney Siegel MD   of Dermatology  Department of Dermatology  [facility]      CC:   Chief Complaint   Patient presents with    Rosacea     Pt reports things are going well with current treatment        History of Present Illness:  Ms. Irene Haile is a 69 year old female who presents as a return patient.    Last seen for rosacea. Given metrogel and doxy.     Labs:      Physical exam:  Vitals: LMP  (LMP Unknown)   GEN: well developed, well-nourished, in no acute distress, in a pleasant mood.     SKIN: Gonzalez phototype 1  - Focused examination of the face was performed.  - clear skin  - No other lesions of concern on areas examined.     Past Medical History:   Past Medical History:   Diagnosis Date    Axillary adenopathy 06/05/2013    Bilateral hearing loss, unspecified hearing loss type 05/27/2025    COPD (chronic obstructive pulmonary disease) (H) 06/05/2013    Hyperlipidemia LDL goal <100 06/19/2019    Hypothyroidism, unspecified type 12/30/2017    Osteoporosis without current pathological fracture, unspecified osteoporosis type 05/07/2025    Pancreatic cyst 09/27/2021    Personal history of tobacco use, presenting hazards to health      Positive OCTAVIANO (antinuclear antibody) 2021    Pulmonary hemorrhage 2013    Pulmonary nodules 2024    Tobacco use disorder 2006    UTI (urinary tract infection)     Uveitis 2024     Past Surgical History:   Procedure Laterality Date    APPENDECTOMY  2010    BIOPSY  2013    Breast Biopsy Ultrasound-guided biopsy no malignancy    COLONOSCOPY  2021    EYE SURGERY  10/25/2017    right eye - Cataract Lens Replacement    ZZC NONSPECIFIC PROCEDURE  2010    Laparoscopic appendectomy    ZZC TOTAL ABDOM HYSTERECTOMY  2002     BSO       Social History:   reports that she has been smoking cigarettes. She started smoking about 33 years ago. She has a 30 pack-year smoking history. She has been exposed to tobacco smoke. She has never used smokeless tobacco. She reports that she does not drink alcohol and does not use drugs.    Family History:  Family History   Problem Relation Age of Onset    Breast Cancer Mother     Cancer Mother         All over her body    Alcohol/Drug Father         Has Liver Problems    Family History Negative Sister     Diabetes Brother         Diag. age 41    Cancer - colorectal Brother         before age 50    Cancer Brother         liver cancer     Family History Negative Brother         ulcerative colitis    Colon Cancer Brother         Was caught through another surgery and removed successful    Sjogren's Daughter     Skin Cancer No family hx of        Medications:  Current Outpatient Medications   Medication Sig Dispense Refill    metroNIDAZOLE (METROGEL) 0.75 % external gel Apply topically 2 times daily. 45 g 11    albuterol (PROAIR HFA/PROVENTIL HFA/VENTOLIN HFA) 108 (90 Base) MCG/ACT inhaler Inhale 2 puffs into the lungs every 6 hours as needed for shortness of breath, wheezing or cough. 18 g 11    dorzolamide-timolol (COSOPT) 2-0.5 % ophthalmic solution Place 1 drop into both eyes 2 times daily.      doxycycline monohydrate (MONODOX)  100 MG capsule Take 1 capsule (100 mg) by mouth 2 times daily. 60 capsule 4    folic acid (FOLVITE) 1 MG tablet Take 1 tablet by mouth daily.      levothyroxine (SYNTHROID/LEVOTHROID) 100 MCG tablet TAKE 1 TABLET(100 MCG) BY MOUTH DAILY 90 tablet 3    methotrexate 2.5 MG tablet 15 mg Usually on Mondays.      omeprazole (PRILOSEC) 20 MG DR capsule Take 1 capsule (20 mg) by mouth daily as needed.      rosuvastatin (CRESTOR) 10 MG tablet Take 1 tablet (10 mg) by mouth daily. 90 tablet 3    umeclidinium-vilanterol (ANORO ELLIPTA) 62.5-25 MCG/ACT oral inhaler Inhale 1 puff into the lungs daily. 180 each 3     Allergies   Allergen Reactions    Amoxicillin-Pot Clavulanate       Vomiting/nausea    Atorvastatin      Muscle achiness      Augmented Betamethasone Diprop [Betamethasone] GI Disturbance    Codeine      Insomnia      Nitrofurantoin      Nausea, loss appetite    Shingrix [Zoster Vac Recomb Adjuvanted]      Severe body pain for 3-4 weeks